# Patient Record
Sex: MALE | Race: WHITE | NOT HISPANIC OR LATINO | Employment: OTHER | URBAN - METROPOLITAN AREA
[De-identification: names, ages, dates, MRNs, and addresses within clinical notes are randomized per-mention and may not be internally consistent; named-entity substitution may affect disease eponyms.]

---

## 2018-05-28 ENCOUNTER — OFFICE VISIT (OUTPATIENT)
Dept: URGENT CARE | Facility: CLINIC | Age: 38
End: 2018-05-28
Payer: OTHER GOVERNMENT

## 2018-05-28 VITALS
HEART RATE: 72 BPM | TEMPERATURE: 98 F | SYSTOLIC BLOOD PRESSURE: 130 MMHG | OXYGEN SATURATION: 98 % | DIASTOLIC BLOOD PRESSURE: 80 MMHG | WEIGHT: 218.5 LBS

## 2018-05-28 DIAGNOSIS — K64.4 EXTERNAL HEMORRHOID: Primary | ICD-10-CM

## 2018-05-28 PROCEDURE — 99203 OFFICE O/P NEW LOW 30 MIN: CPT | Mod: PBBFAC,PO | Performed by: NURSE PRACTITIONER

## 2018-05-28 PROCEDURE — 99999 PR PBB SHADOW E&M-NEW PATIENT-LVL III: CPT | Mod: PBBFAC,,, | Performed by: NURSE PRACTITIONER

## 2018-05-28 PROCEDURE — 99214 OFFICE O/P EST MOD 30 MIN: CPT | Mod: S$PBB,,, | Performed by: NURSE PRACTITIONER

## 2018-05-28 NOTE — PATIENT INSTRUCTIONS
Hemorrhoids    Hemorrhoids are swollen and inflamed veins inside the rectum and near the anus. The rectum is the last several inches of the colon. The anus is the passage between the rectum and the outside of the body.  Causes  The veins can become swollen due to increased pressure in them. This is most often caused by:  · Chronic constipation or diarrhea  · Straining when having a bowel movement  · Sitting too long on the toilet  · A low-fiber diet  · Pregnancy  Symptoms  · Bleeding from the rectum (this may be noticeable after bowel movements)  · Lump near the anus  · Itching around the anus  · Pain around the anus  There are different types of hemorrhoids. Depending on the type you have and the severity, you may be able to treat yourself at home. In some cases, a procedure may be the best treatment option. Your healthcare provider can tell you more about this, if needed.  Home care  General care  · To get relief from pain or itching, try:  ¨ Topical products. Your healthcare provider may prescribe or recommend creams, ointments, or pads that can be applied to the hemorrhoid. Use these exactly as directed.  ¨ Medicines. Your healthcare provider may recommend stool softeners, suppositories, or laxatives to help manage constipation. Use these exactly as directed.  ¨ Sitz baths. A sitz bath involves sitting in a few inches of warm bath water. Be careful not to make the water so hot that you burn yourself--test it before sitting in it. Soak for about 10 to 15 minutes a few times a day. This may help relieve pain.  Tips to help prevent hemorrhoids  · Eat more fiber. Fiber adds bulk to stool and absorbs water as it moves through your colon. This makes stool softer and easier to pass.  ¨ Increase the fiber in your diet with more fiber-rich foods. These include fresh fruit, vegetables, and whole grains.  ¨ Take a fiber supplement or bulking agent, if advised to by your provider. These include products such as psyllium  or methylcellulose.  · Drink plenty of water, if directed to by your provider. This can help keep stool soft.  · Be more active. Frequent exercise aids digestion and helps prevent constipation. It may also help make bowel movements more regular.  · Dont strain during bowel movements. This can make hemorrhoids more likely. Also, dont sit on the toilet for long periods of time.  Follow-up care  Follow up with your healthcare provider, or as advised. If a culture or imaging tests were done, you will be notified of the results when they are ready. This may take a few days or longer.  When to seek medical advice  Call your healthcare provider right away if any of these occur:  · Increased bleeding from the rectum  · Increased pain around the rectum or anus  · Weakness or dizziness  Call 911  Call 911 or return to the emergency department right away if any of these occur:  · Trouble breathing or swallowing  · Fainting or loss of consciousness  · Unusually fast heart rate  · Vomiting blood  · Large amounts of blood in stool  Date Last Reviewed: 6/22/2015 © 2000-2017 [employer]. [address] [place] [address], [place] [address]. All rights reserved. This information is not intended as a substitute for professional medical care. Always follow your healthcare professional's instructions.

## 2018-05-28 NOTE — PROGRESS NOTES
Subjective:       Patient ID: Blakemore Matthew is a 37 y.o. male.    Chief Complaint: Hemorrhoids    Pt is a 37 year old male to clinic today with complaints of rectal pain and what he believes is a hemorrhoid that began today. Pt states he has been doing heavy weight lifting in gym lately.       Review of Systems   Constitutional: Negative for chills, diaphoresis, fatigue and fever.   HENT: Negative for congestion, sinus pressure and sore throat.    Eyes: Negative for pain.   Respiratory: Negative for cough, chest tightness, shortness of breath and wheezing.    Cardiovascular: Negative for chest pain and palpitations.   Gastrointestinal: Positive for rectal pain (external ). Negative for abdominal pain, anal bleeding, blood in stool, constipation, diarrhea, nausea and vomiting.   Genitourinary: Negative for dysuria.   Musculoskeletal: Negative for back pain, myalgias and neck pain.   Skin: Negative for rash.   Neurological: Negative for dizziness, light-headedness and headaches.       Objective:      Physical Exam   Constitutional: He is oriented to person, place, and time. He appears well-developed and well-nourished. No distress.   HENT:   Head: Normocephalic.   Right Ear: External ear normal.   Left Ear: External ear normal.   Nose: Nose normal.   Eyes: Pupils are equal, round, and reactive to light.   Genitourinary: Rectal exam shows external hemorrhoid.         Neurological: He is alert and oriented to person, place, and time.   Skin: Skin is warm and dry. No rash noted. He is not diaphoretic.   Psychiatric: He has a normal mood and affect. His speech is normal and behavior is normal. Thought content normal.   Nursing note and vitals reviewed.      Assessment:       1. External hemorrhoid        Plan:   External hemorrhoid  -     hydrocortisone-pramoxine (PROCTOFOAM-HS) rectal foam; Place 1 applicator rectally 2 (two) times daily.  Dispense: 10 g; Refill: 0      Recommend pt f/u with GI if symptoms worsen or  fail to improve.    Follow prescribed treatment plan as directed.  Stay hydrated and rest.  Report to ER if symptoms worsen.  Follow up with PCP in 2-3 days or sooner if symptoms do not improve.

## 2018-06-12 ENCOUNTER — OFFICE VISIT (OUTPATIENT)
Dept: INTERNAL MEDICINE | Facility: CLINIC | Age: 38
End: 2018-06-12
Payer: OTHER GOVERNMENT

## 2018-06-12 ENCOUNTER — LAB VISIT (OUTPATIENT)
Dept: LAB | Facility: HOSPITAL | Age: 38
End: 2018-06-12
Attending: INTERNAL MEDICINE
Payer: OTHER GOVERNMENT

## 2018-06-12 VITALS
TEMPERATURE: 97 F | HEIGHT: 71 IN | SYSTOLIC BLOOD PRESSURE: 112 MMHG | WEIGHT: 217.63 LBS | DIASTOLIC BLOOD PRESSURE: 78 MMHG | BODY MASS INDEX: 30.47 KG/M2 | HEART RATE: 84 BPM

## 2018-06-12 DIAGNOSIS — Z00.00 ROUTINE GENERAL MEDICAL EXAMINATION AT A HEALTH CARE FACILITY: ICD-10-CM

## 2018-06-12 DIAGNOSIS — Z30.09 VASECTOMY EVALUATION: Primary | ICD-10-CM

## 2018-06-12 DIAGNOSIS — Z80.8 FAMILY HISTORY OF MELANOMA: ICD-10-CM

## 2018-06-12 LAB
ANION GAP SERPL CALC-SCNC: 8 MMOL/L
BASOPHILS # BLD AUTO: 0.05 K/UL
BASOPHILS NFR BLD: 1 %
BUN SERPL-MCNC: 16 MG/DL
CALCIUM SERPL-MCNC: 9.8 MG/DL
CHLORIDE SERPL-SCNC: 105 MMOL/L
CHOLEST SERPL-MCNC: 154 MG/DL
CHOLEST/HDLC SERPL: 5.5 {RATIO}
CO2 SERPL-SCNC: 24 MMOL/L
CREAT SERPL-MCNC: 0.9 MG/DL
DIFFERENTIAL METHOD: NORMAL
EOSINOPHIL # BLD AUTO: 0.1 K/UL
EOSINOPHIL NFR BLD: 1.6 %
ERYTHROCYTE [DISTWIDTH] IN BLOOD BY AUTOMATED COUNT: 12.7 %
EST. GFR  (AFRICAN AMERICAN): >60 ML/MIN/1.73 M^2
EST. GFR  (NON AFRICAN AMERICAN): >60 ML/MIN/1.73 M^2
GLUCOSE SERPL-MCNC: 86 MG/DL
HCT VFR BLD AUTO: 46.6 %
HDLC SERPL-MCNC: 28 MG/DL
HDLC SERPL: 18.2 %
HGB BLD-MCNC: 15.6 G/DL
IMM GRANULOCYTES # BLD AUTO: 0.01 K/UL
IMM GRANULOCYTES NFR BLD AUTO: 0.2 %
LDLC SERPL CALC-MCNC: 105.6 MG/DL
LYMPHOCYTES # BLD AUTO: 1.6 K/UL
LYMPHOCYTES NFR BLD: 33.2 %
MCH RBC QN AUTO: 28.6 PG
MCHC RBC AUTO-ENTMCNC: 33.5 G/DL
MCV RBC AUTO: 85 FL
MONOCYTES # BLD AUTO: 0.3 K/UL
MONOCYTES NFR BLD: 7 %
NEUTROPHILS # BLD AUTO: 2.8 K/UL
NEUTROPHILS NFR BLD: 57 %
NONHDLC SERPL-MCNC: 126 MG/DL
NRBC BLD-RTO: 0 /100 WBC
PLATELET # BLD AUTO: 269 K/UL
PMV BLD AUTO: 10.6 FL
POTASSIUM SERPL-SCNC: 4.1 MMOL/L
RBC # BLD AUTO: 5.46 M/UL
SODIUM SERPL-SCNC: 137 MMOL/L
TRIGL SERPL-MCNC: 102 MG/DL
WBC # BLD AUTO: 4.88 K/UL

## 2018-06-12 PROCEDURE — 99999 PR PBB SHADOW E&M-EST. PATIENT-LVL III: CPT | Mod: PBBFAC,,, | Performed by: PHYSICIAN ASSISTANT

## 2018-06-12 PROCEDURE — 36415 COLL VENOUS BLD VENIPUNCTURE: CPT | Mod: PO

## 2018-06-12 PROCEDURE — 85025 COMPLETE CBC W/AUTO DIFF WBC: CPT

## 2018-06-12 PROCEDURE — 80061 LIPID PANEL: CPT

## 2018-06-12 PROCEDURE — 80048 BASIC METABOLIC PNL TOTAL CA: CPT

## 2018-06-12 PROCEDURE — 99395 PREV VISIT EST AGE 18-39: CPT | Mod: S$PBB,,, | Performed by: PHYSICIAN ASSISTANT

## 2018-06-12 PROCEDURE — 99213 OFFICE O/P EST LOW 20 MIN: CPT | Mod: PBBFAC,PO | Performed by: PHYSICIAN ASSISTANT

## 2018-06-12 NOTE — PROGRESS NOTES
"Subjective:       Patient ID: Blakemore Matthew is a 37 y.o. male.    Chief Complaint: Sterilization    Patient comes in today for check up and referral for vasectomy   He is an overall healthy individual   Just has some chronic knee issues that are improving with wt loss, clean eating.     Wanting evaluation for vasectomy   Wife pregnant with 5th child, due in August.   Patient on no medication, has no pertinent medical history     Family history + early CVD in grandfather, and Patient states multiple family members have melanoma, patient has never been to Dermatology for check up         Health Maintenance Due   Topic Date Due    TETANUS VACCINE  08/17/1997       History reviewed. No pertinent past medical history.    No current outpatient prescriptions on file.     No current facility-administered medications for this visit.        Review of Systems   Constitutional: Negative for activity change, fatigue, fever and unexpected weight change.   HENT: Negative for facial swelling, trouble swallowing and voice change.    Eyes: Negative for visual disturbance.   Respiratory: Negative for cough, chest tightness and shortness of breath.    Cardiovascular: Negative for chest pain and leg swelling.   Gastrointestinal: Negative for abdominal distention, abdominal pain and blood in stool.   Endocrine: Negative for polydipsia and polyuria.   Genitourinary: Negative for difficulty urinating, flank pain and penile pain.   Musculoskeletal: Positive for arthralgias. Negative for back pain.   Skin: Negative for color change and rash.   Allergic/Immunologic: Negative.  Negative for immunocompromised state.   Neurological: Negative for dizziness, facial asymmetry and speech difficulty.   Hematological: Negative for adenopathy. Does not bruise/bleed easily.   Psychiatric/Behavioral: Negative for agitation and suicidal ideas.       Objective:   /78   Pulse 84   Temp 96.9 °F (36.1 °C) (Tympanic)   Ht 5' 11" (1.803 m)   Wt " 98.7 kg (217 lb 9.5 oz)   BMI 30.35 kg/m²      Physical Exam   Constitutional: He is oriented to person, place, and time. He appears well-developed and well-nourished. No distress.   HENT:   Head: Normocephalic and atraumatic.   Right Ear: Hearing, tympanic membrane, external ear and ear canal normal.   Left Ear: Hearing, tympanic membrane, external ear and ear canal normal.   Nose: Nose normal.   Mouth/Throat: Oropharynx is clear and moist.   Eyes: Conjunctivae and EOM are normal. Pupils are equal, round, and reactive to light.   Neck: Normal range of motion. No thyromegaly present.   Cardiovascular: Normal rate, regular rhythm, normal heart sounds and intact distal pulses.    Pulmonary/Chest: Effort normal and breath sounds normal.   Lymphadenopathy:     He has no cervical adenopathy.   Neurological: He is alert and oriented to person, place, and time.         Lab Results   Component Value Date    WBC 4.88 06/12/2018    HGB 15.6 06/12/2018    HCT 46.6 06/12/2018     06/12/2018    CHOL 154 06/12/2018    TRIG 102 06/12/2018    HDL 28 (L) 06/12/2018     06/12/2018    K 4.1 06/12/2018     06/12/2018    CREATININE 0.9 06/12/2018    BUN 16 06/12/2018    CO2 24 06/12/2018       Assessment:       1. Vasectomy evaluation    2. Routine general medical examination at a health care facility    3. Family history of melanoma        Plan:   Vasectomy evaluation  -     Ambulatory referral to Urology    Routine general medical examination at a health care facility  -     Basic metabolic panel; Future; Expected date: 06/12/2018  -     CBC auto differential; Future; Expected date: 06/12/2018  -     Lipid panel; Future; Expected date: 06/12/2018  preventive measures discussed for age group   Family history of melanoma  -     Ambulatory referral to Dermatology        No Follow-up on file.

## 2018-06-12 NOTE — PATIENT INSTRUCTIONS
Prevention Guidelines, Men Ages 18 to 39  Screening tests and vaccines are an important part of managing your health. Health counseling is essential, too. Below are guidelines for these, for men ages 18 to 39. Talk with your healthcare provider to make sure youre up-to-date on what you need.  Screening Who needs it How often   Alcohol misuse All men in this age group At routine exams   Blood pressure All men in this age group Every 2 years if your blood pressure is less than 120/80 mm Hg; yearly if your systolic blood pressure is 120 to 139 mm Hg, or your diastolic blood pressure reading is 80 to 89 mm Hg   Depression All men in this age group At routine exams   Diabetes mellitus, type 2 Adults who have no symptoms but are overweight or obese and have 1 or more other risk factors for diabetes At least every 3 years (yearly if blood sugar has already started to rise)   Hepatitis C If at increased risk At routine exams   High cholesterol or triglycerides All men ages 35 and older, and younger men at high risk for coronary artery disease At least every 5 years   HIV All men At routine exams   Obesity All men in this age group At routine exams   Syphilis Men at increased risk for infection - talk with your healthcare provider At routine exams   Tuberculosis Men at increased risk for infection - talk with your healthcare provider Check with your healthcare provider   Vision All men in this age group Every 5 to 10 years if no risk factors for eye disease   Vaccines Who needs it How often   Chickenpox (varicella) All men in this age group who have no record of this infection or vaccine 2 doses; the second dose should be given at least 4 weeks after the first dose   Hepatitis A Men at increased risk for infection - talk with your healthcare provider 2 doses given at least 6 months apart   Hepatitis B Men at increased risk for infection - talk with your healthcare provider 3 doses over 6 months; second dose should be  given 1 month after the first dose; the third dose should be given at least 2 months after the second dose and at least 4 months after the first dose   Haemophilus influenzae Type B (HIB) Men at increased risk for infection - talk with your healthcare provider 1 to 3 doses   Human papillomavirus (HPV) All men in this age group up to age 26 3 doses; the second dose should be given 1 to 2 months after the first dose and the third dose given 6 months after the first dose   Influenza (flu) All men in this age group Once a year   Measles, mumps, rubella (MMR) All men in this age group who have no record of these infections or vaccines 1 or 2 doses through age 55   Meningococcal Men at increased risk for infection - talk with your healthcare provider 1 or more doses   Pneumococca (PCV13) and Pneumococcal (PPSV23) Men at increased risk for infection - talk with your healthcare provider PCV13: 1 dose ages 19 to 65 (protects against 13 types of pneumococcal bacteria)  PPSV23: 1 to 2 doses through age 64, or 1 dose at 65 or older (protects against 23 types of pneumococcal bacteria)   Tetanus/diphtheria/pertussis (Td/Tdap) booster All men in this age group A one-time Tdap booster after age 18, then Td every10 years   Counseling Who needs it How often   Diet and exercise Overweight or obese people When diagnosed, and then at routine exams   Use of tobacco and the health effects it can cause All men in this age group Every visit   Sexually transmitted infection prevention Men who are sexually active At routine exams   Skin cancer Prevention of skin cancer in fair-skinned adults through age 24 At routine exams   1Those who are 18 years of age, who are not up-to-date on their childhood immunizations, should receive all appropriate catch-up vaccines recommended by the CDC.  Date Last Reviewed: 2/1/2017  © 7515-8948 [employer]. [address] [place] [address], [place] [address]. All rights reserved. This information is not  intended as a substitute for professional medical care. Always follow your healthcare professional's instructions.

## 2018-06-13 ENCOUNTER — PATIENT MESSAGE (OUTPATIENT)
Dept: INTERNAL MEDICINE | Facility: CLINIC | Age: 38
End: 2018-06-13

## 2018-07-24 ENCOUNTER — OFFICE VISIT (OUTPATIENT)
Dept: URGENT CARE | Facility: CLINIC | Age: 38
End: 2018-07-24
Payer: OTHER GOVERNMENT

## 2018-07-24 VITALS
RESPIRATION RATE: 18 BRPM | OXYGEN SATURATION: 98 % | HEART RATE: 63 BPM | WEIGHT: 204.56 LBS | HEIGHT: 72 IN | SYSTOLIC BLOOD PRESSURE: 116 MMHG | TEMPERATURE: 97 F | BODY MASS INDEX: 27.71 KG/M2 | DIASTOLIC BLOOD PRESSURE: 64 MMHG

## 2018-07-24 DIAGNOSIS — H57.89 IRRITATION OF LEFT EYE: ICD-10-CM

## 2018-07-24 DIAGNOSIS — H10.9 CONJUNCTIVITIS OF LEFT EYE, UNSPECIFIED CONJUNCTIVITIS TYPE: Primary | ICD-10-CM

## 2018-07-24 PROCEDURE — 99214 OFFICE O/P EST MOD 30 MIN: CPT | Mod: S$PBB,,, | Performed by: NURSE PRACTITIONER

## 2018-07-24 PROCEDURE — 99213 OFFICE O/P EST LOW 20 MIN: CPT | Mod: PBBFAC,PO | Performed by: NURSE PRACTITIONER

## 2018-07-24 PROCEDURE — 99999 PR PBB SHADOW E&M-EST. PATIENT-LVL III: CPT | Mod: PBBFAC,,, | Performed by: NURSE PRACTITIONER

## 2018-07-24 RX ORDER — OFLOXACIN 3 MG/ML
1 SOLUTION/ DROPS OPHTHALMIC 4 TIMES DAILY
Qty: 5 ML | Refills: 0 | Status: SHIPPED | OUTPATIENT
Start: 2018-07-24 | End: 2018-09-24

## 2018-07-24 NOTE — PATIENT INSTRUCTIONS
Conjunctivitis, Bacterial    You have an infection in the membranes covering the white part of the eye. This part of the eye is called the conjunctiva. The infection is called conjunctivitis. The most common symptoms of conjunctivitis include a thick, pus-like discharge from the eye, swollen eyelids, redness, eyelids sticking together upon awakening, and a gritty or scratchy feeling in the eye. Your infection was caused by bacteria. It may be treated with medicine. With treatment, the infection takes about 7 to 10 days to resolve.  Home care  · Use prescribed antibiotic eye drops or ointment as directed to treat the infection.  · Apply a warm compress (towel soaked in warm water) to the affected eye 3 to 4 times a day. Do this just before applying medicine to the eye.  · Use a warm, wet cloth to wipe away crusting of the eyelids in the morning. This is caused by mucus drainage during the night. You may also use saline irrigating solution or artificial tears to rinse away mucus in the eye. Do not put a patch over the eye.  · Wash your hands before and after touching the infected eye. This is to prevent spreading the infection to the other eye, and to other people. Do not share your towels or washcloths with others.  · You may use acetaminophen or ibuprofen to control pain, unless another medicine was prescribed. (Note: If you have chronic liver or kidney disease or have ever had a stomach ulcer or gastrointestinal bleeding, talk with your doctor before using these medicines.)  · Do not wear contact lenses until your eyes have healed and all symptoms are gone.  Follow-up care  Follow up with your healthcare provider, or as advised.  When to seek medical advice  Call your healthcare provider right away if any of these occur:  · Worsening vision  · Increasing pain in the eye  · Increasing swelling or redness of the eyelid  · Redness spreading around the eye  Date Last Reviewed: 6/14/2015  © 4138-8953 [employer], [employer]. [address] [place] [address], [place] [address]. All rights reserved. This information is not intended as a substitute for professional medical care. Always follow your healthcare professional's instructions.

## 2018-07-24 NOTE — PROGRESS NOTES
Subjective:       Patient ID: Blakemore Matthew is a 37 y.o. male.    Chief Complaint: Eye Problem    Pt is a 37 year old male to clinic today with complaints of left eye redness, irritation, and crusting that began 2 days ago.       Eye Problem    The left eye is affected. This is a new problem. The current episode started in the past 7 days. The problem occurs constantly. The problem has been unchanged. There was no injury mechanism. The patient is experiencing no pain. There is no known exposure to pink eye. He does not wear contacts. Associated symptoms include an eye discharge, eye redness and itching. Pertinent negatives include no blurred vision, double vision, fever, foreign body sensation, nausea, photophobia, recent URI or vomiting. He has tried nothing for the symptoms.     Review of Systems   Constitutional: Negative for chills, diaphoresis, fatigue and fever.   HENT: Negative for congestion, sinus pressure and sore throat.    Eyes: Positive for discharge, redness and itching. Negative for blurred vision, double vision, photophobia, pain and visual disturbance.   Respiratory: Negative for cough, chest tightness, shortness of breath and wheezing.    Cardiovascular: Negative for chest pain and palpitations.   Gastrointestinal: Negative for abdominal pain, diarrhea, nausea and vomiting.   Musculoskeletal: Negative for back pain, myalgias and neck pain.   Skin: Negative for rash.   Neurological: Negative for dizziness, light-headedness and headaches.       Objective:      Physical Exam   Constitutional: He is oriented to person, place, and time. He appears well-developed and well-nourished. No distress.   HENT:   Head: Normocephalic.   Right Ear: External ear normal.   Left Ear: External ear normal.   Nose: Nose normal.   Eyes: EOM are normal. Pupils are equal, round, and reactive to light. Right eye exhibits no discharge. Left eye exhibits discharge. Left conjunctiva is injected. Left conjunctiva has no  hemorrhage. No scleral icterus.   Neurological: He is alert and oriented to person, place, and time.   Skin: Skin is warm and dry. No rash noted. He is not diaphoretic.   Psychiatric: He has a normal mood and affect. His speech is normal and behavior is normal. Thought content normal.   Nursing note and vitals reviewed.      Assessment:       1. Conjunctivitis of left eye, unspecified conjunctivitis type    2. Irritation of left eye        Plan:   Conjunctivitis of left eye, unspecified conjunctivitis type  -     ofloxacin (OCUFLOX) 0.3 % ophthalmic solution; Place 1 drop into the left eye 4 (four) times daily.  Dispense: 5 mL; Refill: 0    Irritation of left eye  -     Visual acuity screening      · May use Bc's baby shampoo to clean crust/drainage from eyes.  · Do not wear eye make up. Discard any makeup that may have come into contact with eyes just prior to onset of symptoms.  · Do not wear contact lenses and discard contacts used just prior to/during symptoms.    · Stop eye drops if eyes hurt/burn or worsen with treatment and call or return to clinic.   · Wash all of your towels, pillow cases, and sheets in hot water.  · Wash your hands or use hand  frequently and especially before touching anyone to prevent spread of infection.  · If symptoms not improving in 2 days or if symptoms worsen at any point, please follow up with ophthalmology or your primary care provider.   · Go to the ER for any vision changes (especially loss of vision), severe headache or eye pain, vomiting, or any other new and concerning symptoms.

## 2018-09-04 ENCOUNTER — TELEPHONE (OUTPATIENT)
Dept: DERMATOLOGY | Facility: CLINIC | Age: 38
End: 2018-09-04

## 2018-09-05 ENCOUNTER — OFFICE VISIT (OUTPATIENT)
Dept: UROLOGY | Facility: CLINIC | Age: 38
End: 2018-09-05
Payer: OTHER GOVERNMENT

## 2018-09-05 VITALS
DIASTOLIC BLOOD PRESSURE: 78 MMHG | WEIGHT: 190.81 LBS | SYSTOLIC BLOOD PRESSURE: 122 MMHG | BODY MASS INDEX: 25.84 KG/M2 | HEART RATE: 58 BPM | HEIGHT: 72 IN | RESPIRATION RATE: 18 BRPM

## 2018-09-05 DIAGNOSIS — Z30.09 UNWANTED FERTILITY: Primary | ICD-10-CM

## 2018-09-05 PROCEDURE — 99999 PR PBB SHADOW E&M-EST. PATIENT-LVL III: CPT | Mod: PBBFAC,,, | Performed by: UROLOGY

## 2018-09-05 PROCEDURE — 99244 OFF/OP CNSLTJ NEW/EST MOD 40: CPT | Mod: S$PBB,,, | Performed by: UROLOGY

## 2018-09-05 PROCEDURE — 99213 OFFICE O/P EST LOW 20 MIN: CPT | Mod: PBBFAC | Performed by: UROLOGY

## 2018-09-05 RX ORDER — DIAZEPAM 10 MG/1
10 TABLET ORAL
Qty: 1 TABLET | Refills: 0 | Status: SHIPPED | OUTPATIENT
Start: 2018-09-05 | End: 2018-09-24

## 2018-09-05 NOTE — PROGRESS NOTES
Chief Complaint: Unwanted Fertility    HPI:   9/5/18: 39 yo man with 5 children desires no more; referred by Dr. Perez.  No abd/pelvic pain and no exac/rel factors.  No hematuria.  No urolithiasis.  No urinary bother.  No  history.  Normal sexual function.    Allergies:  Penicillins    Medications:  has a current medication list which includes the following prescription(s): ofloxacin.    Review of Systems:  General: No fever, chills, fatigability, or weight loss.  Skin: No rashes, itching, or changes in color or texture of skin.  Chest: Denies SAVAGE, cyanosis, wheezing, cough, and sputum production.  Abdomen: Appetite fine. No weight loss. Denies diarrhea, abdominal pain, hematemesis, or blood in stool.  Musculoskeletal: No joint stiffness or swelling. Denies back pain.  : As above.  All other review of systems negative.    PMH:   has no past medical history on file.    PSH:   has no past surgical history on file.    FamHx: family history includes Heart attacks under age 50 in his maternal grandfather.    SocHx:  reports that he has quit smoking. he has never used smokeless tobacco. He reports that he does not drink alcohol or use drugs.      Physical Exam:  Vitals:    09/05/18 0806   BP: 122/78   Pulse: (!) 58   Resp: 18     General: A&Ox3, no apparent distress, no deformities  Neck: No masses, normal thyroid  Lungs: normal inspiration, no use of accessory muscles  Heart: normal pulse, no arrhythmias  Abdomen: Soft, NT, ND, no masses, no hernias, no hepatosplenomegaly  Lymphatic: Neck and groin nodes negative  Skin: The skin is warm and dry. No jaundice.  Ext: No c/c/e.  : Test desc adela, no abnormalities of epididymus. Penis normal, with normal penile and scrotal skin. Meatus normal.     Labs/Studies: Urinalysis performed in clinic, summary: UA normal    Impression/Plan:   1. Risks/benefits of vasectomy reviewed.  Will schedule.  Valium rx.

## 2018-09-05 NOTE — LETTER
September 5, 2018      SARAY Garza  [address] [facility] [place]  [facility] [address]           [facility]  [address] [place] [address]  Phone: [phone]  Fax: [phone]          Patient: Blakemore Matthew   MR Number: [id number]   YOB: 1980   Date of Visit: 9/5/2018       Dear Darleen Perez:    Thank you for referring Blakemore Matthew to me for evaluation. Attached you will find relevant portions of my assessment and plan of care.    If you have questions, please do not hesitate to call me. I look forward to following Blakemore Matthew along with you.    Sincerely,    Travis Flowers IV, MD    Enclosure  CC:  No Recipients    If you would like to receive this communication electronically, please contact [email or web address] or [phone] to request more information on [employer] Link access.    For providers and/or their staff who would like to refer a patient to [facility], please contact us through our one-stop-shop provider referral line, [facility] , at [phone].    If you feel you have received this communication in error or would no longer like to receive these types of communications, please e-mail [email or web address]

## 2018-09-17 ENCOUNTER — INITIAL CONSULT (OUTPATIENT)
Dept: DERMATOLOGY | Facility: CLINIC | Age: 38
End: 2018-09-17
Payer: OTHER GOVERNMENT

## 2018-09-17 DIAGNOSIS — D48.5 NEOPLASM OF UNCERTAIN BEHAVIOR OF SKIN: ICD-10-CM

## 2018-09-17 DIAGNOSIS — D22.9 MULTIPLE NEVI: Primary | ICD-10-CM

## 2018-09-17 DIAGNOSIS — L81.4 LENTIGINES: ICD-10-CM

## 2018-09-17 DIAGNOSIS — B35.3 TINEA PEDIS OF BOTH FEET: ICD-10-CM

## 2018-09-17 PROCEDURE — 88305 TISSUE EXAM BY PATHOLOGIST: CPT | Mod: 26,,, | Performed by: PATHOLOGY

## 2018-09-17 PROCEDURE — 99999 PR PBB SHADOW E&M-EST. PATIENT-LVL III: CPT | Mod: PBBFAC,,, | Performed by: DERMATOLOGY

## 2018-09-17 PROCEDURE — 99203 OFFICE O/P NEW LOW 30 MIN: CPT | Mod: 25,S$PBB,, | Performed by: DERMATOLOGY

## 2018-09-17 PROCEDURE — 88305 TISSUE EXAM BY PATHOLOGIST: CPT | Performed by: PATHOLOGY

## 2018-09-17 PROCEDURE — 99213 OFFICE O/P EST LOW 20 MIN: CPT | Mod: PBBFAC,PO | Performed by: DERMATOLOGY

## 2018-09-17 PROCEDURE — 11100 PR BIOPSY OF SKIN LESION: CPT | Mod: S$PBB,,, | Performed by: DERMATOLOGY

## 2018-09-17 PROCEDURE — 11100 PR BIOPSY OF SKIN LESION: CPT | Mod: PBBFAC,PO | Performed by: DERMATOLOGY

## 2018-09-17 NOTE — LETTER
September 17, 2018      SARAY Garza  [address] [facility] [place] [address]           [facility]  [address] [facility] [address] [place] [address]  Phone: [phone]  Fax: [phone]          Patient: Matthew Blakemore   MR Number: [id number]   YOB: 1980   Date of Visit: 9/17/2018       Dear Darleen Perez:    Thank you for referring Matthew Blakemore to me for evaluation. Attached you will find relevant portions of my assessment and plan of care.    If you have questions, please do not hesitate to call me. I look forward to following Matthew Blakemore along with you.    Sincerely,    Flaquita Trinh MD    Enclosure  CC:  No Recipients    If you would like to receive this communication electronically, please contact [email or web address] or [phone] to request more information on [employer] Link access.    For providers and/or their staff who would like to refer a patient to [facility], please contact us through our one-stop-shop provider referral line, [facility], at [phone].    If you feel you have received this communication in error or would no longer like to receive these types of communications, please e-mail [email or web address]

## 2018-09-17 NOTE — PROGRESS NOTES
Subjective:       Patient ID:  Matthew Blakemore is a 38 y.o. male who presents for   Chief Complaint   Patient presents with    Skin Check     TBSE,,family hx of melanoma     History of Present Illness: The patient presents with chief complaint of lesion.  Location: right forearm  Duration: 7 months  Signs/Symptoms: none    Prior treatments: none    Family history of melanoma in mother, father, brother and sister        Review of Systems   Constitutional: Negative for fever and chills.   Gastrointestinal: Negative for nausea and vomiting.   Skin: Negative for daily sunscreen use, activity-related sunscreen use and recent sunburn.   Hematologic/Lymphatic: Does not bruise/bleed easily.        Objective:    Physical Exam   Constitutional: He appears well-developed and well-nourished. No distress.   Neurological: He is alert and oriented to person, place, and time. He is not disoriented.   Psychiatric: He has a normal mood and affect.   Skin:   Areas Examined (abnormalities noted in diagram):   Scalp / Hair Palpated and Inspected  Head / Face Inspection Performed  Neck Inspection Performed  Chest / Axilla Inspection Performed  Abdomen Inspection Performed  Genitals / Buttocks / Groin Inspection Performed  Back Inspection Performed  RUE Inspected  LUE Inspection Performed  RLE Inspected  LLE Inspection Performed  Nails and Digits Inspection Performed                  Diagram Legend     Erythematous scaling macule/papule c/w actinic keratosis       Vascular papule c/w angioma      Pigmented verrucoid papule/plaque c/w seborrheic keratosis      Yellow umbilicated papule c/w sebaceous hyperplasia      Irregularly shaped tan macule c/w lentigo     1-2 mm smooth white papules consistent with Milia      Movable subcutaneous cyst with punctum c/w epidermal inclusion cyst      Subcutaneous movable cyst c/w pilar cyst      Firm pink to brown papule c/w dermatofibroma      Pedunculated fleshy papule(s) c/w skin tag(s)       Evenly pigmented macule c/w junctional nevus     Mildly variegated pigmented, slightly irregular-bordered macule c/w mildly atypical nevus      Flesh colored to evenly pigmented papule c/w intradermal nevus       Pink pearly papule/plaque c/w basal cell carcinoma      Erythematous hyperkeratotic cursted plaque c/w SCC      Surgical scar with no sign of skin cancer recurrence      Open and closed comedones      Inflammatory papules and pustules      Verrucoid papule consistent consistent with wart     Erythematous eczematous patches and plaques     Dystrophic onycholytic nail with subungual debris c/w onychomycosis     Umbilicated papule    Erythematous-base heme-crusted tan verrucoid plaque consistent with inflamed seborrheic keratosis     Erythematous Silvery Scaling Plaque c/w Psoriasis     See annotation            Assessment / Plan:      Pathology Orders:     Normal Orders This Visit    Tissue Specimen To Pathology, Dermatology     Questions:    Directional Terms:  Other(comment)    Clinical information:  nevus r/o atypia    Specific Site:  left upper back        Multiple nevi  Lentigines  Reassurance given.  Discussed ABCDEF of melanoma and changes for patient to look for.  AAD Handout given. Discussed importance of daily use of sunscreen which is broad-spectrum and has a minimum SPF of 30.    Tinea pedis  Mild. C/ component of soft callus of lateral toe webs.  Recommend pt start using toe spacers or cotton ball/lanolin between toes and will give naftin gel.     Neoplasm of uncertain behavior of skin  -     Tissue Specimen To Pathology, Dermatology  -     Shave biopsy(-ies) done of 1 site(s).   Patient informed to call for results within 2 weeks if have not received notification via telephone call or MyChart               No Follow-up on file.     PROCEDURE NOTE - SHAVE BIOPSY   Location: see above    After risk, benefits, and alternatives were discussed with the patient, the patient agrees to the procedure by  verbal informed consent.  The area(s) were cleansed with alcohol. 2 cc of lidocaine 1% with epinephrine was injected for local anesthesia into each lesion(s).  A sharp dermablade was used to remove part or all of the lesion(s).  The specimen(s) will be sent for tissue pathology.  Hemostasis was obtained with aluminum chloride and/or hyfrecation.  The area(s) were dressed with vaseline ointment and bandaged.  The patient tolerated the procedure well without adverse events.  Wound care instructions were given to the patient on the AVS.  The patient will be notified of pathology results once available. Results will also be available in Epic.

## 2018-09-17 NOTE — PATIENT INSTRUCTIONS
CRYOSURGERY      Your doctor has used a method called cryosurgery to treat your skin condition. Cryosurgery refers to the use of very cold substances to treat a variety of skin conditions such as warts, pre-skin cancers, molluscum contagiosum, sun spots, and several benign growths. The substance we use in cryosurgery is liquid nitrogen and is so cold (-195 degrees Celsius) that is burns when administered.     Following treatment in the office, the skin may immediately burn and become red. You may find the area around the lesion is affected as well. It is sometimes necessary to treat not only the lesion, but a small area of the surrounding normal skin to achieve a good response.     A blister, and even a blood filled blister, may form after treatment.   This is a normal response. If the blister is painful, it is acceptable to sterilize a needle and with rubbing alcohol and gently pop the blister. It is important that you gently wash the area with soap and warm water as the blister fluid may contain wart virus if a wart was treated. Do no remove the roof of the blister.     The area treated can take anywhere from 1-3 weeks to heal. Healing time depends on the kind of skin lesion treated, the location, and how aggressively the lesion was treated. It is recommended that the areas treated are covered with Vaseline or bacitracin ointment and a band-aid. If a band-aid is not practical, just ointment applied several times per day will do. Keeping these areas moist will speed the healing time.    Treatment with liquid nitrogen can leave a scar. In dark skin, it may be a light or dark scar, in light skin it may be a white or pink scar. These will generally fade with time.    If you have any concerns after your treatment, please feel free to call the office.         [facility] DERMATOLOGY  [address]  [place] [address]  Dept: [phone]  Dept Fax: [phone]   Shave Biopsy Wound Care    Your  doctor has performed a shave biopsy today.  A band aid and vaseline ointment has been placed over the site.  This should remain in place for 24 hours.  It is recommended that you keep the area dry for the first 24 hours.  After 24 hours, you may remove the band aid and wash the area with warm soap and water and apply Vaseline jelly.  Many patients prefer to use Neosporin or Bacitracin ointment.  This is acceptable; however, know that you can develop an allergy to this medication even if you have used it safely for years.  It is important to keep the area moist.  Letting it dry out and get air slows healing time, and will worsen the scar.  Band aid is optional after first 24 hours.      If you notice increasing redness, tenderness, pain, or yellow drainage at the biopsy site, please notify your doctor.  These are signs of an infection.    If your biopsy site is bleeding, apply firm pressure for 15 minutes straight.  Repeat for another 15 minutes, if it is still bleeding.   If the surgical site continues to bleed, then please contact your doctor.      [facility] - [facility]   DERMATOLOGY  [address]   [place] [facility] [place] [facility] [address]   Dept: [phone]   Dept Fax: [phone]

## 2018-09-19 ENCOUNTER — PATIENT MESSAGE (OUTPATIENT)
Dept: INTERNAL MEDICINE | Facility: CLINIC | Age: 38
End: 2018-09-19

## 2018-09-19 DIAGNOSIS — H53.8 BLURRY VISION: Primary | ICD-10-CM

## 2018-09-24 ENCOUNTER — OFFICE VISIT (OUTPATIENT)
Dept: OPHTHALMOLOGY | Facility: CLINIC | Age: 38
End: 2018-09-24
Payer: OTHER GOVERNMENT

## 2018-09-24 ENCOUNTER — TELEPHONE (OUTPATIENT)
Dept: INTERNAL MEDICINE | Facility: CLINIC | Age: 38
End: 2018-09-24

## 2018-09-24 DIAGNOSIS — H54.7 DECREASED VISION: ICD-10-CM

## 2018-09-24 DIAGNOSIS — Z98.890 S/P LASIK SURGERY: ICD-10-CM

## 2018-09-24 DIAGNOSIS — H16.101 SUPERFICIAL KERATITIS OF RIGHT EYE: Primary | ICD-10-CM

## 2018-09-24 DIAGNOSIS — H52.203 ASTIGMATISM OF BOTH EYES, UNSPECIFIED TYPE: ICD-10-CM

## 2018-09-24 PROCEDURE — 92014 COMPRE OPH EXAM EST PT 1/>: CPT | Mod: S$PBB,,, | Performed by: OPTOMETRIST

## 2018-09-24 PROCEDURE — 99999 PR PBB SHADOW E&M-EST. PATIENT-LVL II: CPT | Mod: PBBFAC,,, | Performed by: OPTOMETRIST

## 2018-09-24 PROCEDURE — 92015 DETERMINE REFRACTIVE STATE: CPT | Mod: ,,, | Performed by: OPTOMETRIST

## 2018-09-24 PROCEDURE — 92134 CPTRZ OPH DX IMG PST SGM RTA: CPT | Mod: PBBFAC,PO | Performed by: OPTOMETRIST

## 2018-09-24 PROCEDURE — 99212 OFFICE O/P EST SF 10 MIN: CPT | Mod: PBBFAC,PO | Performed by: OPTOMETRIST

## 2018-09-24 RX ORDER — FLUOROMETHOLONE 1 MG/ML
1 SUSPENSION/ DROPS OPHTHALMIC 4 TIMES DAILY
Qty: 5 ML | Refills: 0 | Status: SHIPPED | OUTPATIENT
Start: 2018-09-24 | End: 2018-10-04

## 2018-09-24 NOTE — LETTER
September 27, 2018      David Corea MD  [id number] [facility]  [address]  [place] [address]           [facility] - Ophthalmology  [address]  [place] [address]  Phone: [phone]  Fax: [phone]          Patient: Matthew Blakemore   MR Number: [id number]   YOB: 1980   Date of Visit: 9/24/2018       Dear Dr. David Corea:    Thank you for referring Matthew Blakemore to me for evaluation. Attached you will find relevant portions of my assessment and plan of care.    If you have questions, please do not hesitate to call me. I look forward to following Matthew Blakemore along with you.    Sincerely,    Chinyere Light, OD    Enclosure  CC:  No Recipients    If you would like to receive this communication electronically, please contact [email or web address] or [phone] to request more information on [facility] Link access.    For providers and/or their staff who would like to refer a patient to [facility], please contact us through our one-stop-shop provider referral line, [facility] , at [phone].    If you feel you have received this communication in error or would no longer like to receive these types of communications, please e-mail [email or web address]

## 2018-09-24 NOTE — TELEPHONE ENCOUNTER
Informed pt as of 10 minutes ago his optometry referral was still pending and we would have to reschedule his appt. Per pt he would like to keep appt because he can not see out of his right eye so he would pay now and deal with insurance later.

## 2018-09-27 ENCOUNTER — OFFICE VISIT (OUTPATIENT)
Dept: OPHTHALMOLOGY | Facility: CLINIC | Age: 38
End: 2018-09-27
Payer: OTHER GOVERNMENT

## 2018-09-27 DIAGNOSIS — H52.203 ASTIGMATISM OF BOTH EYES, UNSPECIFIED TYPE: ICD-10-CM

## 2018-09-27 DIAGNOSIS — Z98.890 S/P LASIK SURGERY: Primary | ICD-10-CM

## 2018-09-27 DIAGNOSIS — H54.7 DECREASED VISION: ICD-10-CM

## 2018-09-27 PROCEDURE — 99212 OFFICE O/P EST SF 10 MIN: CPT | Mod: PBBFAC,PO | Performed by: OPTOMETRIST

## 2018-09-27 PROCEDURE — 92012 INTRM OPH EXAM EST PATIENT: CPT | Mod: S$PBB,,, | Performed by: OPTOMETRIST

## 2018-09-27 PROCEDURE — 99999 PR PBB SHADOW E&M-EST. PATIENT-LVL II: CPT | Mod: PBBFAC,,, | Performed by: OPTOMETRIST

## 2018-09-27 PROCEDURE — 92083 EXTENDED VISUAL FIELD XM: CPT | Mod: PBBFAC,PO | Performed by: OPTOMETRIST

## 2018-09-27 NOTE — PROGRESS NOTES
HPI     Eye Exam      Additional comments: Yearly              Comments     NP to [facility]  Last eye exam was about 9 years ago  Has noticeable changes in vision since last eye exam  No other complaints  No drops  1. 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  Patient had conjunctivitis in left eye which began on 7/22.  He used   Ocuflox and symptoms cleared.  Symptoms recurred, although not as severe,   and he used the drops again.  Has noticed blurred vision since the   infection.  Redness has persisted although there is no longer any   discharge.            Last edited by Chinyere Light, OD on 9/24/2018 11:45 AM. (History)            Assessment /Plan     For exam results, see Encounter Report.    Superficial keratitis of right eye  -     fluorometholone 0.1% (FML) 0.1 % DrpS; Place 1 drop into the left eye 4 (four) times daily. for 10 days  Dispense: 5 mL; Refill: 0    S/P LASIK surgery    Decreased vision  -     Cancel: Posterior Segment OCT Retina-Both eyes; Future  -     Posterior Segment OCT Retina-Both eyes  -     Cancel: Corneal Topography - OU - Both Eyes; Future  -     Corneal Topography - OU - Both Eyes    Other orders  -     Cancel: Posterior Segment OCT Optic Nerve- Both eyes; Future      Decreased vision OS  Best corrected VA 20/40 left eye.  Macula is normal,  no significant corneal distortion apparent on topography.   Epithelium appears a little hazy left eye.  FML four times a day left eye to clear inflammation post viral conjunctivitis and will rule out residual corneal edema as the cause of the blurred vision.  Return to clinic for cornea recheck and visual field.

## 2018-09-28 NOTE — PROGRESS NOTES
HPI     Follow-up      Additional comments: HVF              Comments     Last seen by [facility] on 9/24/18 for yearly eye exam  Patient here today for F  Patient states noticeable changes since last visit.  *Blurred vision   fluctuates.  Changes are not associated with any particular visual task.    Right occasionally blurs.    No pain or discomfort  Patient is using FML QID OS  1. Blurred vision left eye.            Last edited by Chinyere Light, OD on 9/27/2018  9:22 PM. (History)            Assessment /Plan     For exam results, see Encounter Report.    S/P LASIK surgery    Decreased vision  -     Dobbs Visual Field - OU - Extended - Both Eyes    Astigmatism of both eyes, unspecified type      Normal visual field both eyes.  Corneas are clear both eyes.  Pinhole identifies blur as refractive, most likely a complication of LASIK.  No correction indicated at this time.  A rigid contact lens could be tried in the future if the blur interferes with activities of daily living  Return to clinic 1 yr.

## 2018-10-17 ENCOUNTER — TELEPHONE (OUTPATIENT)
Dept: UROLOGY | Facility: CLINIC | Age: 38
End: 2018-10-17

## 2018-10-17 NOTE — TELEPHONE ENCOUNTER
Patient states he will need to postpone vasectomy with Dr. Flowers tomorrow in the OR because his child was just admitted to [facility] in . Dr. Flowers and surgery department notified. Patient has been removed from surgery schedule and will call back at a later date to reschedule.

## 2018-11-20 ENCOUNTER — TELEPHONE (OUTPATIENT)
Dept: UROLOGY | Facility: CLINIC | Age: 38
End: 2018-11-20

## 2018-11-20 NOTE — TELEPHONE ENCOUNTER
----- Message from Cecilia Ngo sent at 11/20/2018 12:31 PM CST -----  Contact: pt  States he needs to schedule his vasectomy. Please call pt at [phone]. Thank you

## 2018-12-24 ENCOUNTER — TELEPHONE (OUTPATIENT)
Dept: UROLOGY | Facility: CLINIC | Age: 38
End: 2018-12-24

## 2018-12-24 NOTE — TELEPHONE ENCOUNTER
Patient wishing to rescheduled his OR vasectomy after January 13. What date would you like to schedule?

## 2018-12-28 DIAGNOSIS — Z30.09 UNWANTED FERTILITY: Primary | ICD-10-CM

## 2019-01-24 NOTE — PRE ADMISSION SCREENING
Pre op instructions reviewed with patient per phone:    To confirm, Your surgeon has instructed you:  Surgery is scheduled 01/31/19at per MD.      Please report to [facility] 1st floor main lobby by per MD.         INSTRUCTIONS IMPORTANT!!!  ¨ Do not eat, drink, or smoke after 12 midnight-including water. OK to brush teeth, no gum, candy or mints!    ¨ Take only these medicines with a small swallow of water-morning of surgery.  N/A        ____  Do not wear makeup, including mascara.  ____  No powder, lotions or creams to surgical area.  ____  Please remove all jewelry, including piercings and leave at home.  ____  No money or valuables needed. Please leave at home.  ____  Please bring identification and insurance information to hospital.  ____  If going home the same day, arrange for a ride home. You will not be able to   drive if Anesthesia was used.  ____  Children, under 12 years old, must remain in the waiting room with an adult.  They are not allowed in patient areas.  ____  Wear loose fitting clothing. Allow for dressings, bandages.  ____  Stop Aspirin, Ibuprofen, Motrin and Aleve at least 5-7 days before surgery, unless otherwise instructed by your doctor, or the nurse.   You MAY use Tylenol/acetaminophen until day of surgery.  ____  If you take diabetic medication, do not take am of surgery unless instructed by   Doctor.  ____ Stop taking any Fish Oil supplement or any Vitamins that contain Vitamin E at least 5 days prior to surgery.          Bathing Instructions-- The night before surgery and the morning prior to coming to the hospital:   -Do not shave the surgical area.   -Shower and wash your hair and body as usual with anti-bacterial  soap and shampoo.   -Rinse your hair and body completely.   -Use one packet of hibiclens to wash the surgical site (using your hand) gently for 5 minutes.  Do not scrub you skin too hard.   -Do not use hibiclens on your head, face, or  genitals.   -Do not wash with anti-bacterial soap after you use the hibiclens.   -Rinse your body thoroughly.   -Dry with clean, soft towel.  Do not use lotion, cream, deodorant, or powders on   the surgical site.    Use antibacterial soap in place of hibiclens if your surgery is on the head, face or genitals.         Surgical Site Infection    Prevention of surgical site infections:     -Keep incisions clean and dry.   -Do not soak/submerge incisions in water until completely healed.   -Do not apply lotions, powders, creams, or deodorants to site.   -Always make sure hands are cleaned with antibacterial soap/ alcohol-based   prior to touching the surgical site.  (This includes doctors, nurses, staff, and yourself.)    Signs and symptoms:   -Redness and pain around the area where you had surgery   -Drainage of cloudy fluid from your surgical wound   -Fever over 100.4  I have read or had read and explained to me, and understand the above information.

## 2019-01-25 ENCOUNTER — LAB VISIT (OUTPATIENT)
Dept: LAB | Facility: HOSPITAL | Age: 39
End: 2019-01-25
Attending: UROLOGY
Payer: OTHER GOVERNMENT

## 2019-01-25 DIAGNOSIS — Z30.09 UNWANTED FERTILITY: ICD-10-CM

## 2019-01-25 LAB
ANION GAP SERPL CALC-SCNC: 6 MMOL/L
BASOPHILS # BLD AUTO: 0.09 K/UL
BASOPHILS NFR BLD: 1.9 %
BUN SERPL-MCNC: 12 MG/DL
CALCIUM SERPL-MCNC: 9.8 MG/DL
CHLORIDE SERPL-SCNC: 103 MMOL/L
CO2 SERPL-SCNC: 30 MMOL/L
CREAT SERPL-MCNC: 0.9 MG/DL
DIFFERENTIAL METHOD: NORMAL
EOSINOPHIL # BLD AUTO: 0.1 K/UL
EOSINOPHIL NFR BLD: 2.5 %
ERYTHROCYTE [DISTWIDTH] IN BLOOD BY AUTOMATED COUNT: 13 %
EST. GFR  (AFRICAN AMERICAN): >60 ML/MIN/1.73 M^2
EST. GFR  (NON AFRICAN AMERICAN): >60 ML/MIN/1.73 M^2
GLUCOSE SERPL-MCNC: 108 MG/DL
HCT VFR BLD AUTO: 48.2 %
HGB BLD-MCNC: 15.4 G/DL
IMM GRANULOCYTES # BLD AUTO: 0.02 K/UL
IMM GRANULOCYTES NFR BLD AUTO: 0.4 %
LYMPHOCYTES # BLD AUTO: 2.1 K/UL
LYMPHOCYTES NFR BLD: 44.2 %
MCH RBC QN AUTO: 28.1 PG
MCHC RBC AUTO-ENTMCNC: 32 G/DL
MCV RBC AUTO: 88 FL
MONOCYTES # BLD AUTO: 0.3 K/UL
MONOCYTES NFR BLD: 5.5 %
NEUTROPHILS # BLD AUTO: 2.2 K/UL
NEUTROPHILS NFR BLD: 45.5 %
NRBC BLD-RTO: 0 /100 WBC
PLATELET # BLD AUTO: 270 K/UL
PMV BLD AUTO: 10.2 FL
POTASSIUM SERPL-SCNC: 4 MMOL/L
RBC # BLD AUTO: 5.49 M/UL
SODIUM SERPL-SCNC: 139 MMOL/L
WBC # BLD AUTO: 4.75 K/UL

## 2019-01-25 PROCEDURE — 36415 COLL VENOUS BLD VENIPUNCTURE: CPT | Mod: PO

## 2019-01-25 PROCEDURE — 80048 BASIC METABOLIC PNL TOTAL CA: CPT

## 2019-01-25 PROCEDURE — 85025 COMPLETE CBC W/AUTO DIFF WBC: CPT

## 2019-01-30 ENCOUNTER — TELEPHONE (OUTPATIENT)
Dept: UROLOGY | Facility: CLINIC | Age: 39
End: 2019-01-30

## 2019-01-30 ENCOUNTER — ANESTHESIA EVENT (OUTPATIENT)
Dept: SURGERY | Facility: HOSPITAL | Age: 39
End: 2019-01-30
Payer: OTHER GOVERNMENT

## 2019-01-30 NOTE — TELEPHONE ENCOUNTER
Spoke with patient and confirmed surgery arrival time of 0730 with Dr. Flowers on tomorrow. Patient states understanding.

## 2019-01-31 ENCOUNTER — ANESTHESIA (OUTPATIENT)
Dept: SURGERY | Facility: HOSPITAL | Age: 39
End: 2019-01-31
Payer: OTHER GOVERNMENT

## 2019-01-31 ENCOUNTER — HOSPITAL ENCOUNTER (OUTPATIENT)
Facility: HOSPITAL | Age: 39
Discharge: HOME | End: 2019-01-31
Attending: UROLOGY | Admitting: UROLOGY
Payer: OTHER GOVERNMENT

## 2019-01-31 DIAGNOSIS — Z30.09 UNWANTED FERTILITY: ICD-10-CM

## 2019-01-31 PROCEDURE — 37000008 HC ANESTHESIA 1ST 15 MINUTES: Performed by: UROLOGY

## 2019-01-31 PROCEDURE — 55250 REMOVAL OF SPERM DUCT(S): CPT | Mod: ,,, | Performed by: UROLOGY

## 2019-01-31 PROCEDURE — 88341 TISSUE SPECIMEN TO PATHOLOGY - SURGERY: ICD-10-PCS | Mod: 26,,, | Performed by: PATHOLOGY

## 2019-01-31 PROCEDURE — 88341 IMHCHEM/IMCYTCHM EA ADD ANTB: CPT | Mod: 26,,, | Performed by: PATHOLOGY

## 2019-01-31 PROCEDURE — 63600175 PHARM REV CODE 636 W HCPCS: Performed by: NURSE ANESTHETIST, CERTIFIED REGISTERED

## 2019-01-31 PROCEDURE — 76870: ICD-10-PCS | Mod: 26,,, | Performed by: UROLOGY

## 2019-01-31 PROCEDURE — 55250 PR REMOVAL OF SPERM DUCT(S): ICD-10-PCS | Mod: ,,, | Performed by: UROLOGY

## 2019-01-31 PROCEDURE — 36000706: Performed by: UROLOGY

## 2019-01-31 PROCEDURE — 76870 US EXAM SCROTUM: CPT | Mod: 26,,, | Performed by: UROLOGY

## 2019-01-31 PROCEDURE — 00921 ANES PX MALE GENITALIA VASEC: CPT | Performed by: UROLOGY

## 2019-01-31 PROCEDURE — 37000009 HC ANESTHESIA EA ADD 15 MINS: Performed by: UROLOGY

## 2019-01-31 PROCEDURE — 25000003 PHARM REV CODE 250: Performed by: UROLOGY

## 2019-01-31 PROCEDURE — 71000015 HC POSTOP RECOV 1ST HR: Performed by: UROLOGY

## 2019-01-31 PROCEDURE — 25000003 PHARM REV CODE 250: Performed by: NURSE ANESTHETIST, CERTIFIED REGISTERED

## 2019-01-31 PROCEDURE — S0020 INJECTION, BUPIVICAINE HYDRO: HCPCS | Performed by: UROLOGY

## 2019-01-31 PROCEDURE — 25000003 PHARM REV CODE 250: Performed by: ANESTHESIOLOGY

## 2019-01-31 PROCEDURE — 71000039 HC RECOVERY, EACH ADD'L HOUR: Performed by: UROLOGY

## 2019-01-31 PROCEDURE — 88302 TISSUE EXAM BY PATHOLOGIST: CPT | Mod: 26,,, | Performed by: PATHOLOGY

## 2019-01-31 PROCEDURE — 71000033 HC RECOVERY, INTIAL HOUR: Performed by: UROLOGY

## 2019-01-31 PROCEDURE — 88302 TISSUE SPECIMEN TO PATHOLOGY - SURGERY: ICD-10-PCS | Mod: 26,,, | Performed by: PATHOLOGY

## 2019-01-31 PROCEDURE — 36000707: Performed by: UROLOGY

## 2019-01-31 PROCEDURE — 88342 TISSUE SPECIMEN TO PATHOLOGY - SURGERY: ICD-10-PCS | Mod: 26,,, | Performed by: PATHOLOGY

## 2019-01-31 PROCEDURE — 88341 IMHCHEM/IMCYTCHM EA ADD ANTB: CPT | Performed by: PATHOLOGY

## 2019-01-31 PROCEDURE — 88342 IMHCHEM/IMCYTCHM 1ST ANTB: CPT | Mod: 26,,, | Performed by: PATHOLOGY

## 2019-01-31 PROCEDURE — 88342 IMHCHEM/IMCYTCHM 1ST ANTB: CPT | Performed by: PATHOLOGY

## 2019-01-31 PROCEDURE — 63600175 PHARM REV CODE 636 W HCPCS: Performed by: ANESTHESIOLOGY

## 2019-01-31 RX ORDER — OXYCODONE AND ACETAMINOPHEN 5; 325 MG/1; MG/1
1-2 TABLET ORAL EVERY 4 HOURS PRN
Qty: 30 TABLET | Refills: 0 | Status: SHIPPED | OUTPATIENT
Start: 2019-01-31 | End: 2019-03-18

## 2019-01-31 RX ORDER — OXYCODONE HYDROCHLORIDE 5 MG/1
5 TABLET ORAL
Status: DISCONTINUED | OUTPATIENT
Start: 2019-01-31 | End: 2019-01-31 | Stop reason: HOSPADM

## 2019-01-31 RX ORDER — ACETAMINOPHEN 500 MG
1000 TABLET ORAL ONCE
Status: COMPLETED | OUTPATIENT
Start: 2019-01-31 | End: 2019-01-31

## 2019-01-31 RX ORDER — SODIUM CHLORIDE 0.9 % (FLUSH) 0.9 %
3 SYRINGE (ML) INJECTION
Status: DISCONTINUED | OUTPATIENT
Start: 2019-01-31 | End: 2019-01-31 | Stop reason: HOSPADM

## 2019-01-31 RX ORDER — CEFAZOLIN SODIUM 1 G/3ML
INJECTION, POWDER, FOR SOLUTION INTRAMUSCULAR; INTRAVENOUS
Status: DISCONTINUED | OUTPATIENT
Start: 2019-01-31 | End: 2019-01-31

## 2019-01-31 RX ORDER — SODIUM CHLORIDE 0.9 % (FLUSH) 0.9 %
3 SYRINGE (ML) INJECTION EVERY 8 HOURS
Status: DISCONTINUED | OUTPATIENT
Start: 2019-01-31 | End: 2019-01-31 | Stop reason: HOSPADM

## 2019-01-31 RX ORDER — BUPIVACAINE HYDROCHLORIDE 5 MG/ML
INJECTION, SOLUTION EPIDURAL; INTRACAUDAL
Status: DISCONTINUED | OUTPATIENT
Start: 2019-01-31 | End: 2019-01-31 | Stop reason: HOSPADM

## 2019-01-31 RX ORDER — FENTANYL CITRATE 50 UG/ML
INJECTION, SOLUTION INTRAMUSCULAR; INTRAVENOUS
Status: DISCONTINUED | OUTPATIENT
Start: 2019-01-31 | End: 2019-01-31

## 2019-01-31 RX ORDER — HYDROCODONE BITARTRATE AND ACETAMINOPHEN 5; 325 MG/1; MG/1
1 TABLET ORAL EVERY 4 HOURS PRN
Status: DISCONTINUED | OUTPATIENT
Start: 2019-01-31 | End: 2019-01-31 | Stop reason: HOSPADM

## 2019-01-31 RX ORDER — MIDAZOLAM HYDROCHLORIDE 1 MG/ML
INJECTION, SOLUTION INTRAMUSCULAR; INTRAVENOUS
Status: DISCONTINUED | OUTPATIENT
Start: 2019-01-31 | End: 2019-01-31

## 2019-01-31 RX ORDER — LIDOCAINE HYDROCHLORIDE 10 MG/ML
INJECTION, SOLUTION EPIDURAL; INFILTRATION; INTRACAUDAL; PERINEURAL
Status: DISCONTINUED | OUTPATIENT
Start: 2019-01-31 | End: 2019-01-31 | Stop reason: HOSPADM

## 2019-01-31 RX ORDER — HYDROCODONE BITARTRATE AND ACETAMINOPHEN 10; 325 MG/1; MG/1
1 TABLET ORAL EVERY 4 HOURS PRN
Status: DISCONTINUED | OUTPATIENT
Start: 2019-01-31 | End: 2019-01-31 | Stop reason: HOSPADM

## 2019-01-31 RX ORDER — METOCLOPRAMIDE HYDROCHLORIDE 5 MG/ML
10 INJECTION INTRAMUSCULAR; INTRAVENOUS EVERY 10 MIN PRN
Status: DISCONTINUED | OUTPATIENT
Start: 2019-01-31 | End: 2019-01-31 | Stop reason: HOSPADM

## 2019-01-31 RX ORDER — LIDOCAINE HYDROCHLORIDE 20 MG/ML
INJECTION, SOLUTION EPIDURAL; INFILTRATION; INTRACAUDAL; PERINEURAL
Status: DISCONTINUED | OUTPATIENT
Start: 2019-01-31 | End: 2019-01-31

## 2019-01-31 RX ORDER — MEPERIDINE HYDROCHLORIDE 50 MG/ML
12.5 INJECTION INTRAMUSCULAR; INTRAVENOUS; SUBCUTANEOUS ONCE AS NEEDED
Status: COMPLETED | OUTPATIENT
Start: 2019-01-31 | End: 2019-01-31

## 2019-01-31 RX ORDER — ONDANSETRON 2 MG/ML
INJECTION INTRAMUSCULAR; INTRAVENOUS
Status: DISCONTINUED | OUTPATIENT
Start: 2019-01-31 | End: 2019-01-31

## 2019-01-31 RX ORDER — PROPOFOL 10 MG/ML
VIAL (ML) INTRAVENOUS
Status: DISCONTINUED | OUTPATIENT
Start: 2019-01-31 | End: 2019-01-31

## 2019-01-31 RX ORDER — SODIUM CHLORIDE, SODIUM LACTATE, POTASSIUM CHLORIDE, CALCIUM CHLORIDE 600; 310; 30; 20 MG/100ML; MG/100ML; MG/100ML; MG/100ML
INJECTION, SOLUTION INTRAVENOUS CONTINUOUS
Status: DISCONTINUED | OUTPATIENT
Start: 2019-01-31 | End: 2019-01-31 | Stop reason: HOSPADM

## 2019-01-31 RX ORDER — DOCUSATE SODIUM 100 MG/1
100 CAPSULE, LIQUID FILLED ORAL 2 TIMES DAILY
Qty: 60 CAPSULE | Refills: 0 | Status: SHIPPED | OUTPATIENT
Start: 2019-01-31 | End: 2019-03-18

## 2019-01-31 RX ORDER — MORPHINE SULFATE 4 MG/ML
2 INJECTION, SOLUTION INTRAMUSCULAR; INTRAVENOUS EVERY 5 MIN PRN
Status: DISCONTINUED | OUTPATIENT
Start: 2019-01-31 | End: 2019-01-31 | Stop reason: HOSPADM

## 2019-01-31 RX ADMIN — PROPOFOL 50 MG: 10 INJECTION, EMULSION INTRAVENOUS at 09:01

## 2019-01-31 RX ADMIN — PROPOFOL 50 MG: 10 INJECTION, EMULSION INTRAVENOUS at 10:01

## 2019-01-31 RX ADMIN — ACETAMINOPHEN 1000 MG: 500 TABLET ORAL at 08:01

## 2019-01-31 RX ADMIN — LIDOCAINE HYDROCHLORIDE 40 MG: 20 INJECTION, SOLUTION EPIDURAL; INFILTRATION; INTRACAUDAL; PERINEURAL at 09:01

## 2019-01-31 RX ADMIN — MIDAZOLAM 2 MG: 1 INJECTION INTRAMUSCULAR; INTRAVENOUS at 09:01

## 2019-01-31 RX ADMIN — FENTANYL CITRATE 50 MCG: 50 INJECTION, SOLUTION INTRAMUSCULAR; INTRAVENOUS at 09:01

## 2019-01-31 RX ADMIN — PROPOFOL 250 MG: 10 INJECTION, EMULSION INTRAVENOUS at 09:01

## 2019-01-31 RX ADMIN — CEFAZOLIN 2 G: 1 INJECTION, POWDER, FOR SOLUTION INTRAMUSCULAR; INTRAVENOUS at 09:01

## 2019-01-31 RX ADMIN — OXYCODONE HYDROCHLORIDE 5 MG: 5 TABLET ORAL at 11:01

## 2019-01-31 RX ADMIN — MORPHINE SULFATE 2 MG: 4 INJECTION INTRAVENOUS at 11:01

## 2019-01-31 RX ADMIN — MEPERIDINE HYDROCHLORIDE 12.5 MG: 50 INJECTION, SOLUTION INTRAMUSCULAR; INTRAVENOUS; SUBCUTANEOUS at 11:01

## 2019-01-31 RX ADMIN — SODIUM CHLORIDE, SODIUM LACTATE, POTASSIUM CHLORIDE, AND CALCIUM CHLORIDE: 600; 310; 30; 20 INJECTION, SOLUTION INTRAVENOUS at 09:01

## 2019-01-31 RX ADMIN — ONDANSETRON 4 MG: 2 INJECTION, SOLUTION INTRAMUSCULAR; INTRAVENOUS at 10:01

## 2019-01-31 NOTE — DISCHARGE SUMMARY
Date of Discharge: 01/31/2019     Principle Diagnosis: Unwanted fertility    Secondary Diagnosis:  has no past medical history on file.    History of Present Illness: Pt was worked up in clinic and today's procedure was scheduled.  Please see H&P for full details.    Hospital Course: Pt presented on the day of surgery and after proper consents were obtained he was brought to the OR where his procedure was performed with some minor difficulty.  Vasectomy.    Discharge Disposition: Home    Followup Plan:  1. Pt is provided with medications for pain and others as indicated.  2. RTC in 2 weeks

## 2019-01-31 NOTE — OP NOTE
Date of Procedure: 01/31/2019    PREOPERATIVE DIAGNOSIS:  Unwanted Fertility.    POSTOPERATIVE DIAGNOSIS:  Same    PROCEDURES:    1. Vasectomy, bilateral with excision and scrotal exploration on left side; difficult  2. Doppler ultrasound of left testicular artery    SURGEON:  Flynn Flowers IV, M.D.    Assistants: None    Specimen: Bilateral vas deferens segments    Prosthetics, Devices, Grafts: None    Findings: Left spermatic cord was thick, with numerous vas-like structures.  One was veinous, and required a small vascular repair.  The artery was one, and confirmed with doppler ultrasound and uninjured.  The vas when identified was very fine and small, difficult to appreciate.  When divided an apparent lumen consistent with vas was appreciated.  Difficult case due to pt anatomy and unusually fine cord structures.    ANESTHESIA:  Local/MAC    PROCEDURE IN DETAIL:  Pt presented, and after proper consents were obtained, his pubic hair was clipped and his skin prepped and draped in normal sterile fashion in the supine position for vasectomy. The right vas was isolated and 1% lidocaine was instilled in the skin over the vas. The skin was opened sharply, and more local was applied into the vas. Ring forceps were used to isolate the vas and bring it out of the wound. The sheath overlying the vas was dissected away, and a length of vas exposed. A 1 cm segment of vas was excised, and each end was bovie'd and tied over itself using 4-0 chromic. After irrigation the skin was closed using 4-0 chromic after assuring excellent hemostasis. This was attempted on the left side.     The left vas segment could not be easily differentiated from other structures, as the cord was thick and numerous tubular structures were appreciated of similar size.  After a time a 3 cm midline incision was made and tissues overlying the left testicle and cord were dissected using bovie and blunt dissection, taking care to open the spermatic fascia  in layers to identify the vas deferens.  A number of large veins were appreciated and one was inadvertantly opened; this was repaired with a small segment of 5-0 prolene running.  The vas deferens was identified and  from the rest of the cord; it was divided with a small segment sent for pathologic examination.  The ends were bovied and tied with 4-0 chromic with excellent hemostasis.  The vascular dopper device was employed and a strong arterial pulse was identified in the other structure similar to the vas deferens that was taken.  There is a slight chance that the excised segment was not vas deferens as it was very fine, but it did have the characteristic gross appearance of a vas deferens.  This incision was then closed with 3-0 vicryl and 4-0 monocryl running with collodion top dressing.    The left and right vas segments were sent to pathology for examination. The patient tolerated this well and was discharged home in stable condition.    BLOOD LOSS:  10 ml.    BLOOD GIVEN:  None.    URINE OUTPUT:  None.    DRAINS:  None.    DISCHARGE DISPOSITION:  Home.    FOLLOWUP PLAN:  The patient will return to clinic in 3 mo for postop semen analysis; 2 week postop visit

## 2019-01-31 NOTE — ANESTHESIA RELEASE NOTE
Anesthesia Release from PACU Note    Patient: Matthew Graham Blakemore    Procedure(s) Performed: Procedure(s) (LRB):  VASECTOMY (Bilateral)    Anesthesia type: MAC    Post pain: Adequate analgesia    Post assessment: no apparent anesthetic complications, tolerated procedure well and no evidence of recall    Last Vitals:   Visit Vitals  /86   Pulse (!) 57   Temp 36.2 °C (97.1 °F) (Temporal)   Resp 12   Ht 6' (1.829 m)   Wt 85.6 kg (188 lb 11.4 oz)   SpO2 97%   BMI 25.59 kg/m²       Post vital signs: stable    Level of consciousness: awake, alert  and oriented    Nausea/Vomiting: no nausea/no vomiting    Complications: none    Airway Patency: patent    Respiratory: unassisted, spontaneous ventilation, room air    Cardiovascular: stable and blood pressure at baseline    Hydration: euvolemic

## 2019-01-31 NOTE — ANESTHESIA POSTPROCEDURE EVALUATION
Anesthesia Post Evaluation    Patient: Matthew Graham Blakemore    Procedure(s) Performed: Procedure(s) (LRB):  VASECTOMY (Bilateral)    Final Anesthesia Type: MAC  Patient location during evaluation: PACU  Patient participation: Yes- Able to Participate  Level of consciousness: awake and alert  Post-procedure vital signs: reviewed and stable  Pain management: adequate  Airway patency: patent  PONV status at discharge: No PONV  Anesthetic complications: no      Cardiovascular status: blood pressure returned to baseline  Respiratory status: unassisted  Hydration status: euvolemic  Follow-up not needed.        Visit Vitals  /70   Pulse 60   Temp 36.2 °C (97.1 °F) (Temporal)   Resp 15   Ht 6' (1.829 m)   Wt 85.6 kg (188 lb 11.4 oz)   SpO2 99%   BMI 25.59 kg/m²       Pain/Yamil Score: Pain Rating Prior to Med Admin: 6 (1/31/2019 11:45 AM)  Pain Rating Post Med Admin: 3 (1/31/2019 11:50 AM)  Yamil Score: 10 (1/31/2019 11:58 AM)

## 2019-01-31 NOTE — DISCHARGE INSTRUCTIONS
What to expect during recovery    Pain  · You will experience some level of pain after surgery.  Pain medication should help with the pain, but may not be able to eliminate it entirely.  Pain will decrease with time, and most pain will be gone by 4 to 6 weeks after surgery.  · Ice packs may help with pain and can reduce swelling.  · Your prescription pain medication may contain acetaminophen (Tylenol).  If so, you should not take additional acetaminophen (Tylenol) at the same time as your pain medication.   · Do not drive, operate machinery or power tools, or sign legal papers for 24 hours or as long as you are on your postoperative pain medication.   · Prescription pain medication should be taken only as directed.  We are not able to replace pain medication that has been lost or stolen.    Nausea/vomiting  · You may experience nausea or vomiting as a result of anesthesia or pain medication.  · If you experience severe nausea or you are unable to keep fluids down, contact your doctor.    Bleeding  · A small amount of clear or reddish drainage from the incision is normal after surgery.  · For mild bleeding from the incision, apply pressure for five minutes.  · If bleeding is severe or does not stop with pressure, contact your doctor.    Signs of infection  · Notify your doctor if you have the following signs of infection:  · Fever over 101 degrees  · Worsening redness around incsion  · Thick drainage from incision  · Foul smell from incision  · You may experience low fever/chills, this is normal after surgery.    Other post-operative symptoms  · It is safe to take over-the-counter medications for constipation, heartburn, sleep, or itching if needed.    · You may experience light-headedness, dizziness, and sleepiness following surgery. Please do not stay alone. A responsible adult should be with you for this 24 hour period.     Activity  · Try to rest and avoid strenuous activity, but also get out of bed regularly  unless your doctor has ordered strict bedrest.  · Several times every hour while you are awake, pump and flex your feet 5-6 times and do foot circles. This will help prevent blood clots.  · Several times every hour while you are awake, take 2 to 3 deep breaths and cough. If you had stomach surgery, hold a pillow or rolled towel firmly against your stomach before you cough. This will help with any pain the cough might cause.  · Do not smoke after surgery, it decreases your ability to heal and increases the risk of infection and pneumonia.    Nozin: Nasal   · Nozin reduces nasal germs to help decrease the risk of infection after surgery.  · Continue Nozin provided at discharge twice daily for 7 days or until the incision is healed.    · Place 4 drops to cotton swab tip and swab both nostril rims 6 times in each direction.  · See pamphlet for more information.     Diet  · Drink lots of fluids after surgery, unless otherwise instructed.  · You might not have much appetite at first.  Progress slowly to a normal diet unless given other specific diet instructions by your doctor.  Begin with liquids, then soup and crackers, working up to solid foods.  · Do not drink alcoholic beverages including beer for 24 hours or as long as you are on post-operative pain medication.    Follow-up after surgery  · You can contact your doctor through the patient portal using the [employer] [facility] or at [email or web address].  · You can also contact your doctor at any time by calling [phone] for the [facility] on [facility], or [phone] for the [facility] on [facility].  · A nurse will be calling you sometime after surgery. Do not be alarmed. This is our way of finding out how you are doing.

## 2019-01-31 NOTE — ANESTHESIA PREPROCEDURE EVALUATION
01/31/2019  Matthew Blakemore is a 38 y.o., male.    Anesthesia Evaluation    I have reviewed the Patient Summary Reports.    I have reviewed the Nursing Notes.   I have reviewed the Medications.     Review of Systems  Anesthesia Hx:  No problems with previous Anesthesia    Social:  Former Smoker    Cardiovascular:  Cardiovascular Normal     Pulmonary:  Pulmonary Normal snores   Neurological:  Neurology Normal    Endocrine:  Endocrine Normal        Physical Exam  General:  Well nourished    Airway/Jaw/Neck:  Airway Findings: Mouth Opening: Small, but > 3cm Mallampati: II  TM Distance: Normal, at least 6 cm      Dental:  Dental Findings: In tact   Chest/Lungs:  Chest/Lungs Findings: Clear to auscultation     Heart/Vascular:  Heart Findings: Rate: Normal  Rhythm: Regular Rhythm  Sounds: Normal             Anesthesia Plan  Type of Anesthesia, risks & benefits discussed:  Anesthesia Type:  general, MAC  Patient's Preference:   Intra-op Monitoring Plan:   Intra-op Monitoring Plan Comments:   Post Op Pain Control Plan:   Post Op Pain Control Plan Comments:   Induction:    Beta Blocker:  Patient is not currently on a Beta-Blocker (No further documentation required).       Informed Consent:  Anesthesia consent signed with patient.  ASA Score: 1     Day of Surgery Review of History & Physical: I have interviewed and examined the patient. I have reviewed the patient's H&P dated:  There are no significant changes.          Ready For Surgery From Anesthesia Perspective.

## 2019-01-31 NOTE — TRANSFER OF CARE
Anesthesia Transfer of Care Note    Patient: Matthew Graham Blakemore    Procedure(s) Performed: Procedure(s) (LRB):  VASECTOMY (Bilateral)    Patient location: PACU    Anesthesia Type: MAC    Transport from OR: Transported from OR on room air with adequate spontaneous ventilation    Post pain: adequate analgesia    Post assessment: no apparent anesthetic complications and tolerated procedure well    Post vital signs: stable    Level of consciousness: awake and responds to stimulation    Nausea/Vomiting: no nausea/vomiting    Complications: none    Transfer of care protocol was followed      Last vitals:   Visit Vitals  /86   Pulse (!) 57   Temp 36.2 °C (97.1 °F) (Temporal)   Resp 12   Ht 6' (1.829 m)   Wt 85.6 kg (188 lb 11.4 oz)   SpO2 97%   BMI 25.59 kg/m²

## 2019-01-31 NOTE — H&P
Chief Complaint: Unwanted Fertility    HPI:   1/31/19: Ready for vasectomy today.  No changes.  9/5/18: 39 yo man with 5 children desires no more; referred by Dr. Perez.  No abd/pelvic pain and no exac/rel factors.  No hematuria.  No urolithiasis.  No urinary bother.  No  history.  Normal sexual function.    Allergies:  Penicillins    Medications:  has a current medication list which includes the following prescription(s): ofloxacin.    Review of Systems:  General: No fever, chills, fatigability, or weight loss.  Skin: No rashes, itching, or changes in color or texture of skin.  Chest: Denies SAVAGE, cyanosis, wheezing, cough, and sputum production.  Abdomen: Appetite fine. No weight loss. Denies diarrhea, abdominal pain, hematemesis, or blood in stool.  Musculoskeletal: No joint stiffness or swelling. Denies back pain.  : As above.  All other review of systems negative.    PMH:   has no past medical history on file.    PSH:   has a past surgical history that includes Hernia repair.    FamHx: family history includes Heart attacks under age 50 in his maternal grandfather; Melanoma in his brother, father, mother, and sister.    SocHx:  reports that he has quit smoking. he has never used smokeless tobacco. He reports that he does not drink alcohol or use drugs.      Physical Exam:  Vitals:    01/31/19 0755   BP: (!) 148/63   Pulse: 64   Resp: 18   Temp: 98.8 °F (37.1 °C)     General: A&Ox3, no apparent distress, no deformities  Neck: No masses, normal thyroid  Lungs: normal inspiration, no use of accessory muscles  Heart: normal pulse, no arrhythmias  Abdomen: Soft, NT, ND  Skin: The skin is warm and dry. No jaundice.  Ext: No c/c/e.  :   9/18: Test desc adela, no abnormalities of epididymus. Penis normal, with normal penile and scrotal skin. Meatus normal.     Labs/Studies: Urinalysis performed in clinic, summary: UA normal    Impression/Plan:   1. To OR as scheduled.  Postop instructions reviewed with pt.

## 2019-02-06 ENCOUNTER — TELEPHONE (OUTPATIENT)
Dept: UROLOGY | Facility: CLINIC | Age: 39
End: 2019-02-06

## 2019-02-06 VITALS
SYSTOLIC BLOOD PRESSURE: 125 MMHG | TEMPERATURE: 97 F | RESPIRATION RATE: 15 BRPM | HEART RATE: 60 BPM | OXYGEN SATURATION: 99 % | DIASTOLIC BLOOD PRESSURE: 70 MMHG | HEIGHT: 72 IN | WEIGHT: 188.69 LBS | BODY MASS INDEX: 25.56 KG/M2

## 2019-02-06 NOTE — TELEPHONE ENCOUNTER
Patient had vasectomy in OR on 1/31/19 and wants to know when the swelling will subside. Advised patient swelling may last a week or two. Patient denies pain, fever, nausea, vomiting, or signs of infection. Patient states understanding.

## 2019-02-06 NOTE — TELEPHONE ENCOUNTER
Regarding: RE: Reminder for Upcoming Procedure  Contact: [phone]  ----- Message from [facility], System Message sent at 2/5/2019  8:21 PM CST -----    can someone call me tomorrow Please.     Jarocho  [phone]    ----- Message -----  From: Travis Flowers IV, MD  Sent: 1/24/19, 8:30 AM  To: Matthew Graham Blakemore  Subject: Reminder for Upcoming Procedure    [facility] [address] [place] [address]    01/24/2019      Dear your,      This is a reminder for your upcoming procedure with Travis Flowers IV, MD on 1/31/2019. We will contact you again before the day of your procedure with your scheduled arrival time.       If you have questions or scheduling concerns, you can contact your physicians office:   Travis Flowers IV, MD  Phone Number: [phone]      Sincerely,     [facility] [address] [place] [address]

## 2019-02-14 ENCOUNTER — OFFICE VISIT (OUTPATIENT)
Dept: UROLOGY | Facility: CLINIC | Age: 39
End: 2019-02-14
Payer: OTHER GOVERNMENT

## 2019-02-14 VITALS
DIASTOLIC BLOOD PRESSURE: 76 MMHG | HEIGHT: 72 IN | HEART RATE: 78 BPM | BODY MASS INDEX: 26.51 KG/M2 | SYSTOLIC BLOOD PRESSURE: 118 MMHG | WEIGHT: 195.75 LBS

## 2019-02-14 DIAGNOSIS — Z30.09 UNWANTED FERTILITY: Primary | ICD-10-CM

## 2019-02-14 LAB
BILIRUB SERPL-MCNC: NORMAL MG/DL
BLOOD URINE, POC: NORMAL
COLOR, POC UA: YELLOW
GLUCOSE UR QL STRIP: NORMAL
KETONES UR QL STRIP: NORMAL
LEUKOCYTE ESTERASE URINE, POC: NORMAL
NITRITE, POC UA: NORMAL
PH, POC UA: 7
PROTEIN, POC: NORMAL
SPECIFIC GRAVITY, POC UA: 1.01
UROBILINOGEN, POC UA: NORMAL

## 2019-02-14 PROCEDURE — 99999 PR PBB SHADOW E&M-EST. PATIENT-LVL II: ICD-10-PCS | Mod: PBBFAC,,, | Performed by: UROLOGY

## 2019-02-14 PROCEDURE — 81002 URINALYSIS NONAUTO W/O SCOPE: CPT | Mod: PBBFAC | Performed by: UROLOGY

## 2019-02-14 PROCEDURE — 99024 POSTOP FOLLOW-UP VISIT: CPT | Mod: ,,, | Performed by: UROLOGY

## 2019-02-14 PROCEDURE — 99999 PR PBB SHADOW E&M-EST. PATIENT-LVL II: CPT | Mod: PBBFAC,,, | Performed by: UROLOGY

## 2019-02-14 PROCEDURE — 99212 OFFICE O/P EST SF 10 MIN: CPT | Mod: PBBFAC | Performed by: UROLOGY

## 2019-02-14 PROCEDURE — 99024 PR POST-OP FOLLOW-UP VISIT: ICD-10-PCS | Mod: ,,, | Performed by: UROLOGY

## 2019-02-14 NOTE — PROGRESS NOTES
Chief Complaint: Unwanted Fertility    HPI:   2/14/19: No pain to speak of after recently vasectomy.  Still having left testalgia and still swollen.  Left cord was explored and no obvious vas was isolated; the structure taken was a small artery.  Unclear if fertility will be achieved.  9/5/18: 39 yo man with 5 children desires no more; referred by Dr. Perez.  No abd/pelvic pain and no exac/rel factors.  No hematuria.  No urolithiasis.  No urinary bother.  No  history.  Normal sexual function.    Allergies:  Penicillins    Medications:  has a current medication list which includes the following prescription(s): oxycodone-acetaminophen and docusate sodium.    Review of Systems:  General: No fever, chills, fatigability, or weight loss.  Skin: No rashes, itching, or changes in color or texture of skin.  Chest: Denies SAVAGE, cyanosis, wheezing, cough, and sputum production.  Abdomen: Appetite fine. No weight loss. Denies diarrhea, abdominal pain, hematemesis, or blood in stool.  Musculoskeletal: No joint stiffness or swelling. Denies back pain.  : As above.  All other review of systems negative.    PMH:   has no past medical history on file.    PSH:   has a past surgical history that includes Hernia repair; Vasectomy (Bilateral, 1/31/2019); and Ultrasound guidance (Left, 1/31/2019).    FamHx: family history includes Heart attacks under age 50 in his maternal grandfather; Melanoma in his brother, father, mother, and sister.    SocHx:  reports that he has quit smoking. he has never used smokeless tobacco. He reports that he does not drink alcohol or use drugs.      Physical Exam:  Vitals:    02/14/19 1258   BP: 118/76   Pulse: 78     General: A&Ox3, no apparent distress, no deformities  Neck: No masses, normal thyroid  Lungs: normal inspiration, no use of accessory muscles  Heart: normal pulse, no arrhythmias  Abdomen: Soft, NT, ND, no masses, no hernias, no hepatosplenomegaly  Lymphatic: Neck and groin nodes  negative  Skin: The skin is warm and dry. No jaundice.  Ext: No c/c/e.  :   2/14/19: scrotal wound healing well, left testicle swollen still, cord swollen still  9/18: Test desc adela, no abnormalities of epididymus. Penis normal, with normal penile and scrotal skin. Meatus normal.     Labs/Studies: Urinalysis performed in clinic, summary: UA normal    Impression/Plan:   1. RTC 3 mo for semen analysis.

## 2019-03-14 ENCOUNTER — HOSPITAL ENCOUNTER (OUTPATIENT)
Dept: RADIOLOGY | Facility: HOSPITAL | Age: 39
Discharge: HOME | End: 2019-03-14
Attending: PHYSICIAN ASSISTANT
Payer: OTHER GOVERNMENT

## 2019-03-14 ENCOUNTER — OFFICE VISIT (OUTPATIENT)
Dept: INTERNAL MEDICINE | Facility: CLINIC | Age: 39
End: 2019-03-14
Payer: OTHER GOVERNMENT

## 2019-03-14 VITALS
DIASTOLIC BLOOD PRESSURE: 70 MMHG | WEIGHT: 203.94 LBS | HEIGHT: 72 IN | SYSTOLIC BLOOD PRESSURE: 124 MMHG | BODY MASS INDEX: 27.62 KG/M2 | HEART RATE: 80 BPM | TEMPERATURE: 98 F

## 2019-03-14 DIAGNOSIS — G89.29 CHRONIC LEFT SHOULDER PAIN: ICD-10-CM

## 2019-03-14 DIAGNOSIS — M23.8X2 CREPITUS OF BOTH KNEE JOINTS: ICD-10-CM

## 2019-03-14 DIAGNOSIS — M23.8X1 CREPITUS OF BOTH KNEE JOINTS: ICD-10-CM

## 2019-03-14 DIAGNOSIS — M25.512 CHRONIC LEFT SHOULDER PAIN: ICD-10-CM

## 2019-03-14 DIAGNOSIS — G89.29 CHRONIC LEFT SHOULDER PAIN: Primary | ICD-10-CM

## 2019-03-14 DIAGNOSIS — M25.512 CHRONIC LEFT SHOULDER PAIN: Primary | ICD-10-CM

## 2019-03-14 PROCEDURE — 99999 PR PBB SHADOW E&M-EST. PATIENT-LVL IV: CPT | Mod: PBBFAC,,, | Performed by: PHYSICIAN ASSISTANT

## 2019-03-14 PROCEDURE — 73030 X-RAY EXAM OF SHOULDER: CPT | Mod: 26,LT,, | Performed by: RADIOLOGY

## 2019-03-14 PROCEDURE — 73562 XR KNEE 3 VIEW BILATERAL: ICD-10-PCS | Mod: 26,50,, | Performed by: RADIOLOGY

## 2019-03-14 PROCEDURE — 73562 X-RAY EXAM OF KNEE 3: CPT | Mod: 26,50,, | Performed by: RADIOLOGY

## 2019-03-14 PROCEDURE — 99214 OFFICE O/P EST MOD 30 MIN: CPT | Mod: S$PBB,,, | Performed by: PHYSICIAN ASSISTANT

## 2019-03-14 PROCEDURE — 99214 PR OFFICE/OUTPT VISIT, EST, LEVL IV, 30-39 MIN: ICD-10-PCS | Mod: S$PBB,,, | Performed by: PHYSICIAN ASSISTANT

## 2019-03-14 PROCEDURE — 73030 X-RAY EXAM OF SHOULDER: CPT | Mod: TC,FY,PO,LT

## 2019-03-14 PROCEDURE — 99999 PR PBB SHADOW E&M-EST. PATIENT-LVL IV: ICD-10-PCS | Mod: PBBFAC,,, | Performed by: PHYSICIAN ASSISTANT

## 2019-03-14 PROCEDURE — 99214 OFFICE O/P EST MOD 30 MIN: CPT | Mod: PBBFAC,25,PO | Performed by: PHYSICIAN ASSISTANT

## 2019-03-14 PROCEDURE — 73562 X-RAY EXAM OF KNEE 3: CPT | Mod: 50,TC,FY,PO

## 2019-03-14 PROCEDURE — 73030 XR SHOULDER COMPLETE 2 OR MORE VIEWS LEFT: ICD-10-PCS | Mod: 26,LT,, | Performed by: RADIOLOGY

## 2019-03-14 NOTE — PROGRESS NOTES
Subjective:       Patient ID: Matthew Graham Blakemore is a 38 y.o. male.    Chief Complaint: Shoulder Pain  Patient comes in today for on going shoulder pain,    Pain with lying down, any kind of throwing motion     Unsure if gradual     Took motrin, but no help. Worse with certain positions     also has cracking in knees. Unsure when this started. He is in  so he has been very active for several years         Health Maintenance Due   Topic Date Due    TETANUS VACCINE  08/17/1997    Pneumococcal Vaccine (Medium Risk) (1 of 1 - PPSV23) 08/17/1998    Influenza Vaccine  08/01/2018       History reviewed. No pertinent past medical history.    Current Outpatient Medications   Medication Sig Dispense Refill    sulfamethoxazole-trimethoprim 800-160mg (BACTRIM DS) 800-160 mg Tab Take 1 tablet by mouth 2 (two) times daily. for 14 days 28 tablet 0     No current facility-administered medications for this visit.        Review of Systems   Constitutional: Negative for activity change and unexpected weight change.   HENT: Negative for hearing loss, rhinorrhea and trouble swallowing.    Eyes: Negative for discharge and visual disturbance.   Respiratory: Negative for chest tightness and wheezing.    Cardiovascular: Negative for chest pain and palpitations.   Gastrointestinal: Negative for blood in stool, constipation, diarrhea and vomiting.   Endocrine: Negative for polydipsia and polyuria.   Genitourinary: Negative for difficulty urinating, hematuria and urgency.   Musculoskeletal: Positive for arthralgias. Negative for joint swelling and neck pain.   Neurological: Positive for weakness. Negative for headaches.   Psychiatric/Behavioral: Negative for confusion and dysphoric mood.       Objective:   /70   Pulse 80   Temp 98.4 °F (36.9 °C) (Oral)   Ht 6' (1.829 m)   Wt 92.5 kg (203 lb 14.8 oz)   BMI 27.66 kg/m²      Physical Exam   Constitutional: He appears well-developed and well-nourished. No distress.    HENT:   Head: Normocephalic and atraumatic.   Right Ear: External ear normal.   Left Ear: External ear normal.   Mouth/Throat: Oropharynx is clear and moist.   Musculoskeletal:        Left shoulder: He exhibits decreased range of motion, tenderness and decreased strength. He exhibits no effusion and no crepitus.        Right knee: He exhibits normal range of motion, no swelling, no effusion and normal patellar mobility. No tenderness found.        Left knee: He exhibits normal range of motion, no swelling, no effusion and normal patellar mobility. No tenderness found.   Bilateral crepitus with bending            Lab Results   Component Value Date    WBC 4.75 01/25/2019    HGB 15.4 01/25/2019    HCT 48.2 01/25/2019     01/25/2019    CHOL 154 06/12/2018    TRIG 102 06/12/2018    HDL 28 (L) 06/12/2018     01/25/2019    K 4.0 01/25/2019     01/25/2019    CREATININE 0.9 01/25/2019    BUN 12 01/25/2019    CO2 30 (H) 01/25/2019       Assessment:       1. Chronic left shoulder pain    2. Crepitus of both knee joints        Plan:   Chronic left shoulder pain  -     X-Ray Shoulder 2 or More Views Left; Future; Expected date: 03/14/2019  -     Ambulatory referral to Orthopedics    Crepitus of both knee joints  -     Cancel: X-ray Knee Ortho Bilateral with Flexion; Future; Expected date: 03/14/2019    Ortho referral   Stop NSAIDs, Patient has been on ibupforen x months, concern for overload on this medication

## 2019-03-18 ENCOUNTER — OFFICE VISIT (OUTPATIENT)
Dept: UROLOGY | Facility: CLINIC | Age: 39
End: 2019-03-18
Payer: OTHER GOVERNMENT

## 2019-03-18 ENCOUNTER — PATIENT MESSAGE (OUTPATIENT)
Dept: UROLOGY | Facility: CLINIC | Age: 39
End: 2019-03-18

## 2019-03-18 VITALS — HEIGHT: 72 IN | BODY MASS INDEX: 27.25 KG/M2 | WEIGHT: 201.19 LBS

## 2019-03-18 DIAGNOSIS — N99.842 SEROMA OF GENITOURINARY SYSTEM AFTER GENITOURINARY SYSTEM PROCEDURE: Primary | ICD-10-CM

## 2019-03-18 PROCEDURE — 99024 PR POST-OP FOLLOW-UP VISIT: ICD-10-PCS | Mod: S$PBB,,, | Performed by: UROLOGY

## 2019-03-18 PROCEDURE — 81002 URINALYSIS NONAUTO W/O SCOPE: CPT | Mod: PBBFAC | Performed by: UROLOGY

## 2019-03-18 PROCEDURE — 99999 PR PBB SHADOW E&M-EST. PATIENT-LVL II: CPT | Mod: PBBFAC,,, | Performed by: UROLOGY

## 2019-03-18 PROCEDURE — 99212 OFFICE O/P EST SF 10 MIN: CPT | Mod: PBBFAC | Performed by: UROLOGY

## 2019-03-18 PROCEDURE — 99999 PR PBB SHADOW E&M-EST. PATIENT-LVL II: ICD-10-PCS | Mod: PBBFAC,,, | Performed by: UROLOGY

## 2019-03-18 PROCEDURE — 99024 POSTOP FOLLOW-UP VISIT: CPT | Mod: S$PBB,,, | Performed by: UROLOGY

## 2019-03-18 RX ORDER — SULFAMETHOXAZOLE AND TRIMETHOPRIM 800; 160 MG/1; MG/1
1 TABLET ORAL 2 TIMES DAILY
Qty: 28 TABLET | Refills: 0 | Status: SHIPPED | OUTPATIENT
Start: 2019-03-18 | End: 2019-03-22

## 2019-03-18 NOTE — PROGRESS NOTES
Chief Complaint: Unwanted Fertility    HPI:   3/18/19: Had some discharge from the top of his scrotal incision.  Location is right where the last tie of monocryl would be at the top of the incision.  2/14/19: No pain to speak of after recently vasectomy.  Still having left testalgia and still swollen.  Left cord was explored and no obvious vas was isolated; the structure taken was a small artery.  Unclear if fertility will be achieved.  9/5/18: 39 yo man with 5 children desires no more; referred by Dr. Perez.  No abd/pelvic pain and no exac/rel factors.  No hematuria.  No urolithiasis.  No urinary bother.  No  history.  Normal sexual function.    Allergies:  Penicillins    Medications:  currently has no medications in their medication list.    Review of Systems:  General: No fever, chills, fatigability, or weight loss.  Skin: No rashes, itching, or changes in color or texture of skin.  Chest: Denies SAVAGE, cyanosis, wheezing, cough, and sputum production.  Abdomen: Appetite fine. No weight loss. Denies diarrhea, abdominal pain, hematemesis, or blood in stool.  Musculoskeletal: No joint stiffness or swelling. Denies back pain.  : As above.  All other review of systems negative.    PMH:   has no past medical history on file.    PSH:   has a past surgical history that includes Hernia repair; Vasectomy (Bilateral, 1/31/2019); and Ultrasound guidance (Left, 1/31/2019).    FamHx: family history includes Heart attacks under age 50 in his maternal grandfather; Melanoma in his brother, father, mother, and sister.    SocHx:  reports that he has been smoking vaping w/o nicotine.  he has never used smokeless tobacco. He reports that he does not drink alcohol or use drugs.      Physical Exam:  There were no vitals filed for this visit.  General: A&Ox3, no apparent distress, no deformities  Neck: No masses, normal thyroid  Lungs: normal inspiration, no use of accessory muscles  Heart: normal pulse, no arrhythmias  Abdomen: Soft,  NT, ND, no masses, no hernias, no hepatosplenomegaly  Lymphatic: Neck and groin nodes negative  Skin: The skin is warm and dry. No jaundice.  Ext: No c/c/e.  :   3/18/19: Has some drainage at the top of the incision on the scrotum, two dots draining with a small josué in between not fluctuant; like a stitch seroma  2/14/19: scrotal wound healing well, left testicle swollen still, cord swollen still  9/18: Test desc adela, no abnormalities of epididymus. Penis normal, with normal penile and scrotal skin. Meatus normal.     Labs/Studies: Urinalysis performed in clinic, summary: UA normal    Impression/Plan:   1. RTC 3 mo for semen analysis as prev planned.  2. Bactrim Abx and recheck 2 weeks empirically

## 2019-03-22 ENCOUNTER — OFFICE VISIT (OUTPATIENT)
Dept: ORTHOPEDICS | Facility: CLINIC | Age: 39
End: 2019-03-22
Payer: OTHER GOVERNMENT

## 2019-03-22 VITALS
BODY MASS INDEX: 27.22 KG/M2 | SYSTOLIC BLOOD PRESSURE: 114 MMHG | DIASTOLIC BLOOD PRESSURE: 67 MMHG | RESPIRATION RATE: 18 BRPM | HEART RATE: 78 BPM | HEIGHT: 72 IN | WEIGHT: 201 LBS

## 2019-03-22 DIAGNOSIS — G89.29 CHRONIC LEFT SHOULDER PAIN: ICD-10-CM

## 2019-03-22 DIAGNOSIS — M77.8 LEFT SHOULDER TENDINITIS: Primary | ICD-10-CM

## 2019-03-22 DIAGNOSIS — M25.512 CHRONIC LEFT SHOULDER PAIN: ICD-10-CM

## 2019-03-22 PROCEDURE — 20610 DRAIN/INJ JOINT/BURSA W/O US: CPT | Mod: S$PBB,LT,, | Performed by: FAMILY MEDICINE

## 2019-03-22 PROCEDURE — 99213 PR OFFICE/OUTPT VISIT, EST, LEVL III, 20-29 MIN: ICD-10-PCS | Mod: S$PBB,25,, | Performed by: FAMILY MEDICINE

## 2019-03-22 PROCEDURE — 99213 OFFICE O/P EST LOW 20 MIN: CPT | Mod: PBBFAC,PN | Performed by: FAMILY MEDICINE

## 2019-03-22 PROCEDURE — 99213 OFFICE O/P EST LOW 20 MIN: CPT | Mod: S$PBB,25,, | Performed by: FAMILY MEDICINE

## 2019-03-22 PROCEDURE — 99999 PR PBB SHADOW E&M-EST. PATIENT-LVL III: ICD-10-PCS | Mod: PBBFAC,,, | Performed by: FAMILY MEDICINE

## 2019-03-22 PROCEDURE — 99999 PR PBB SHADOW E&M-EST. PATIENT-LVL III: CPT | Mod: PBBFAC,,, | Performed by: FAMILY MEDICINE

## 2019-03-22 PROCEDURE — 20610 PR DRAIN/INJECT LARGE JOINT/BURSA: ICD-10-PCS | Mod: S$PBB,LT,, | Performed by: FAMILY MEDICINE

## 2019-03-22 PROCEDURE — 20610 DRAIN/INJ JOINT/BURSA W/O US: CPT | Mod: PBBFAC,PN | Performed by: FAMILY MEDICINE

## 2019-03-22 NOTE — PROGRESS NOTES
Subjective:     Patient ID: Matthew Graham Blakemore is a 38 y.o. male.    Chief Complaint: Pain of the Left Shoulder      HPI:  Patient denies any specific injury but states that several months ago he started noticing pain in the left shoulder which affected his sleeping any time he would roll to the shoulder.  No also states that any type of throwing motion increases the pain. He was taking high doses of ibuprofen but has decreased that now and will take Naprosyn 2 to 3 times a day as needed.  He also knows now to use ice and heat as needed.    Patient has not noticed any discoloration or swelling of the left shoulder upper extremity.  He says his strength has been maintained.  No past medical history on file.  Past Surgical History:   Procedure Laterality Date    EXPLORATION, SCROTUM Left 1/31/2019    Performed by [facility] at [facility]    HERNIA REPAIR      ULTRASOUND GUIDANCE Left 1/31/2019    Performed by [facility] at [facility] OR    VASECTOMY Bilateral 1/31/2019    Performed by Travis Flowers IV, MD at [facility] OR     Family History   Problem Relation Age of Onset    Heart attacks under age 50 Maternal Grandfather     Melanoma Mother     Melanoma Father     Melanoma Sister     Melanoma Brother      Social History     Socioeconomic History    Marital status:      Spouse name: Not on file    Number of children: Not on file    Years of education: Not on file    Highest education level: Not on file   Social Needs    Financial resource strain: Not on file    Food insecurity - worry: Not on file    Food insecurity - inability: Not on file    Transportation needs - medical: Not on file    Transportation needs - non-medical: Not on file   Occupational History    Not on file   Tobacco Use    Smoking status: Current Every Day Smoker     Types: Vaping w/o nicotine    Smokeless tobacco: Never Used   Substance and Sexual Activity    Alcohol use: No    Drug use: No     Sexual activity: Not on file   Other Topics Concern    Not on file   Social History Narrative    Not on file     No current outpatient medications on file.  Review of patient's allergies indicates:   Allergen Reactions    Penicillins Other (See Comments)     unknown     Review of Systems   Constitutional: Negative.    Respiratory: Negative for shortness of breath.    Cardiovascular: Negative for chest pain.   Musculoskeletal: Positive for joint pain.       Objective:   Body mass index is 27.26 kg/m².  Vitals:    03/22/19 1638   BP: 114/67   Pulse: 78   Resp: 18           Ortho/SPM Exam-patient is alert and oriented well-nourished well-developed, ambulatory adult male    Left shoulder-positive impingement signs with internal rotation.  External rotation is normal.  Patient has full abduction to 140°.  Has good deltoid strength with resisted abduction.  Patient has severe pain and some weakness with resisted empty can position.  Patient has no specific points of tenderness and shoulder region.  Neurovascular intact    IMAGING     Radiographs / Imaging : Relevant imaging results reviewed by me and interpreted by me, discussed with the patient and / or family   Procedure note-left shoulder was prepped and draped in the appropriate sterile fashion and 23 gauge needle was introduced in the so acromial space without difficulty.  1.5 cc Depo-Medrol, 2 cc of lidocaine and 1 cc of bupivacaine was injected.  The patient tolerated the procedure well and there were no complications.    Patient was instructed in proper icing of the shoulder over the next few days and to restrict lifting or activity of the shoulder for the next week.    Assessment:     Encounter Diagnoses   Name Primary?    Chronic left shoulder pain     Left shoulder tendinitis Yes        Plan:   Left shoulder tendinitis    Chronic left shoulder pain          Mendel Lucio M.D.  [facility]

## 2019-03-22 NOTE — LETTER
March 22, 2019      SARAY Garza  [address]  [place] [address]           [facility]  [address]  [place] [address]  Phone: [phone]  Fax: [phone]          Patient: Matthew Graham Blakemore   MR Number: [id number]   YOB: 1980   Date of Visit: 3/22/2019       Dear Darleen Perez:    Thank you for referring Matthew Blakemore to me for evaluation. Attached you will find relevant portions of my assessment and plan of care.    If you have questions, please do not hesitate to call me. I look forward to following Matthew Blakemore along with you.    Sincerely,    Mendel Lucio MD    Enclosure  CC:  No Recipients    If you would like to receive this communication electronically, please contact [email or web address] or [phone] to request more information on [employer] Link access.    For providers and/or their staff who would like to refer a patient to [facility], please contact us through our one-stop-shop provider referral line, [facility], at [phone].    If you feel you have received this communication in error or would no longer like to receive these types of communications, please e-mail [email or web address]

## 2019-04-01 ENCOUNTER — CLINICAL SUPPORT (OUTPATIENT)
Dept: REHABILITATION | Facility: HOSPITAL | Age: 39
End: 2019-04-01
Payer: OTHER GOVERNMENT

## 2019-04-01 DIAGNOSIS — M77.8 TENDINITIS OF LEFT SHOULDER: Primary | ICD-10-CM

## 2019-04-01 PROCEDURE — 97535 SELF CARE MNGMENT TRAINING: CPT

## 2019-04-01 PROCEDURE — 97162 PT EVAL MOD COMPLEX 30 MIN: CPT

## 2019-04-01 NOTE — PROGRESS NOTES
[facility]  Physical Therapy Initial Evaluation    Name: Matthew Graham Blakemore  Clinic Number: [id number]    Therapy Diagnosis:   Encounter Diagnosis   Name Primary?    Tendinitis of left shoulder Yes     Physician: Mendel Lucio MD    Physician Orders: PT Eval and Treat   Medical Diagnosis from Referral: L shoulder tendinitis   Evaluation Date: 4/1/2019  Authorization Period Expiration: 7/24/19  Plan of Care Expiration: 6/30/19  Visit # / Visits authorized: 1/16    Time In: 1:10  Time Out: 1:59  Total Billable Time: 49 minutes    Precautions: Standard    Subjective   Date of onset: 8 months   History of current condition - Richard reports: gradual onset of L shoulder pain. He received a cortisone shot 2 weeks ago, pt states this has helped with ROM but he is still experiencing pain. Pt started having trouble sleeping ~2 months ago, difficult to sleep on his left side due to pain. Pt reports broken collar bone on L when he was 4 years old. Pt reports no hx of R shoulder pain. Pt states that he has to be able to do 42 push ups for work; therefore, he does everything to avoid aggravating his shoulder so that he is able to complete this task.    Medical History:   No past medical history on file.    Surgical History:   Matthew Graham Blakemore  has a past surgical history that includes Hernia repair; Vasectomy (Bilateral, 1/31/2019); and Ultrasound guidance (Left, 1/31/2019).    Medications:   Richard currently has no medications in their medication list.    Allergies:   Review of patient's allergies indicates:   Allergen Reactions    Penicillins Other (See Comments)     unknown      Imaging, X-ray of shoulder on 3/14/19    Prior Therapy: No  Social History: lives with their family  Occupation:  for the [employer] job  Prior Level of Function: full function, no issues   Current Level of Function: Full function with pain     Pain:  Current 6/10, worst 10/10, best 2/10    Location: left shoulder   Description: Aching and Sharp  Aggravating Factors: random movements, driving, sleeping,  Easing Factors: injection and rest    Pts goals: Pt goals are to be able to perform ADL's, do pushups, and throw a ball without pain.     Objective     Sensation: Sensation is intact to light touch    ROM   Right (degrees) Left (degrees)   Shoulder Flexion  168 168, pain with AROM    Shoulder Abduction   Pain , 139 , opain with AROM    Shoulder Extension WFL WFL    Shoulder Internal Rotation 35 Pain, WFL 62   Shoulder External Rotation 75 64     Slight scapular winging with repeated shoulder flexion     Joint Mobility   Right Left    Thoracic PA [place] Hypomobile Hypomobile   Thoracic PA [place]  Hypomobile Hypomobile   Rib Inhalation Normal Normal   Rib Exhalation  Hypomobile Hypomobile   Glenohumeral Distraction Normal Normal   Glenohumeral Inferior [place] Normal Hypomobile   Glenohumeral Anterior Glide Normal Hypomobile, pain    Glenohumeral Posterior Glide  Normal Normal     Strength   Right    Left   Shoulder Flexion 4+/5 4-/5   Shoulder Abduction 4+/5 4-/5   Shoulder Extension  5/5 4+/5   Shoulder External Rotation  4/5 4-/5   Shoulder Internal Rotation  4+/5 4/5   Elbow Flexion 5/5 5/5   Elbow Extension  5/5 5/5     Special Tests:  Test Right Left   Empty Can  negative positive   Hollingsworth Justen negative positive     Palpation: Pt presents with increased tone at Biceps, supraspinatus, anterior and lateral deltoid, teres minor and teres major.      Movement Analysis: Pt presents with increased scapular winging with repeated shoulder flexion     Other: Pt presents with postural abnormalities which include: slouched posture, forward head, rounded shoulders  and increased thoracic kyphosis        Function:     THE UPPER EXTREMITY FUNCTIONAL SCALE (UEFS) - The following scores are based on patient reported assessment.      0 = extreme difficulty or unable to perform activity; 1 = quite a bit of  difficulty; 2 = moderate difficulty; 3 = a little bit of difficulty; 4 = no difficulty    1 Any of your usual work, housework, or school activities   3/4  2 Your usual hobbies, re creational or sporting activities    1/4  3 Lifting a bag of groceries to waist level      4/4  4 Lifting a bag of groceries above your head      3/4  5 Grooming your hair         4/4  6 Pushing up on your hands (eg from bathtub or chair)    3/4  7 Preparing food (eg peeling, cutting)      4/4  8 Driving          2/4  9 Vacuuming, sweeping or raking       4/4  10 Dressing          4/4  11 Doing up buttons         4/4  12 Using tools or appliances        2/4  13 Opening doors         2/4  14 Cleaning          4/4  15 Tying or lacing shoes        4/4  16 Sleeping          0/4  17 Laundering clothes (eg washing, ironing, folding)    4/4  18 Opening a jar         4/4  19 Throwing a ball         0/4  20 Carrying a small suitcase with your affected limb    3/4    Minimum Level of Detectable Change (90% Confidence): 9 points SCORE: 58/80    Patient reports 72.5% ability on the Upper Extremity Functional Scale.      TREATMENT   Time In: 1:10  Time Out: 1:59  Total Billable Time: 49 minutes    Home Exercises and Patient Education Provided    Education provided:   Patient educated on the impairments noted above and the effects of physical therapy intervention to improve overall condition and QOL.   Patient was educated on postural awareness and importance of strong core musculature in order to support the  spine.    Assessment   Richard is a 38 y.o. male referred to outpatient Physical Therapy with a medical diagnosis of L shoulder tendinitis. The patient presents with impairments which include decreased ROM, decreased strength, decreased joint mobility and postural abnormalities.  These impairments are limiting patient's ability to participate in ADL's, throw a ball, sleep comfortably. Pt prognosis is Fair due to personal factors and co-morbidities  listed below. Pt will benefit from skilled outpatient Physical Therapy to address the deficits stated above and in the chart below, provide pt/family education, and to maximize pt's level of independence.     Plan of care discussed with patient: Yes  Pt's spiritual, cultural and educational needs considered and patient is agreeable to the plan of care and goals as stated below:     Medical Necessity is demonstrated by the following  History  Co-morbidities and personal factors that may impact the plan of care Co-morbidities:   none    Personal Factors:   lifestyle     low   Examination  Body Structures and Functions, activity limitations and participation restrictions that may impact the plan of care Body Regions:   back  lower extremities    Body Systems:    ROM  strength    Participation Restrictions:   housework     Activity limitations:   Learning and applying knowledge  no deficits    General Tasks and Commands  no deficits    Communication  no deficits    Mobility  no deficits    Self care  no deficits    Domestic Life  shopping  cooking  doing house work (cleaning house, washing dishes, laundry)  assisting others    Interactions/Relationships  no deficits    Life Areas  no deficits    Community and Social Life  recreation and leisure         low   Clinical Presentation evolving clinical presentation with changing clinical characteristics moderate   Decision Making/ Complexity Score: low     Goals:  Short Term Goals: 4 weeks   1. Pt will demonstrate improved pain by reports of 6/10 worst pain on the verbal rating scale, decreased dependence on pain medication, and return to functional and recreational activities at 60% of full function.   2. Pt will demonstrate improved function as indicated by a score of 84% on the UEFS.  3. Patient will demonstrate improved AROM by 50%, when compared to that of unaffected extremity, in order to improve functional independence.  4. Pt will demonstrate improved strength by 50%  when compared to that of unaffected extremity, in order to improve functional independence.   5. Pt will demonstrate improved postural endurance with reports of holding proper posture in 15 minute increments without pain.     Long Term Goals: 8 weeks   1. Pt will demonstrate improved pain by reports of 3/10 worst pain on the verbal rating scale, decreased dependence on pain medication, and return to functional and recreational activities at greater than or equal to 80% of full function.   2. Pt will demonstrate improved function as indicated by a score of greater than or equal to 92% on the UEFS.  3. Patient will demonstrate improved AROM by 90%, when compared to that of unaffected extremity, in order to improve functional independence.  4. Pt will demonstrate improved strength by 90% when compared to that of unaffected extremity, in order to improve functional independence.   5. Pt will demonstrate improved postural endurance with reports of holding proper posture in 30 minute increments without pain.     Plan   Plan of care Certification: 4/1/2019 to 6/30/19    Outpatient Physical Therapy 2 times weekly for 8 weeks to include the following interventions: Manual Therapy, Patient Education, Self Care, Therapeutic Activites and Therapeutic Exercise.     Gemma Osullivan, PT

## 2019-04-04 ENCOUNTER — CLINICAL SUPPORT (OUTPATIENT)
Dept: REHABILITATION | Facility: HOSPITAL | Age: 39
End: 2019-04-04
Payer: OTHER GOVERNMENT

## 2019-04-04 ENCOUNTER — OFFICE VISIT (OUTPATIENT)
Dept: UROLOGY | Facility: CLINIC | Age: 39
End: 2019-04-04
Payer: OTHER GOVERNMENT

## 2019-04-04 VITALS
WEIGHT: 201.81 LBS | BODY MASS INDEX: 27.33 KG/M2 | DIASTOLIC BLOOD PRESSURE: 82 MMHG | SYSTOLIC BLOOD PRESSURE: 120 MMHG | HEIGHT: 72 IN

## 2019-04-04 DIAGNOSIS — M77.8 TENDINITIS OF LEFT SHOULDER: Primary | ICD-10-CM

## 2019-04-04 DIAGNOSIS — Z30.09 UNWANTED FERTILITY: Primary | ICD-10-CM

## 2019-04-04 LAB
BILIRUB SERPL-MCNC: NORMAL MG/DL
BLOOD URINE, POC: NORMAL
COLOR, POC UA: NORMAL
GLUCOSE UR QL STRIP: NORMAL
KETONES UR QL STRIP: NORMAL
LEUKOCYTE ESTERASE URINE, POC: NORMAL
NITRITE, POC UA: NORMAL
PH, POC UA: 6
PROTEIN, POC: NORMAL
SPECIFIC GRAVITY, POC UA: 1.02
UROBILINOGEN, POC UA: NORMAL

## 2019-04-04 PROCEDURE — 99999 PR PBB SHADOW E&M-EST. PATIENT-LVL II: ICD-10-PCS | Mod: PBBFAC,,, | Performed by: UROLOGY

## 2019-04-04 PROCEDURE — 99999 PR PBB SHADOW E&M-EST. PATIENT-LVL II: CPT | Mod: PBBFAC,,, | Performed by: UROLOGY

## 2019-04-04 PROCEDURE — 81002 URINALYSIS NONAUTO W/O SCOPE: CPT | Mod: PBBFAC | Performed by: UROLOGY

## 2019-04-04 PROCEDURE — 99024 POSTOP FOLLOW-UP VISIT: CPT | Mod: ,,, | Performed by: UROLOGY

## 2019-04-04 PROCEDURE — 99024 PR POST-OP FOLLOW-UP VISIT: ICD-10-PCS | Mod: ,,, | Performed by: UROLOGY

## 2019-04-04 PROCEDURE — 99212 OFFICE O/P EST SF 10 MIN: CPT | Mod: PBBFAC | Performed by: UROLOGY

## 2019-04-04 PROCEDURE — 97140 MANUAL THERAPY 1/> REGIONS: CPT

## 2019-04-04 PROCEDURE — 97110 THERAPEUTIC EXERCISES: CPT

## 2019-04-04 PROCEDURE — 97535 SELF CARE MNGMENT TRAINING: CPT

## 2019-04-04 NOTE — PROGRESS NOTES
Chief Complaint: Unwanted Fertility    HPI:   4/4/19: Wound healed, a little tender still but much more normal.  3/18/19: Had some discharge from the top of his scrotal incision.  Location is right where the last tie of monocryl would be at the top of the incision.  2/14/19: No pain to speak of after recently vasectomy.  Still having left testalgia and still swollen.  Left cord was explored and no obvious vas was isolated; the structure taken was a small artery.  Unclear if fertility will be achieved.  9/5/18: 37 yo man with 5 children desires no more; referred by Dr. Perez.  No abd/pelvic pain and no exac/rel factors.  No hematuria.  No urolithiasis.  No urinary bother.  No  history.  Normal sexual function.    Allergies:  Penicillins    Medications:  currently has no medications in their medication list.    Review of Systems:  General: No fever, chills, fatigability, or weight loss.  Skin: No rashes, itching, or changes in color or texture of skin.  Chest: Denies SAVAGE, cyanosis, wheezing, cough, and sputum production.  Abdomen: Appetite fine. No weight loss. Denies diarrhea, abdominal pain, hematemesis, or blood in stool.  Musculoskeletal: No joint stiffness or swelling. Denies back pain.  : As above.  All other review of systems negative.    PMH:   has no past medical history on file.    PSH:   has a past surgical history that includes Hernia repair; Vasectomy (Bilateral, 1/31/2019); and Ultrasound guidance (Left, 1/31/2019).    FamHx: family history includes Heart attacks under age 50 in his maternal grandfather; Melanoma in his brother, father, mother, and sister.    SocHx:  reports that he has been smoking vaping w/o nicotine.  He has never used smokeless tobacco. He reports that he does not drink alcohol or use drugs.      Physical Exam:  Vitals:    04/04/19 1555   BP: 120/82     General: A&Ox3, no apparent distress, no deformities  Neck: No masses, normal thyroid  Lungs: normal inspiration, no use of  accessory muscles  Heart: normal pulse, no arrhythmias  Abdomen: Soft, NT, ND, no masses, no hernias, no hepatosplenomegaly  Lymphatic: Neck and groin nodes negative  Skin: The skin is warm and dry. No jaundice.  Ext: No c/c/e.  :   4/4/19: scrotal wound fully resolved.  3/18/19: Has some drainage at the top of the incision on the scrotum, two dots draining with a small josué in between not fluctuant; like a stitch seroma  2/14/19: scrotal wound healing well, left testicle swollen still, cord swollen still  9/18: Test desc adela, no abnormalities of epididymus. Penis normal, with normal penile and scrotal skin. Meatus normal.     Labs/Studies: Urinalysis performed in clinic, summary: UA normal    Impression/Plan:   1. RTC for semen analysis as prev planned.

## 2019-04-04 NOTE — PROGRESS NOTES
Physical Therapy Daily Treatment Note     Name: Matthew Graham Blakemore  [facility] Number: [id number]    Therapy Diagnosis: No diagnosis found.  Physician: Mendel Lucio MD    Visit Date: 4/4/2019    Physician Orders: Eval and Treat  Medical Diagnosis: Tendinitis of Left Shoulder   Evaluation Date: 4/4/2019  Authorization Period Expiration: 7/24/19  Plan of Care Certification Period: 6/30/19  Visit #/Visits authorized: 2/16    Precautions: Standard    Subjective     Pt reports:   Daily Subjective: Pt reports no significant changes since initial evaluation   He N/A compliant with home exercise program.  Response to previous treatment: pt reports that he was extremely sore following initial evaluation  Functional change: no changes noted    Pain: 6/10  Location: Posterior left shoulder     Objective   Objective information below is from initial evaluation unless bolded today.    ROM    Right (degrees) Left (degrees)   Shoulder Flexion  168 168, pain with AROM    Shoulder Abduction    Pain , 139 , opain with AROM    Shoulder Extension WFL WFL    Shoulder Internal Rotation 35 Pain, WFL 62   Shoulder External Rotation 75 64      Slight scapular winging with repeated shoulder flexion      Joint Mobility    Right Left    Thoracic PA Gap Hypomobile Hypomobile   Thoracic PA [place]  Hypomobile Hypomobile   Rib Inhalation Normal Normal   Rib Exhalation  Hypomobile Hypomobile   Glenohumeral Distraction Normal Normal   Glenohumeral Inferior [place] Normal Hypomobile   Glenohumeral Anterior Glide Normal Hypomobile, pain    Glenohumeral Posterior Glide  Normal Normal      Strength    Right     Left   Shoulder Flexion 4+/5 4-/5   Shoulder Abduction 4+/5 4-/5   Shoulder Extension  5/5 4+/5   Shoulder External Rotation  4/5 4-/5   Shoulder Internal Rotation  4+/5 4/5   Elbow Flexion 5/5 5/5   Elbow Extension  5/5 5/5      Special Tests:  Test Right Left   Empty Can  negative positive   Hollingsworth Justen negative positive       Palpation: Pt presents with increased tone at Biceps, supraspinatus, anterior and lateral deltoid, teres minor and teres major.       Movement Analysis: Pt presents with increased scapular winging with repeated shoulder flexion      Other: Pt presents with postural abnormalities which include: slouched posture, forward head, rounded shoulders  and increased thoracic kyphosis       Treatment:     Richard received therapeutic exercises to develop strength, ROM and posture for 18 minutes including:  Scapular Retractions: 30#, 3 x 10   SL Shoulder ER: 3 x 10, L only   Supine shoulder protraction: 3# DB, 3 x 10, tightness at post shoulder     Richard received the following manual therapy techniques: Soft tissue Mobilization were applied to the: shoulder for 35 minutes, including:  STM of supraspinatus, biceps, teres minor, teres major, and deltoid     Home Exercises Provided and Patient Education Provided     Education provided:   Patient educated on biomechanical justification for therapeutic exercise and importance of compliance with HEP in order to improve overall imparirments and QOL     Written Home Exercises Provided: yes.  Exercises were reviewed and Richard was able to demonstrate them prior to the end of the session.  Richard demonstrated good  understanding of the education provided.     See EMR under Patient Instructions for exercises provided 4/4/2019.    Assessment     Patient tolerated manual therapy well with reports of decreased tension and increased mobility following. Patient tolerated therapeutic exercise well with reports of returning tightness at posterior shoulder following shoulder ER and serratus punches. Patient required continued cueing to avoid upper trapezius activation with scapular retractions.     Richard is progressing well towards his goals.   Pt prognosis is Good.     Pt will continue to benefit from skilled outpatient physical therapy to address the deficits listed in the problem  list box on initial evaluation, provide pt/family education and to maximize pt's level of independence in the home and community environment.     Pt's spiritual, cultural and educational needs considered and pt agreeable to plan of care and goals.      Goals:   Short Term Goals: 4 weeks   1. Pt will demonstrate improved pain by reports of 6/10 worst pain on the verbal rating scale, decreased dependence on pain medication, and return to functional and recreational activities at 60% of full function.   2. Pt will demonstrate improved function as indicated by a score of 84% on the UEFS.  3. Patient will demonstrate improved AROM by 50%, when compared to that of unaffected extremity, in order to improve functional independence.  4. Pt will demonstrate improved strength by 50% when compared to that of unaffected extremity, in order to improve functional independence.   5. Pt will demonstrate improved postural endurance with reports of holding proper posture in 15 minute increments without pain.      Long Term Goals: 8 weeks   1. Pt will demonstrate improved pain by reports of 3/10 worst pain on the verbal rating scale, decreased dependence on pain medication, and return to functional and recreational activities at greater than or equal to 80% of full function.   2. Pt will demonstrate improved function as indicated by a score of greater than or equal to 92% on the UEFS.  3. Patient will demonstrate improved AROM by 90%, when compared to that of unaffected extremity, in order to improve functional independence.  4. Pt will demonstrate improved strength by 90% when compared to that of unaffected extremity, in order to improve functional independence.   5. Pt will demonstrate improved postural endurance with reports of holding proper posture in 30 minute increments without pain.         Plan   Continue Plan of Care (POC) and progress per patient tolerance.        Gemma Osullivan, PT

## 2019-04-08 ENCOUNTER — CLINICAL SUPPORT (OUTPATIENT)
Dept: REHABILITATION | Facility: HOSPITAL | Age: 39
End: 2019-04-08
Payer: OTHER GOVERNMENT

## 2019-04-08 DIAGNOSIS — M77.8 TENDINITIS OF LEFT SHOULDER: Primary | ICD-10-CM

## 2019-04-08 PROCEDURE — 97140 MANUAL THERAPY 1/> REGIONS: CPT

## 2019-04-08 PROCEDURE — 97110 THERAPEUTIC EXERCISES: CPT

## 2019-04-08 PROCEDURE — 97535 SELF CARE MNGMENT TRAINING: CPT

## 2019-04-12 ENCOUNTER — CLINICAL SUPPORT (OUTPATIENT)
Dept: REHABILITATION | Facility: HOSPITAL | Age: 39
End: 2019-04-12
Payer: OTHER GOVERNMENT

## 2019-04-12 DIAGNOSIS — M77.8 TENDINITIS OF LEFT SHOULDER: Primary | ICD-10-CM

## 2019-04-12 PROCEDURE — 97110 THERAPEUTIC EXERCISES: CPT

## 2019-04-12 PROCEDURE — 97140 MANUAL THERAPY 1/> REGIONS: CPT

## 2019-04-12 NOTE — PROGRESS NOTES
Physical Therapy Daily Treatment Note     [id number]    Therapy Diagnosis:   Encounter Diagnosis   Name Primary?    Tendinitis of left shoulder Yes     Physician: Mendel Lucio MD    Visit Date: 4/12/2019    Physician Orders: Eval and Treat  Medical Diagnosis: Tendinitis of Left Shoulder   Evaluation Date: 4/4/2019  Authorization Period Expiration: 7/24/19  Plan of Care Certification Period: 6/30/19  Visit #/Visits authorized: 3/16    Precautions: Standard    Subjective     Pt reports: he is having a lot of pain in posterior shoulder since this morning when he reached over to the side of his bed to turn his alarm clock off and heard a pop. Pt reports no pain in the AC joint area this morning   He was compliant with home exercise program.  Response to previous treatment: pt reports no soreness following last PT session, states he feels that his shoulder is not getting better overall   Functional change: no changes noted    Pain: Pre-treatment: 6/10  Post-treatment: 5/10  Location: Posterior left shoulder       Treatment:     Richard received therapeutic exercises to develop strength, ROM and posture for 15 minutes including:  Scapular Retractions: 20#, 3 x 10   1/2 Foam Roll: 1 min pec stretch, 1 min swimmers, 1 min W's   Shoulder IR: 30#, 3 x 12   Shoulder ER: 10#, 3 x 10   Doorway Pec Stretch: attempted, pain       Richard received the following manual therapy techniques: Soft tissue Mobilization were applied to the: shoulder for 45 minutes, including:  STM of  teres minor, teres major, pectoralis minor and pectoralis major    Home Exercises Provided and Patient Education Provided     Education provided:   Patient educated on biomechanical justification for therapeutic exercise and importance of compliance with HEP in order to improve overall imparirments and QOL     Written Home Exercises Provided: yes.  Exercises were reviewed and Richard was able to  demonstrate them prior to the end of the session.  Richard demonstrated good  understanding of the education provided.     See EMR under Patient Instructions for exercises provided 4/12/19.     Assessment     Patient tolerated manual therapy well with reports of decreased tension and increased mobility following. Patient tolerated therapeutic exercise well with reports of returning tightness at posterior shoulder following shoulder ER. Pt reports grinding sensation and pain in AC joint following plank push up plus. Patient required continued cueing to avoid upper trapezius activation with scapular retractions.     Richard is progressing well towards his goals.   Pt prognosis is Good.     Pt will continue to benefit from skilled outpatient physical therapy to address the deficits listed in the problem list box on initial evaluation, provide pt/family education and to maximize pt's level of independence in the home and community environment.     Pt's spiritual, cultural and educational needs considered and pt agreeable to plan of care and goals.      Goals:   Short Term Goals: 4 weeks   1. Pt will demonstrate improved pain by reports of 6/10 worst pain on the verbal rating scale, decreased dependence on pain medication, and return to functional and recreational activities at 60% of full function.   2. Pt will demonstrate improved function as indicated by a score of 84% on the UEFS.  3. Patient will demonstrate improved AROM by 50%, when compared to that of unaffected extremity, in order to improve functional independence.  4. Pt will demonstrate improved strength by 50% when compared to that of unaffected extremity, in order to improve functional independence.   5. Pt will demonstrate improved postural endurance with reports of holding proper posture in 15 minute increments without pain.      Long Term Goals: 8 weeks   1. Pt will demonstrate improved pain by reports of 3/10 worst pain on the verbal rating scale,  decreased dependence on pain medication, and return to functional and recreational activities at greater than or equal to 80% of full function.   2. Pt will demonstrate improved function as indicated by a score of greater than or equal to 92% on the UEFS.  3. Patient will demonstrate improved AROM by 90%, when compared to that of unaffected extremity, in order to improve functional independence.  4. Pt will demonstrate improved strength by 90% when compared to that of unaffected extremity, in order to improve functional independence.   5. Pt will demonstrate improved postural endurance with reports of holding proper posture in 30 minute increments without pain.         Plan   Continue Plan of Care (POC) and progress per patient tolerance.

## 2019-04-15 ENCOUNTER — CLINICAL SUPPORT (OUTPATIENT)
Dept: REHABILITATION | Facility: HOSPITAL | Age: 39
End: 2019-04-15
Payer: OTHER GOVERNMENT

## 2019-04-15 DIAGNOSIS — M77.8 TENDINITIS OF LEFT SHOULDER: Primary | ICD-10-CM

## 2019-04-15 PROCEDURE — 97140 MANUAL THERAPY 1/> REGIONS: CPT

## 2019-04-15 PROCEDURE — 97110 THERAPEUTIC EXERCISES: CPT

## 2019-04-20 NOTE — PROGRESS NOTES
Physical Therapy Daily Treatment Note     Name: Richard Vuongmore  Clinic Number: [id number]    Therapy Diagnosis:   Encounter Diagnosis   Name Primary?    Tendinitis of left shoulder Yes     Physician: Mendel Lucio MD    Visit Date: 4/15/2019    Physician Orders: Eval and Treat  Medical Diagnosis: Tendinitis of Left Shoulder   Evaluation Date: 4/4/2019  Authorization Period Expiration: 7/24/19  Plan of Care Certification Period: 6/30/19  Visit #/Visits authorized: 5/16    Precautions: Standard    Subjective     Pt reports: he noticed decreased pain in posterior L shoulder after manual therapy last session. However, his pain is very high today due to a performance test that he had to do for work today. Pt states that he noticed pain in the posterior L shoulder which radiated down to the medial elbow during both running and push ups. Pt states he is very sore and fatigued from this testing today.    He was compliant with home exercise program.  Response to previous treatment: pt reports soreness and decreased tension in posterior left shoulder following manual therapy intervention last session  Functional change: pt tolerated therapeutic exercise progression.     Pain: 6/10  Location: Posterior left shoulder       Treatment:     Richard received therapeutic exercises to develop strength, ROM and posture for 35 minutes including:  Scapular Retractions: 20#, 3 x 10   1/2 Foam Roll: Series 6, 1 min each   Shoulder IR: 30#, 3 x 12   B Shoulder ER: 10#, 3 x 10   Shoulder Flexion with Shrug: 2 x 10   Lat Pull Down: 3 plates, 3 x 10   UBE: attempted but discontinued due to pain and grinding sensation at L AC joint.       Richard received the following manual therapy techniques: Soft tissue Mobilization were applied to the: shoulder for 25 minutes, including:  STM of  teres minor, teres major, pectoralis minor and pectoralis major  Contract Relax stretch of B pectoralis minor   Active release of L teres minor  and teres major     Home Exercises Provided and Patient Education Provided     Education provided:   Patient educated on biomechanical justification for therapeutic exercise and importance of compliance with HEP in order to improve overall imparirments and QOL     Written Home Exercises Provided: yes.  Exercises were reviewed and Richard was able to demonstrate them prior to the end of the session.  Richard demonstrated good  understanding of the education provided.     See EMR under Patient Instructions for exercises provided prior visit.     Assessment     Patient tolerated manual therapy well with reports of decreased tension and increased mobility following intervention. Patient was unable to tolerate UBE exercise due to increased pain and grinding sensation a L AC joint. Patient required continued cueing to avoid upper trapezius activation with scapular retractions.     Richard is progressing well towards his goals.   Pt prognosis is Good.     Pt will continue to benefit from skilled outpatient physical therapy to address the deficits listed in the problem list box on initial evaluation, provide pt/family education and to maximize pt's level of independence in the home and community environment.     Pt's spiritual, cultural and educational needs considered and pt agreeable to plan of care and goals.      Goals:   Short Term Goals: 4 weeks   1. Pt will demonstrate improved pain by reports of 6/10 worst pain on the verbal rating scale, decreased dependence on pain medication, and return to functional and recreational activities at 60% of full function.   2. Pt will demonstrate improved function as indicated by a score of 84% on the UEFS.  3. Patient will demonstrate improved AROM by 50%, when compared to that of unaffected extremity, in order to improve functional independence.  4. Pt will demonstrate improved strength by 50% when compared to that of unaffected extremity, in order to improve functional  independence.   5. Pt will demonstrate improved postural endurance with reports of holding proper posture in 15 minute increments without pain.      Long Term Goals: 8 weeks   1. Pt will demonstrate improved pain by reports of 3/10 worst pain on the verbal rating scale, decreased dependence on pain medication, and return to functional and recreational activities at greater than or equal to 80% of full function.   2. Pt will demonstrate improved function as indicated by a score of greater than or equal to 92% on the UEFS.  3. Patient will demonstrate improved AROM by 90%, when compared to that of unaffected extremity, in order to improve functional independence.  4. Pt will demonstrate improved strength by 90% when compared to that of unaffected extremity, in order to improve functional independence.   5. Pt will demonstrate improved postural endurance with reports of holding proper posture in 30 minute increments without pain.         Plan   Continue Plan of Care (POC) and progress per patient tolerance.    Gemma Osullivan PT, DPT

## 2019-04-22 ENCOUNTER — CLINICAL SUPPORT (OUTPATIENT)
Dept: REHABILITATION | Facility: HOSPITAL | Age: 39
End: 2019-04-22
Payer: OTHER GOVERNMENT

## 2019-04-22 DIAGNOSIS — M77.8 TENDINITIS OF LEFT SHOULDER: Primary | ICD-10-CM

## 2019-04-22 PROCEDURE — 97140 MANUAL THERAPY 1/> REGIONS: CPT

## 2019-04-22 PROCEDURE — 97535 SELF CARE MNGMENT TRAINING: CPT

## 2019-04-22 PROCEDURE — 97110 THERAPEUTIC EXERCISES: CPT

## 2019-04-22 NOTE — PROGRESS NOTES
Physical Therapy Daily Treatment Note     Name: Matthew Graham Blakemore  Clinic Number: [id number]    Therapy Diagnosis:   Encounter Diagnosis   Name Primary?    Tendinitis of left shoulder Yes     Physician: Mendel Lucio MD    Visit Date: 4/22/2019    Physician Orders: Eval and Treat  Medical Diagnosis: Tendinitis of Left Shoulder   Evaluation Date: 4/4/2019  Authorization Period Expiration: 7/24/19  Plan of Care Certification Period: 6/30/19  Visit #/Visits authorized: 5/16    Precautions: Standard    Subjective     Pt reports: he is starting to notice decreased pain in L shoulder. States that the only time he really notices it bothering him is when sitting with his hand resting on top of his head.    He was compliant with home exercise program.  Response to previous treatment: decreased tension in posterior L shoulder   Functional change: pt tolerated therapeutic exercise progression with fatigue     Pain: 3/10  Location: Posterior left shoulder       Treatment:     Richard received therapeutic exercises to develop strength, ROM and posture for 40 minutes including:  Scapular Retractions: 40#, 3 x 10   1/2 Foam Roll: Series 6, 1 min each   Shoulder IR: 30#, 3 x 115  B Shoulder ER: Green, 3 x 10   Bowflex: 3 x 1' IR/ER in neutral; 3 x 1' in 90 deg abd  Qadruped push up plus: 3 x 10       Richard received the following manual therapy techniques: Soft tissue Mobilization were applied to the: shoulder for 12 minutes, including:  STM of  teres minor, teres major  Active release of L teres minor and teres major     Home Exercises Provided and Patient Education Provided     Education provided:   Patient educated on biomechanical justification for therapeutic exercise and importance of compliance with HEP in order to improve overall imparirments and QOL     Written Home Exercises Provided: yes.  Exercises were reviewed and Richard was able to demonstrate them prior to the end of the session.  Richard  demonstrated good  understanding of the education provided.     See EMR under Patient Instructions for exercises provided prior visit.     Assessment     Patient tolerated manual therapy well with reports of decreased tension and increased mobility following intervention. Patient was unable to tolerate UBE exercise due to increased pain and grinding sensation a L AC joint. Patient required continued cueing to avoid upper trapezius activation with scapular retractions.     Richard is progressing well towards his goals.   Pt prognosis is Good.     Pt will continue to benefit from skilled outpatient physical therapy to address the deficits listed in the problem list box on initial evaluation, provide pt/family education and to maximize pt's level of independence in the home and community environment.     Pt's spiritual, cultural and educational needs considered and pt agreeable to plan of care and goals.      Goals:   Short Term Goals: 4 weeks   1. Pt will demonstrate improved pain by reports of 6/10 worst pain on the verbal rating scale, decreased dependence on pain medication, and return to functional and recreational activities at 60% of full function.   2. Pt will demonstrate improved function as indicated by a score of 84% on the UEFS.  3. Patient will demonstrate improved AROM by 50%, when compared to that of unaffected extremity, in order to improve functional independence.  4. Pt will demonstrate improved strength by 50% when compared to that of unaffected extremity, in order to improve functional independence.   5. Pt will demonstrate improved postural endurance with reports of holding proper posture in 15 minute increments without pain.      Long Term Goals: 8 weeks   1. Pt will demonstrate improved pain by reports of 3/10 worst pain on the verbal rating scale, decreased dependence on pain medication, and return to functional and recreational activities at greater than or equal to 80% of full function.    2. Pt will demonstrate improved function as indicated by a score of greater than or equal to 92% on the UEFS.  3. Patient will demonstrate improved AROM by 90%, when compared to that of unaffected extremity, in order to improve functional independence.  4. Pt will demonstrate improved strength by 90% when compared to that of unaffected extremity, in order to improve functional independence.   5. Pt will demonstrate improved postural endurance with reports of holding proper posture in 30 minute increments without pain.         Plan   Continue Plan of Care (POC) and progress per patient tolerance.    Gemma Osullivan PT, DPT

## 2019-04-24 ENCOUNTER — CLINICAL SUPPORT (OUTPATIENT)
Dept: REHABILITATION | Facility: HOSPITAL | Age: 39
End: 2019-04-24
Payer: OTHER GOVERNMENT

## 2019-04-24 DIAGNOSIS — M77.8 TENDINITIS OF LEFT SHOULDER: Primary | ICD-10-CM

## 2019-04-24 PROCEDURE — 97140 MANUAL THERAPY 1/> REGIONS: CPT

## 2019-04-24 PROCEDURE — 97110 THERAPEUTIC EXERCISES: CPT

## 2019-04-24 NOTE — PLAN OF CARE
Physical Therapy Progress Note      Name: Matthew Graham Blakemore  Clinic Number: [id number]     Therapy Diagnosis:        Encounter Diagnosis   Name Primary?    Tendinitis of left shoulder Yes      Physician: Mendel Lucio MD     Visit Date: 4/24/2019     Physician Orders: Eval and Treat  Medical Diagnosis: Tendinitis of Left Shoulder   Evaluation Date: 4/4/2019  Authorization Period Expiration: 7/24/19  Plan of Care Certification Period: 6/30/19  Visit #/Visits authorized: 7/16     Precautions: Standard     Subjective      Pt reports: Pt states that he has not had any pain in L shoulder since previous appointment. He was able to jump around and do flips on the trampoline yesterday with no pain.    He was compliant with home exercise program.  Response to previous treatment: decreased tension in posterior L shoulder   Functional change: pt tolerated therapeutic exercise progression with fatigue      Pain: 0/10  Location: Posterior left shoulder         Objective      Sensation: Sensation is intact to light touch     ROM    Right (degrees)  Eval Left (degrees)  Eval Right  4/24/19 Left   4/24/19   Shoulder Flexion  168 168, pain with AROM  178 176  Pain in anterior shoulder at supraspinatus   Shoulder Abduction    Pain , 139 , pain with AROM  175 175   Shoulder Extension WFL WFL  WFL WFL   Shoulder Internal Rotation 35 Pain, WFL 62 65 63   Shoulder External Rotation 75 64 75 73        Joint Mobility    Right  Eval Left   Eval Right   4/24/19 Left   4/24/19   Thoracic PA Gap Hypomobile Hypomobile Hypomobile Hypomobile   Thoracic PA [place]  Hypomobile Hypomobile Hypomobile Hypomobile   Rib Inhalation Normal Normal Normal Normal   Rib Exhalation  Hypomobile Hypomobile Normal Normal   Glenohumeral Distraction Normal Normal Normal Normal   Glenohumeral Inferior Stowe Normal Hypomobile Normal Normal   Glenohumeral Anterior Glide Normal Hypomobile, pain  Normal Normal   Glenohumeral Posterior Glide  Normal Normal  Normal Normal      Strength    Right    Eval Left  Eval Right   4/24/19 Left  4/24/19   Shoulder Flexion 4+/5 4-/5 4+/5 4+/5  pain   Shoulder Abduction 4+/5 4-/5 4+/5 4+/5   Shoulder Extension  5/5 4+/5 5/5 5/5   Shoulder External Rotation  4/5 4-/5 4+/5 4+/5   Shoulder Internal Rotation  4+/5 4/5 4+/5 4+/5   Elbow Flexion 5/5 5/5 5/5 5/5   Elbow Extension  5/5 5/5 5/5 5/5      Special Tests:  Test Right  Eval Left  Eval Left  4/24/19   Empty Can  negative positive positive   [place] Justen negative positive positive      Palpation: Pt presents with increased tone at Biceps, supraspinatus, anterior and lateral deltoid, teres minor and teres major.       Movement Analysis: Pt presents with increased scapular winging with repeated shoulder flexion      Other: Pt presents with postural abnormalities which include: slouched posture, forward head, rounded shoulders  and increased thoracic kyphosis                                         Function:             THE UPPER EXTREMITY FUNCTIONAL SCALE (UEFS) - The following scores are based on patient reported assessment.       0 = extreme difficulty or unable to perform activity; 1 = quite a bit of difficulty; 2 = moderate difficulty; 3 = a little bit of difficulty; 4 = no difficulty             Eval  4/42/19  1 Any of your usual work, housework, or school activities                             3/4  3/4  2 Your usual hobbies, re creational or sporting activities                              1/4  2/4  3 Lifting a bag of groceries to waist level                                                       4/4 4/4  4 Lifting a bag of groceries above your head                                                 3/4  4/4  5 Grooming your hair                                                                                      4/4  4/4  6 Pushing up on your hands (eg from bathtub or chair)                                3/4  4/4  7 Preparing food (eg peeling, cutting)                                                             4/4 2/4  8 Driving                                                                                                          2/4  3/4  9 Vacuuming, sweeping or raking                                                                  4/4 4/4  10 Dressing                                                                                                     4/4 4/4  11 Doing up buttons                                                                                        4/4 4/4  12 Using tools or appliances                                                                          2/4 4/4  13 Opening doors                                                                                           2/4 4/4  14 Cleaning                                                                                                     4/4 4/4  15 Tying or lacing shoes                                                                                 4/4 4/4  16 Sleeping                                                                                                     0/4 1/4  17 Laundering clothes (eg washing, ironing, folding)                                    4/4 4/4  18 Opening a jar                                                                                             4/4 4/4  19 Throwing a ball                                                                                          0/4 1/4  20 Carrying a small suitcase with your affected limb                                     3/4  4/4     Minimum Level of Detectable Change (90% Confidence): 9 points SCORE: 68/80     Patient reports 72.5% ability on the Upper Extremity Functional Scale.       Treatment:      Richard received therapeutic exercises to develop strength, ROM and posture for 35 minutes including:  B D1 Ext: 30# 2x15; 40# 2 x 15  B D2 Flexion: 10#, 3 x 10   1/2 Foam Roll: Series 6, 1 min each   Bowflex: 2 x 1' IR/ER in neutral;  1' in 90 deg abd, 1' scaption punches   Qadruped push up plus: 3 x 10   Side Plank: 20 sec, D/C due to pain at supraspinatus         Richard received the following manual therapy techniques: Soft tissue Mobilization were applied to the: shoulder for 25 minutes, including:  STM of L teres minor, teres major, supraspinatus and anterior deltoid  Active release of L teres minor and teres major   Contract relax stretch of shoulder external rotators      Home Exercises Provided and Patient Education Provided      Education provided:   Patient educated on biomechanical justification for therapeutic exercise and importance of compliance with HEP in order to improve overall imparirments and QOL   Patient educated on use of a tennis ball for self soft tissue mobilization at home.      Written Home Exercises Provided: yes.  Exercises were reviewed and Richard was able to demonstrate them prior to the end of the session.  Richard demonstrated good  understanding of the education provided.      See EMR under Patient Instructions for exercises provided prior visit.      Assessment      Patient tolerated manual therapy well with reports of decreased tension and increased mobility following intervention. Pt was unable to tolerate side plank exercise due to anterior shoulder pain approximately at location of supraspinatus muscle. Pt tolerated all other therapeutic exercise with fatigue but no increase in pain. Pt was able to maintain proper exercise form with minimal cueing.    Richard is progressing well towards his goals.   Pt prognosis is Good.      Pt will continue to benefit from skilled outpatient physical therapy to address the deficits listed in the problem list box on initial evaluation, provide pt/family education and to maximize pt's level of independence in the home and community environment.      Pt's spiritual, cultural and educational needs considered and pt agreeable to plan of care and goals.        Goals:   Short  Term Goals: 8 weeks   1. Pt will demonstrate improved pain by reports of 6/10 worst pain on the verbal rating scale, decreased dependence on pain medication, and return to functional and recreational activities at 60% of full function. - Met  2. Pt will demonstrate improved function as indicated by a score of 84% on the UEFS.- Met  3. Patient will demonstrate improved AROM by 50%, when compared to that of unaffected extremity, in order to improve functional independence.-Met  4. Pt will demonstrate improved strength by 50% when compared to that of unaffected extremity, in order to improve functional independence. -Met  5. Pt will demonstrate improved postural endurance with reports of holding proper posture in 15 minute increments without pain. - Progressing Towards      Long Term Goals: 16 weeks   1. Pt will demonstrate improved pain by reports of 3/10 worst pain on the verbal rating scale, decreased dependence on pain medication, and return to functional and recreational activities at greater than or equal to 80% of full function. - Met   2. Pt will demonstrate improved function as indicated by a score of greater than or equal to 92% on the UEFS. - Progressing Towards   3. Patient will demonstrate improved AROM by 90%, when compared to that of unaffected extremity, in order to improve functional independence.- Met   4. Pt will demonstrate improved strength by 90% when compared to that of unaffected extremity, in order to improve functional independence. - Met   5. Pt will demonstrate improved postural endurance with reports of holding proper posture in 30 minute increments without pain. - Progressing Towards            Plan   Pt will be placed on hold for 7 weeks due to going out of town for company trip; will be re-evaluated upon return.      Gemma Osullivan PT, DPT

## 2019-05-10 ENCOUNTER — PATIENT MESSAGE (OUTPATIENT)
Dept: UROLOGY | Facility: CLINIC | Age: 39
End: 2019-05-10

## 2019-06-06 ENCOUNTER — PATIENT MESSAGE (OUTPATIENT)
Dept: UROLOGY | Facility: CLINIC | Age: 39
End: 2019-06-06

## 2019-06-10 ENCOUNTER — CLINICAL SUPPORT (OUTPATIENT)
Dept: UROLOGY | Facility: CLINIC | Age: 39
End: 2019-06-10
Payer: OTHER GOVERNMENT

## 2019-06-10 VITALS — HEIGHT: 72 IN | WEIGHT: 201.94 LBS | BODY MASS INDEX: 27.35 KG/M2

## 2019-06-10 DIAGNOSIS — Z31.0: Primary | ICD-10-CM

## 2019-06-10 PROCEDURE — 99212 OFFICE O/P EST SF 10 MIN: CPT | Mod: PBBFAC

## 2019-06-10 PROCEDURE — 99999 PR PBB SHADOW E&M-EST. PATIENT-LVL II: CPT | Mod: PBBFAC,,,

## 2019-06-10 PROCEDURE — 99999 PR PBB SHADOW E&M-EST. PATIENT-LVL II: ICD-10-PCS | Mod: PBBFAC,,,

## 2019-07-10 ENCOUNTER — CLINICAL SUPPORT (OUTPATIENT)
Dept: UROLOGY | Facility: CLINIC | Age: 39
End: 2019-07-10
Payer: OTHER GOVERNMENT

## 2019-07-10 DIAGNOSIS — Z98.52 STATUS POST VASECTOMY: Primary | ICD-10-CM

## 2019-07-10 PROCEDURE — 99999 PR PBB SHADOW E&M-EST. PATIENT-LVL I: ICD-10-PCS | Mod: PBBFAC,,,

## 2019-07-10 PROCEDURE — 99211 OFF/OP EST MAY X REQ PHY/QHP: CPT | Mod: PBBFAC

## 2019-07-10 PROCEDURE — 99999 PR PBB SHADOW E&M-EST. PATIENT-LVL I: CPT | Mod: PBBFAC,,,

## 2019-07-10 NOTE — PROGRESS NOTES
..Azzospermia per MD. Per Dr. Flowers scheduled pt an appt for semen analysis for confirmation of this. Pt agreed. Lab schedule for 7/15.

## 2019-07-15 ENCOUNTER — TELEPHONE (OUTPATIENT)
Dept: UROLOGY | Facility: CLINIC | Age: 39
End: 2019-07-15

## 2019-07-15 DIAGNOSIS — Z98.52 STATUS POST VASECTOMY: Primary | ICD-10-CM

## 2019-07-15 NOTE — TELEPHONE ENCOUNTER
Received call from the lab at [facility].  Stated pt dropped off semen sample but it was not enough to perform the test.  Pt was contacted and instructed to stop by the [facility] and  supplies to repeat test.  Verbalized understanding.

## 2019-08-09 ENCOUNTER — OFFICE VISIT (OUTPATIENT)
Dept: INTERNAL MEDICINE | Facility: CLINIC | Age: 39
End: 2019-08-09
Payer: OTHER GOVERNMENT

## 2019-08-09 VITALS
WEIGHT: 214.31 LBS | SYSTOLIC BLOOD PRESSURE: 120 MMHG | BODY MASS INDEX: 27.5 KG/M2 | DIASTOLIC BLOOD PRESSURE: 80 MMHG | TEMPERATURE: 98 F | HEART RATE: 84 BPM | HEIGHT: 74 IN

## 2019-08-09 DIAGNOSIS — M19.019 ARTHROPATHY OF SHOULDER REGION: ICD-10-CM

## 2019-08-09 DIAGNOSIS — G89.29 CHRONIC LEFT SHOULDER PAIN: Primary | ICD-10-CM

## 2019-08-09 DIAGNOSIS — R45.4 IRRITABLE: ICD-10-CM

## 2019-08-09 DIAGNOSIS — M25.512 CHRONIC LEFT SHOULDER PAIN: Primary | ICD-10-CM

## 2019-08-09 DIAGNOSIS — F41.9 ANXIETY: ICD-10-CM

## 2019-08-09 PROCEDURE — 99214 OFFICE O/P EST MOD 30 MIN: CPT | Mod: S$PBB,,, | Performed by: PHYSICIAN ASSISTANT

## 2019-08-09 PROCEDURE — 99213 OFFICE O/P EST LOW 20 MIN: CPT | Mod: PBBFAC,PO | Performed by: PHYSICIAN ASSISTANT

## 2019-08-09 PROCEDURE — 99999 PR PBB SHADOW E&M-EST. PATIENT-LVL III: ICD-10-PCS | Mod: PBBFAC,,, | Performed by: PHYSICIAN ASSISTANT

## 2019-08-09 PROCEDURE — 99214 PR OFFICE/OUTPT VISIT, EST, LEVL IV, 30-39 MIN: ICD-10-PCS | Mod: S$PBB,,, | Performed by: PHYSICIAN ASSISTANT

## 2019-08-09 PROCEDURE — 99999 PR PBB SHADOW E&M-EST. PATIENT-LVL III: CPT | Mod: PBBFAC,,, | Performed by: PHYSICIAN ASSISTANT

## 2019-08-09 RX ORDER — ESCITALOPRAM OXALATE 5 MG/1
5 TABLET ORAL DAILY
Qty: 30 TABLET | Refills: 2 | Status: SHIPPED | OUTPATIENT
Start: 2019-08-09 | End: 2019-10-21 | Stop reason: SDUPTHER

## 2019-08-09 NOTE — PROGRESS NOTES
"Subjective:       Patient ID: Matthew Graham Blakemore is a 38 y.o. male.    Chief Complaint: Shoulder Pain (lt)    HPI  Patient comes in today for recurring shoulder pain     Is in P.T .but not feeling much better.   Has had issues with left shoulder, pain and weakness.     Saw ortho, had steroid shot for temp relief.     Also, wanting to discuss his anxiety and irritability   Noticing it more and it is affecting his home life. He seems to be triggered easily     NO SI/HI       Health Maintenance Due   Topic Date Due    TETANUS VACCINE  08/17/1997    Pneumococcal Vaccine (Medium Risk) (1 of 1 - PPSV23) 08/17/1998       History reviewed. No pertinent past medical history.    Current Outpatient Medications   Medication Sig Dispense Refill    escitalopram oxalate (LEXAPRO) 5 MG Tab Take 1 tablet (5 mg total) by mouth once daily. 30 tablet 2     No current facility-administered medications for this visit.        Review of Systems      See HPI  Objective:   /80   Pulse 84   Temp 98.2 °F (36.8 °C) (Tympanic)   Ht 6' 2" (1.88 m)   Wt 97.2 kg (214 lb 4.6 oz)   BMI 27.51 kg/m²      Physical Exam   Constitutional: He appears well-developed and well-nourished. No distress.   HENT:   Head: Normocephalic and atraumatic.   Right Ear: External ear normal.   Left Ear: External ear normal.   Mouth/Throat: Oropharynx is clear and moist.   Cardiovascular: Normal rate.   Pulmonary/Chest: Effort normal.   Musculoskeletal:        Left shoulder: He exhibits decreased range of motion, tenderness and decreased strength. He exhibits no effusion and no crepitus.        Right knee: He exhibits normal range of motion, no swelling, no effusion and normal patellar mobility. No tenderness found.        Left knee: He exhibits normal range of motion, no swelling, no effusion and normal patellar mobility. No tenderness found.   Bilateral crepitus with bending      Psychiatric: He has a normal mood and affect. His behavior is normal. "   Nursing note and vitals reviewed.        Lab Results   Component Value Date    WBC 4.75 01/25/2019    HGB 15.4 01/25/2019    HCT 48.2 01/25/2019     01/25/2019    CHOL 154 06/12/2018    TRIG 102 06/12/2018    HDL 28 (L) 06/12/2018     01/25/2019    K 4.0 01/25/2019     01/25/2019    CREATININE 0.9 01/25/2019    BUN 12 01/25/2019    CO2 30 (H) 01/25/2019       Assessment:       1. Chronic left shoulder pain    2. Arthropathy of shoulder region    3. Anxiety    4. Irritable        Plan:   Chronic left shoulder pain  -     MRI Shoulder Without Contrast Left; Future; Expected date: 08/09/2019    Arthropathy of shoulder region  -     MRI Shoulder Without Contrast Left; Future; Expected date: 08/09/2019  MRI then follow up with ortho   Anxiety  CBT+ SSRI  Irritable    Other orders  -     escitalopram oxalate (LEXAPRO) 5 MG Tab; Take 1 tablet (5 mg total) by mouth once daily.  Dispense: 30 tablet; Refill: 2      Follow up 4 weeks

## 2019-08-13 ENCOUNTER — HOSPITAL ENCOUNTER (OUTPATIENT)
Dept: RADIOLOGY | Facility: HOSPITAL | Age: 39
Discharge: HOME | End: 2019-08-13
Attending: PHYSICIAN ASSISTANT
Payer: OTHER GOVERNMENT

## 2019-08-13 ENCOUNTER — DOCUMENTATION ONLY (OUTPATIENT)
Dept: REHABILITATION | Facility: HOSPITAL | Age: 39
End: 2019-08-13

## 2019-08-13 DIAGNOSIS — M19.019 ARTHROPATHY OF SHOULDER REGION: ICD-10-CM

## 2019-08-13 DIAGNOSIS — M25.512 CHRONIC LEFT SHOULDER PAIN: ICD-10-CM

## 2019-08-13 DIAGNOSIS — G89.29 CHRONIC LEFT SHOULDER PAIN: ICD-10-CM

## 2019-08-13 PROCEDURE — 73221 MRI JOINT UPR EXTREM W/O DYE: CPT | Mod: TC,PO,LT

## 2019-08-13 PROCEDURE — 73221 MRI SHOULDER WITHOUT CONTRAST LEFT: ICD-10-PCS | Mod: 26,LT,, | Performed by: RADIOLOGY

## 2019-08-13 PROCEDURE — 73221 MRI JOINT UPR EXTREM W/O DYE: CPT | Mod: 26,LT,, | Performed by: RADIOLOGY

## 2019-08-13 NOTE — PROGRESS NOTES
Outpatient Therapy Discharge Summary     [id number]    Therapy Diagnosis:        Encounter Diagnosis   Name Primary?    Tendinitis of left shoulder Yes      Physician: Mendel Lucio MD     Physician Orders: PT Eval and Treat   Medical Diagnosis from Referral: L shoulder tendinitis   Evaluation Date: 4/1/2019      Date of Last visit: 8/13/2019  Total Visits Received: 7  Cancelled Visits: 2  No Show Visits: 0    Assessment    Goals:   Goals not met due to patient only attending 7 appointments and goal status not reassessed.     Discharge reason: Patient has not attended therapy since 4/24/19    Plan   This patient is discharged from Physical Therapy    Gemma Osullivan, PT, DPT

## 2019-08-16 ENCOUNTER — TELEPHONE (OUTPATIENT)
Dept: INTERNAL MEDICINE | Facility: CLINIC | Age: 39
End: 2019-08-16

## 2019-08-16 DIAGNOSIS — M19.019 ARTHROPATHY OF SHOULDER REGION: Primary | ICD-10-CM

## 2019-08-16 NOTE — TELEPHONE ENCOUNTER
----- Message from Mickey Xavier LPN sent at 8/16/2019 10:47 AM CDT -----  Informed pt of MRI results and recommendations. Per pt he would like to go back to [facility]

## 2019-09-03 ENCOUNTER — PATIENT MESSAGE (OUTPATIENT)
Dept: INTERNAL MEDICINE | Facility: CLINIC | Age: 39
End: 2019-09-03

## 2019-10-21 ENCOUNTER — OFFICE VISIT (OUTPATIENT)
Dept: INTERNAL MEDICINE | Facility: CLINIC | Age: 39
End: 2019-10-21
Payer: OTHER GOVERNMENT

## 2019-10-21 VITALS
BODY MASS INDEX: 29.17 KG/M2 | HEIGHT: 74 IN | TEMPERATURE: 98 F | WEIGHT: 227.31 LBS | HEART RATE: 76 BPM | DIASTOLIC BLOOD PRESSURE: 76 MMHG | SYSTOLIC BLOOD PRESSURE: 122 MMHG

## 2019-10-21 DIAGNOSIS — M76.31 ILIOTIBIAL BAND SYNDROME OF RIGHT SIDE: Primary | ICD-10-CM

## 2019-10-21 DIAGNOSIS — R45.4 IRRITABILITY: ICD-10-CM

## 2019-10-21 PROCEDURE — 99999 PR PBB SHADOW E&M-EST. PATIENT-LVL III: ICD-10-PCS | Mod: PBBFAC,,, | Performed by: PHYSICIAN ASSISTANT

## 2019-10-21 PROCEDURE — 99213 PR OFFICE/OUTPT VISIT, EST, LEVL III, 20-29 MIN: ICD-10-PCS | Mod: S$PBB,,, | Performed by: PHYSICIAN ASSISTANT

## 2019-10-21 PROCEDURE — 99999 PR PBB SHADOW E&M-EST. PATIENT-LVL III: CPT | Mod: PBBFAC,,, | Performed by: PHYSICIAN ASSISTANT

## 2019-10-21 PROCEDURE — 99213 OFFICE O/P EST LOW 20 MIN: CPT | Mod: PBBFAC,PO | Performed by: PHYSICIAN ASSISTANT

## 2019-10-21 PROCEDURE — 99213 OFFICE O/P EST LOW 20 MIN: CPT | Mod: S$PBB,,, | Performed by: PHYSICIAN ASSISTANT

## 2019-10-21 RX ORDER — ESCITALOPRAM OXALATE 5 MG/1
5 TABLET ORAL DAILY
Qty: 30 TABLET | Refills: 2 | Status: SHIPPED | OUTPATIENT
Start: 2019-10-21 | End: 2019-10-21 | Stop reason: SDUPTHER

## 2019-10-21 RX ORDER — ESCITALOPRAM OXALATE 5 MG/1
5 TABLET ORAL DAILY
Qty: 30 TABLET | Refills: 6 | Status: ON HOLD | OUTPATIENT
Start: 2019-10-21 | End: 2020-03-04

## 2019-10-22 NOTE — PROGRESS NOTES
"Subjective:       Patient ID: Matthew Graham Blakemore is a 39 y.o. male.    Chief Complaint: Knee Pain and Medication Refill    HPI     Patient comes in today to discuss some right knee pain that goes along with running, going up stairs.   Better with rest and ice. Located on the lateral portion of right knee   No swelling or joint laxity     Also doing well on lexapro, would like refill. Feels anxiety/irritability has improved.     Health Maintenance Due   Topic Date Due    TETANUS VACCINE  08/17/1997    Pneumococcal Vaccine (Medium Risk) (1 of 1 - PPSV23) 08/17/1998       History reviewed. No pertinent past medical history.    Current Outpatient Medications   Medication Sig Dispense Refill    escitalopram oxalate (LEXAPRO) 5 MG Tab Take 1 tablet (5 mg total) by mouth once daily. 30 tablet 6     No current facility-administered medications for this visit.        Review of Systems   Constitutional: Negative for fatigue, fever and unexpected weight change.   HENT: Negative for sore throat and trouble swallowing.    Respiratory: Negative for cough and shortness of breath.    Cardiovascular: Negative for chest pain.   Gastrointestinal: Negative for abdominal pain.   Musculoskeletal: Positive for arthralgias.   Hematological: Negative for adenopathy. Does not bruise/bleed easily.   All other systems reviewed and are negative.      Objective:   /76   Pulse 76   Temp 98.2 °F (36.8 °C) (Oral)   Ht 6' 2" (1.88 m)   Wt 103.1 kg (227 lb 4.7 oz)   BMI 29.18 kg/m²      Physical Exam   Constitutional: He is oriented to person, place, and time. He appears well-developed and well-nourished.   HENT:   Head: Normocephalic and atraumatic.   Right Ear: External ear normal.   Left Ear: External ear normal.   Musculoskeletal:        Legs:  Neurological: He is alert and oriented to person, place, and time.   Skin: Skin is warm.         Lab Results   Component Value Date    WBC 4.75 01/25/2019    HGB 15.4 01/25/2019    HCT " 48.2 01/25/2019     01/25/2019    CHOL 154 06/12/2018    TRIG 102 06/12/2018    HDL 28 (L) 06/12/2018     01/25/2019    K 4.0 01/25/2019     01/25/2019    CREATININE 0.9 01/25/2019    BUN 12 01/25/2019    CO2 30 (H) 01/25/2019       Assessment:       1. Iliotibial band syndrome of right side    2. Irritability        Plan:   Iliotibial band syndrome of right side  Home stretching, exercises for IT band syndrome   P.T. If no improvement   Ice for pain   Irritability  Cont medication   -     escitalopram oxalate (LEXAPRO) 5 MG Tab; Take 1 tablet (5 mg total) by mouth once daily.  Dispense: 30 tablet; Refill: 6        No follow-ups on file.

## 2019-11-07 ENCOUNTER — OFFICE VISIT (OUTPATIENT)
Dept: INTERNAL MEDICINE | Facility: CLINIC | Age: 39
End: 2019-11-07
Payer: OTHER GOVERNMENT

## 2019-11-07 VITALS
BODY MASS INDEX: 28.04 KG/M2 | HEIGHT: 74 IN | TEMPERATURE: 99 F | SYSTOLIC BLOOD PRESSURE: 114 MMHG | WEIGHT: 218.5 LBS | DIASTOLIC BLOOD PRESSURE: 78 MMHG | HEART RATE: 80 BPM

## 2019-11-07 DIAGNOSIS — R50.9 FEVER, UNSPECIFIED FEVER CAUSE: ICD-10-CM

## 2019-11-07 DIAGNOSIS — R11.2 NON-INTRACTABLE VOMITING WITH NAUSEA, UNSPECIFIED VOMITING TYPE: Primary | ICD-10-CM

## 2019-11-07 LAB
CTP QC/QA: YES
POC MOLECULAR INFLUENZA A AGN: NEGATIVE
POC MOLECULAR INFLUENZA B AGN: NEGATIVE

## 2019-11-07 PROCEDURE — 87502 INFLUENZA DNA AMP PROBE: CPT | Mod: PBBFAC,PO | Performed by: PHYSICIAN ASSISTANT

## 2019-11-07 PROCEDURE — 99999 PR PBB SHADOW E&M-EST. PATIENT-LVL III: CPT | Mod: PBBFAC,,, | Performed by: PHYSICIAN ASSISTANT

## 2019-11-07 PROCEDURE — 99213 PR OFFICE/OUTPT VISIT, EST, LEVL III, 20-29 MIN: ICD-10-PCS | Mod: S$PBB,,, | Performed by: PHYSICIAN ASSISTANT

## 2019-11-07 PROCEDURE — 99999 PR PBB SHADOW E&M-EST. PATIENT-LVL III: ICD-10-PCS | Mod: PBBFAC,,, | Performed by: PHYSICIAN ASSISTANT

## 2019-11-07 PROCEDURE — 99213 OFFICE O/P EST LOW 20 MIN: CPT | Mod: PBBFAC,PO | Performed by: PHYSICIAN ASSISTANT

## 2019-11-07 PROCEDURE — 99213 OFFICE O/P EST LOW 20 MIN: CPT | Mod: S$PBB,,, | Performed by: PHYSICIAN ASSISTANT

## 2019-11-07 RX ORDER — ONDANSETRON 8 MG/1
8 TABLET, ORALLY DISINTEGRATING ORAL EVERY 6 HOURS PRN
Qty: 10 TABLET | Refills: 0 | Status: ON HOLD | OUTPATIENT
Start: 2019-11-07 | End: 2020-03-04

## 2019-11-07 RX ORDER — BENZONATATE 200 MG/1
200 CAPSULE ORAL 3 TIMES DAILY PRN
Qty: 30 CAPSULE | Refills: 0 | Status: SHIPPED | OUTPATIENT
Start: 2019-11-07 | End: 2019-11-17

## 2019-11-07 NOTE — PROGRESS NOTES
"Subjective:       Patient ID: Matthew Graham Blakemore is a 39 y.o. male.    Chief Complaint: Emesis; Cough; and Generalized Body Aches    HPI  Patient comes in today for mucus production, cough and vomiting   Also having body aches     States he has been coughing so much he vomits at times.    Fever last night     No diarrhea   No stomach pain       Health Maintenance Due   Topic Date Due    TETANUS VACCINE  08/17/1997    Pneumococcal Vaccine (Medium Risk) (1 of 1 - PPSV23) 08/17/1998       History reviewed. No pertinent past medical history.    Current Outpatient Medications   Medication Sig Dispense Refill    escitalopram oxalate (LEXAPRO) 5 MG Tab Take 1 tablet (5 mg total) by mouth once daily. 30 tablet 6    benzonatate (TESSALON) 200 MG capsule Take 1 capsule (200 mg total) by mouth 3 (three) times daily as needed. 30 capsule 0    ondansetron (ZOFRAN-ODT) 8 MG TbDL Take 1 tablet (8 mg total) by mouth every 6 (six) hours as needed. 10 tablet 0     No current facility-administered medications for this visit.        Review of Systems   Constitutional: Negative for fatigue, fever and unexpected weight change.   HENT: Negative for sore throat and trouble swallowing.    Eyes: Negative for photophobia, redness and visual disturbance.   Respiratory: Positive for cough. Negative for shortness of breath.    Cardiovascular: Negative for chest pain.   Gastrointestinal: Positive for vomiting. Negative for abdominal pain.   Skin: Negative for color change, pallor, rash and wound.   Hematological: Negative for adenopathy. Does not bruise/bleed easily.   All other systems reviewed and are negative.      Objective:   /78   Pulse 80   Temp 98.6 °F (37 °C) (Oral)   Ht 6' 2" (1.88 m)   Wt 99.1 kg (218 lb 7.6 oz)   BMI 28.05 kg/m²      Physical Exam   Constitutional: He is oriented to person, place, and time. He appears well-developed and well-nourished. No distress.   HENT:   Head: Normocephalic and atraumatic. "   Right Ear: External ear normal.   Left Ear: External ear normal.   Mouth/Throat: Oropharynx is clear and moist.   Eyes: Pupils are equal, round, and reactive to light. EOM are normal.   Neck: Normal range of motion. Neck supple.   Cardiovascular: Normal rate, regular rhythm, normal heart sounds and intact distal pulses.   Pulmonary/Chest: Effort normal and breath sounds normal.   Abdominal: Soft. Bowel sounds are normal. He exhibits no distension and no mass. There is no tenderness. There is no guarding.   Musculoskeletal: He exhibits no edema.   Neurological: He is alert and oriented to person, place, and time.   Skin: Skin is warm. Capillary refill takes less than 2 seconds.   Psychiatric: He has a normal mood and affect. His behavior is normal.         Lab Results   Component Value Date    WBC 4.75 01/25/2019    HGB 15.4 01/25/2019    HCT 48.2 01/25/2019     01/25/2019    CHOL 154 06/12/2018    TRIG 102 06/12/2018    HDL 28 (L) 06/12/2018     01/25/2019    K 4.0 01/25/2019     01/25/2019    CREATININE 0.9 01/25/2019    BUN 12 01/25/2019    CO2 30 (H) 01/25/2019       Assessment:       1. Non-intractable vomiting with nausea, unspecified vomiting type    2. Fever, unspecified fever cause        Plan:   Non-intractable vomiting with nausea, unspecified vomiting type    Fever, unspecified fever cause  -     POCT Influenza A/B Molecular    -     ondansetron (ZOFRAN-ODT) 8 MG TbDL; Take 1 tablet (8 mg total) by mouth every 6 (six) hours as needed.  Dispense: 10 tablet; Refill: 0  -     benzonatate (TESSALON) 200 MG capsule; Take 1 capsule (200 mg total) by mouth 3 (three) times daily as needed.  Dispense: 30 capsule; Refill: 0  Seems to be viral   Negative flu     Fluids   Cough suppressants

## 2019-11-11 ENCOUNTER — PATIENT MESSAGE (OUTPATIENT)
Dept: INTERNAL MEDICINE | Facility: CLINIC | Age: 39
End: 2019-11-11

## 2019-11-29 ENCOUNTER — PATIENT MESSAGE (OUTPATIENT)
Dept: INTERNAL MEDICINE | Facility: CLINIC | Age: 39
End: 2019-11-29

## 2020-01-14 ENCOUNTER — LAB VISIT (OUTPATIENT)
Dept: LAB | Facility: HOSPITAL | Age: 40
End: 2020-01-14
Attending: PHYSICIAN ASSISTANT
Payer: OTHER GOVERNMENT

## 2020-01-14 ENCOUNTER — OFFICE VISIT (OUTPATIENT)
Dept: INTERNAL MEDICINE | Facility: CLINIC | Age: 40
End: 2020-01-14
Payer: OTHER GOVERNMENT

## 2020-01-14 ENCOUNTER — TELEPHONE (OUTPATIENT)
Dept: UROLOGY | Facility: CLINIC | Age: 40
End: 2020-01-14

## 2020-01-14 VITALS
TEMPERATURE: 98 F | BODY MASS INDEX: 29.88 KG/M2 | HEART RATE: 56 BPM | DIASTOLIC BLOOD PRESSURE: 80 MMHG | HEIGHT: 74 IN | WEIGHT: 232.81 LBS | SYSTOLIC BLOOD PRESSURE: 114 MMHG

## 2020-01-14 DIAGNOSIS — R35.0 URINARY FREQUENCY: ICD-10-CM

## 2020-01-14 DIAGNOSIS — R39.15 URGENCY OF URINATION: ICD-10-CM

## 2020-01-14 DIAGNOSIS — M54.50 LOW BACK PAIN, NON-SPECIFIC: ICD-10-CM

## 2020-01-14 DIAGNOSIS — N48.89 PENILE PAIN: ICD-10-CM

## 2020-01-14 DIAGNOSIS — R35.0 URINARY FREQUENCY: Primary | ICD-10-CM

## 2020-01-14 LAB
ANION GAP SERPL CALC-SCNC: 7 MMOL/L (ref 8–16)
BASOPHILS # BLD AUTO: 0.1 K/UL (ref 0–0.2)
BASOPHILS NFR BLD: 1.6 % (ref 0–1.9)
BUN SERPL-MCNC: 13 MG/DL (ref 6–20)
CALCIUM SERPL-MCNC: 9.6 MG/DL (ref 8.7–10.5)
CHLORIDE SERPL-SCNC: 104 MMOL/L (ref 95–110)
CO2 SERPL-SCNC: 27 MMOL/L (ref 23–29)
CREAT SERPL-MCNC: 1 MG/DL (ref 0.5–1.4)
DIFFERENTIAL METHOD: NORMAL
EOSINOPHIL # BLD AUTO: 0.2 K/UL (ref 0–0.5)
EOSINOPHIL NFR BLD: 2.5 % (ref 0–8)
ERYTHROCYTE [DISTWIDTH] IN BLOOD BY AUTOMATED COUNT: 12.8 % (ref 11.5–14.5)
EST. GFR  (AFRICAN AMERICAN): >60 ML/MIN/1.73 M^2
EST. GFR  (NON AFRICAN AMERICAN): >60 ML/MIN/1.73 M^2
GLUCOSE SERPL-MCNC: 83 MG/DL (ref 70–110)
HCT VFR BLD AUTO: 47.5 % (ref 40–54)
HGB BLD-MCNC: 15.5 G/DL (ref 14–18)
IMM GRANULOCYTES # BLD AUTO: 0.03 K/UL (ref 0–0.04)
IMM GRANULOCYTES NFR BLD AUTO: 0.5 % (ref 0–0.5)
LYMPHOCYTES # BLD AUTO: 2.2 K/UL (ref 1–4.8)
LYMPHOCYTES NFR BLD: 34.4 % (ref 18–48)
MCH RBC QN AUTO: 29 PG (ref 27–31)
MCHC RBC AUTO-ENTMCNC: 32.6 G/DL (ref 32–36)
MCV RBC AUTO: 89 FL (ref 82–98)
MONOCYTES # BLD AUTO: 0.4 K/UL (ref 0.3–1)
MONOCYTES NFR BLD: 6.5 % (ref 4–15)
NEUTROPHILS # BLD AUTO: 3.5 K/UL (ref 1.8–7.7)
NEUTROPHILS NFR BLD: 54.5 % (ref 38–73)
NRBC BLD-RTO: 0 /100 WBC
PLATELET # BLD AUTO: 300 K/UL (ref 150–350)
PMV BLD AUTO: 10.5 FL (ref 9.2–12.9)
POTASSIUM SERPL-SCNC: 4 MMOL/L (ref 3.5–5.1)
RBC # BLD AUTO: 5.35 M/UL (ref 4.6–6.2)
SODIUM SERPL-SCNC: 138 MMOL/L (ref 136–145)
WBC # BLD AUTO: 6.42 K/UL (ref 3.9–12.7)

## 2020-01-14 PROCEDURE — 99214 PR OFFICE/OUTPT VISIT, EST, LEVL IV, 30-39 MIN: ICD-10-PCS | Mod: S$PBB,,, | Performed by: PHYSICIAN ASSISTANT

## 2020-01-14 PROCEDURE — 84153 ASSAY OF PSA TOTAL: CPT

## 2020-01-14 PROCEDURE — 87086 URINE CULTURE/COLONY COUNT: CPT

## 2020-01-14 PROCEDURE — 36415 COLL VENOUS BLD VENIPUNCTURE: CPT | Mod: PO

## 2020-01-14 PROCEDURE — 85025 COMPLETE CBC W/AUTO DIFF WBC: CPT

## 2020-01-14 PROCEDURE — 99999 PR PBB SHADOW E&M-EST. PATIENT-LVL IV: ICD-10-PCS | Mod: PBBFAC,,, | Performed by: PHYSICIAN ASSISTANT

## 2020-01-14 PROCEDURE — 80048 BASIC METABOLIC PNL TOTAL CA: CPT

## 2020-01-14 PROCEDURE — 99214 OFFICE O/P EST MOD 30 MIN: CPT | Mod: PBBFAC,PO | Performed by: PHYSICIAN ASSISTANT

## 2020-01-14 PROCEDURE — 99999 PR PBB SHADOW E&M-EST. PATIENT-LVL IV: CPT | Mod: PBBFAC,,, | Performed by: PHYSICIAN ASSISTANT

## 2020-01-14 PROCEDURE — 81002 URINALYSIS NONAUTO W/O SCOPE: CPT | Mod: PBBFAC,PO | Performed by: PHYSICIAN ASSISTANT

## 2020-01-14 PROCEDURE — 99214 OFFICE O/P EST MOD 30 MIN: CPT | Mod: S$PBB,,, | Performed by: PHYSICIAN ASSISTANT

## 2020-01-14 NOTE — TELEPHONE ENCOUNTER
----- Message from Mickey Xavier LPN sent at 1/14/2020  3:55 PM CST -----  Patient is an established patient with the Urology department. Can someone please schedule patient a sooner appointment than what is populating in March. He is having frequent urination and numbness feeling in penis. Patient had a vasectomy one year ago in March. Thanks!

## 2020-01-14 NOTE — TELEPHONE ENCOUNTER
Spoke to Tamiko Blakemore. He said that his penis has been constantly numb since Friday and has frequent urination. I offered an appt for 2/5/20 at 9:30 am, he agreed.

## 2020-01-14 NOTE — PROGRESS NOTES
"Subjective:       Patient ID: Matthew Graham Blakemore is a 39 y.o. male.    Chief Complaint: Urinary Frequency; Urinary Retention; and Numbness (penis)    HPI    Patient comes in today with complaint of 2-3 months of urinary issues  He states he has some frequency, difficultly voiding at times, and urgency.   Also states his penis sometimes "feels numb" when he voids  But reports no changes in appearance, feeling, etc.     No penile or testicular pain, no swelling     He does report he had a major back injury in the  that resulted in some bladder/bowel dysfunction years ago.   States he healed well from that  No new injuries   Health Maintenance Due   Topic Date Due    TETANUS VACCINE  08/17/1997    Pneumococcal Vaccine (Medium Risk) (1 of 1 - PPSV23) 08/17/1998       History reviewed. No pertinent past medical history.    Current Outpatient Medications   Medication Sig Dispense Refill    escitalopram oxalate (LEXAPRO) 5 MG Tab Take 1 tablet (5 mg total) by mouth once daily. 30 tablet 6    ondansetron (ZOFRAN-ODT) 8 MG TbDL Take 1 tablet (8 mg total) by mouth every 6 (six) hours as needed. 10 tablet 0     No current facility-administered medications for this visit.        Review of Systems   Constitutional: Negative for activity change and unexpected weight change.   HENT: Positive for hearing loss and trouble swallowing. Negative for rhinorrhea.    Eyes: Negative for discharge and visual disturbance.   Respiratory: Negative for chest tightness and wheezing.    Cardiovascular: Negative for chest pain and palpitations.   Gastrointestinal: Negative for blood in stool, constipation, diarrhea and vomiting.   Endocrine: Positive for polyuria. Negative for polydipsia.   Genitourinary: Positive for difficulty urinating. Negative for hematuria and urgency.   Musculoskeletal: Positive for arthralgias. Negative for joint swelling and neck pain.   Neurological: Negative for weakness and headaches. " "  Psychiatric/Behavioral: Negative for confusion and dysphoric mood.       Objective:   /80   Pulse (!) 56   Temp 97.9 °F (36.6 °C) (Oral)   Ht 6' 2" (1.88 m)   Wt 105.6 kg (232 lb 12.9 oz)   BMI 29.89 kg/m²      Physical Exam   Constitutional: He is oriented to person, place, and time. He appears well-developed and well-nourished. No distress.   HENT:   Head: Normocephalic and atraumatic.   Eyes: Pupils are equal, round, and reactive to light. EOM are normal.   Neck: Neck supple.   Cardiovascular: Normal rate.   Pulmonary/Chest: Effort normal.   Musculoskeletal: He exhibits no edema.   Neurological: He is alert and oriented to person, place, and time.   Skin: Capillary refill takes less than 2 seconds.   Psychiatric: He has a normal mood and affect. His behavior is normal.         Lab Results   Component Value Date    WBC 6.42 01/14/2020    HGB 15.5 01/14/2020    HCT 47.5 01/14/2020     01/14/2020    CHOL 154 06/12/2018    TRIG 102 06/12/2018    HDL 28 (L) 06/12/2018     01/14/2020    K 4.0 01/14/2020     01/14/2020    CREATININE 1.0 01/14/2020    BUN 13 01/14/2020    CO2 27 01/14/2020       Assessment:       1. Urinary frequency    2. Urgency of urination    3. Low back pain, non-specific    4. Penile pain        Plan:   Urinary frequency  -     POCT URINE DIPSTICK WITHOUT MICROSCOPE  -     Ambulatory referral to Urology  -     CULTURE, URINE  -     PROSTATE SPECIFIC ANTIGEN, DIAGNOSTIC; Future; Expected date: 01/14/2020  -     CBC auto differential; Future; Expected date: 01/14/2020  -     Basic metabolic panel; Future; Expected date: 01/14/2020    Urgency of urination  -     Ambulatory referral to Urology  -     CULTURE, URINE  -     PROSTATE SPECIFIC ANTIGEN, DIAGNOSTIC; Future; Expected date: 01/14/2020  -     CBC auto differential; Future; Expected date: 01/14/2020  -     Basic metabolic panel; Future; Expected date: 01/14/2020    Low back pain, non-specific  -     Cancel: MRI " Lumbar Spine Without Contrast; Future; Expected date: 01/14/2020  -     MRI Lumbar Spine W WO Contrast; Future; Expected date: 01/14/2020  -     PROSTATE SPECIFIC ANTIGEN, DIAGNOSTIC; Future; Expected date: 01/14/2020  -     CBC auto differential; Future; Expected date: 01/14/2020  -     Basic metabolic panel; Future; Expected date: 01/14/2020    Penile pain  -     PROSTATE SPECIFIC ANTIGEN, DIAGNOSTIC; Future; Expected date: 01/14/2020  -     CBC auto differential; Future; Expected date: 01/14/2020  -     Basic metabolic panel; Future; Expected date: 01/14/2020      Given symptoms want to evaluate urine, labs, MRI and possibly get a urology consult   Signs and symptoms are not acute       No follow-ups on file.

## 2020-01-15 ENCOUNTER — PATIENT MESSAGE (OUTPATIENT)
Dept: INTERNAL MEDICINE | Facility: CLINIC | Age: 40
End: 2020-01-15

## 2020-01-15 LAB
BACTERIA UR CULT: NO GROWTH
COMPLEXED PSA SERPL-MCNC: 0.42 NG/ML (ref 0–4)

## 2020-01-17 ENCOUNTER — TELEPHONE (OUTPATIENT)
Dept: INTERNAL MEDICINE | Facility: CLINIC | Age: 40
End: 2020-01-17

## 2020-01-17 NOTE — TELEPHONE ENCOUNTER
Patient's referral to Dr Jhoan Hwang has been authorized by [facility].  Authorization #[id number]; 4 visits; visit date range 1/11/2020 to 1/10/2021     Informed pt that his referral to Dr. Hwang has been approved.

## 2020-01-18 ENCOUNTER — HOSPITAL ENCOUNTER (EMERGENCY)
Facility: HOSPITAL | Age: 40
Discharge: HOME | End: 2020-01-18
Attending: EMERGENCY MEDICINE
Payer: OTHER GOVERNMENT

## 2020-01-18 VITALS
BODY MASS INDEX: 30.58 KG/M2 | HEIGHT: 72 IN | SYSTOLIC BLOOD PRESSURE: 122 MMHG | OXYGEN SATURATION: 98 % | DIASTOLIC BLOOD PRESSURE: 72 MMHG | RESPIRATION RATE: 16 BRPM | WEIGHT: 225.75 LBS | TEMPERATURE: 99 F | HEART RATE: 61 BPM

## 2020-01-18 DIAGNOSIS — R51.9 OCCIPITAL HEADACHE: ICD-10-CM

## 2020-01-18 DIAGNOSIS — H81.10 BENIGN PAROXYSMAL POSITIONAL VERTIGO, UNSPECIFIED LATERALITY: Primary | ICD-10-CM

## 2020-01-18 PROCEDURE — 63600175 PHARM REV CODE 636 W HCPCS: Performed by: REGISTERED NURSE

## 2020-01-18 PROCEDURE — 99284 EMERGENCY DEPT VISIT MOD MDM: CPT | Mod: 25

## 2020-01-18 PROCEDURE — 25000003 PHARM REV CODE 250: Performed by: REGISTERED NURSE

## 2020-01-18 PROCEDURE — 96372 THER/PROPH/DIAG INJ SC/IM: CPT

## 2020-01-18 RX ORDER — DIAZEPAM 5 MG/1
5 TABLET ORAL
Status: COMPLETED | OUTPATIENT
Start: 2020-01-18 | End: 2020-01-18

## 2020-01-18 RX ORDER — KETOROLAC TROMETHAMINE 30 MG/ML
60 INJECTION, SOLUTION INTRAMUSCULAR; INTRAVENOUS
Status: COMPLETED | OUTPATIENT
Start: 2020-01-18 | End: 2020-01-18

## 2020-01-18 RX ORDER — DIAZEPAM 5 MG/1
5 TABLET ORAL EVERY 6 HOURS PRN
Qty: 3 TABLET | Refills: 0 | Status: SHIPPED | OUTPATIENT
Start: 2020-01-18 | End: 2022-01-05 | Stop reason: ALTCHOICE

## 2020-01-18 RX ADMIN — KETOROLAC TROMETHAMINE 60 MG: 30 INJECTION, SOLUTION INTRAMUSCULAR at 11:01

## 2020-01-18 RX ADMIN — DIAZEPAM 5 MG: 5 TABLET ORAL at 11:01

## 2020-01-18 NOTE — ED PROVIDER NOTES
Encounter Date: 1/18/2020       History     Chief Complaint   Patient presents with    Headache     headache, vertigo, nausea, no vomiting, started 4 am this morning.     The history is provided by the patient.   Headache    This is a new problem. The current episode started today. The problem occurs constantly. The problem has been unchanged. The pain is located in the occipital region. The pain does not radiate. The pain quality is similar to prior headaches. The quality of the pain is described as aching. The pain is at a severity of 8/10. Associated symptoms include dizziness. Pertinent negatives include no back pain, blurred vision, fever, nausea, sore throat or weakness.   Hx of BPV, pt reports dizziness worse with changes in position, denies any weakness, slurred speech, visual changes, fevers, NVD or any other symptoms at this time.    Review of patient's allergies indicates:   Allergen Reactions    Penicillins Other (See Comments)     unknown     No past medical history on file.  Past Surgical History:   Procedure Laterality Date    HERNIA REPAIR      ULTRASOUND GUIDANCE Left 1/31/2019    Procedure: ULTRASOUND GUIDANCE;  Surgeon: Travis Flowers IV, MD;  Location: [facility] OR;  Service: Urology;  Laterality: Left;  Doppler ultrasound of left testicular artery    VASECTOMY Bilateral 1/31/2019    Procedure: VASECTOMY;  Surgeon: Travis Flowers IV, MD;  Location: [facility] OR;  Service: Urology;  Laterality: Bilateral;   Vasectomy, bilateral with excision and scrotal exploration on left side; difficult     Family History   Problem Relation Age of Onset    Heart attacks under age 50 Maternal Grandfather     Melanoma Mother     Melanoma Father     Melanoma Sister     Melanoma Brother      Social History     Tobacco Use    Smoking status: Current Every Day Smoker     Types: Vaping w/o nicotine    Smokeless tobacco: Never Used   Substance Use Topics    Alcohol use: No     Frequency: Monthly or less      Drinks per session: 1 or 2     Binge frequency: Never    Drug use: No     Review of Systems   Constitutional: Negative for fever.   HENT: Negative for sore throat.    Eyes: Negative for blurred vision.   Respiratory: Negative for shortness of breath.    Cardiovascular: Negative for chest pain.   Gastrointestinal: Negative for nausea.   Genitourinary: Negative for dysuria.   Musculoskeletal: Negative for back pain.   Skin: Negative for rash.   Neurological: Positive for dizziness and headaches. Negative for weakness.   Hematological: Does not bruise/bleed easily.   All other systems reviewed and are negative.      Physical Exam     Initial Vitals [01/18/20 1108]   BP Pulse Resp Temp SpO2   122/72 61 16 98.5 °F (36.9 °C) 98 %      MAP       --         Physical Exam    Constitutional: He appears well-developed and well-nourished. No distress.   HENT:   Head: Normocephalic and atraumatic.   Right Ear: Tympanic membrane normal.   Left Ear: Tympanic membrane normal.   Nose: Nose normal.   Mouth/Throat: Uvula is midline and oropharynx is clear and moist.   Eyes: Conjunctivae and EOM are normal. Pupils are equal, round, and reactive to light.   Neck: Normal range of motion. Neck supple.   Cardiovascular: Normal rate and regular rhythm.   Pulmonary/Chest: Effort normal and breath sounds normal. No respiratory distress. He has no decreased breath sounds. He has no wheezes. He has no rales.   Abdominal: Soft. Normal appearance and bowel sounds are normal. There is no tenderness.   Musculoskeletal: Normal range of motion.   Neurological: He is alert and oriented to person, place, and time. He has normal strength. No cranial nerve deficit or sensory deficit. He displays a negative Romberg sign. GCS eye subscore is 4. GCS verbal subscore is 5. GCS motor subscore is 6.   Skin: Skin is warm and dry. Capillary refill takes less than 2 seconds. No rash noted.   Psychiatric: He has a normal mood and affect. His speech is normal and  behavior is normal.         ED Course   Procedures  Labs Reviewed - No data to display       Imaging Results    None            I discussed with patient and/or family/caretaker that evaluation in the ED does not suggest any emergent or life threatening medical conditions requiring immediate intervention beyond what was provided in the ED, and I believe patient is safe for discharge.  Regardless, an unremarkable evaluation in the ED does not preclude the development or presence of a serious of life threatening condition. As such, patient was instructed to return immediately for any worsening or change in current symptoms.                                 Clinical Impression:       ICD-10-CM ICD-9-CM   1. Benign paroxysmal positional vertigo, unspecified laterality H81.10 386.11   2. Occipital headache R51 784.0                             Yuri Aguilera Jr., [facility]  01/18/20 1247

## 2020-01-19 ENCOUNTER — PATIENT MESSAGE (OUTPATIENT)
Dept: INTERNAL MEDICINE | Facility: CLINIC | Age: 40
End: 2020-01-19

## 2020-01-19 DIAGNOSIS — H81.10 BENIGN PAROXYSMAL POSITIONAL VERTIGO, UNSPECIFIED LATERALITY: Primary | ICD-10-CM

## 2020-01-20 ENCOUNTER — PATIENT MESSAGE (OUTPATIENT)
Dept: INTERNAL MEDICINE | Facility: CLINIC | Age: 40
End: 2020-01-20

## 2020-01-24 NOTE — PROGRESS NOTES
Referring Provider:    Carlos Enrique Garza  [address] [place] [address]  Subjective:   Patient: Matthew Graham Blakemore [id number], :1980   Visit date:2020 11:14 AM    Chief Complaint:  No chief complaint on file.    HPI:  Richard is a 39 y.o. male who I was asked to see in consultation for evaluation of the following issue(s):    Dizziness/Vertigo:  Onset:  many year(s) ago  Total number of episodes:  2 per month  Duration of individual episodes: minutes  Severity: severe  Exacerbating factors: bending over, lying down, turning head and sitting up  Prior Medications: meclizine (Antivert) - has not taken in years, never helped.   Relieving factors:  remaining motionless  Associated signs and symptoms:  HL, tinnitus AS  Quality:   Spinning- Yes   Light headedness- No   Sensation that room is moving but patient is motionless- Yes  Prior history of similar events: Yes    He has performed Epley's on his own at home     Hearing Loss:    He describes a equally, bilateral sided hearing loss starting many years ago and has been gradually worsening.      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      Tinnitus:    Patient presents with tinnitus. Onset of symptoms was gradual starting a few years ago ago with gradually worsening course since that time. Patient describes the tinnitus as constant located in the left ear. The quality is described as high pitch. The pattern is  nonpulsatile with an intensity that is soft. Patient describes his level of annoyance as minimally annoying, always aware. Associated symptoms include hearing loss and dizziness. Previous treatments include none.    Review of Systems:  Negative unless checked off.  Gen:  []fever   []fatigue  HENT:  []nosebleeds  []dental problem   Eyes:  []photophobia  []visual disturbance  Resp:  []chest tightness []wheezing  Card:  []chest pain  []leg swelling  GI:  []abdominal pain []blood in stool  :  []dysuria  []hematuria  Musc:  []joint swelling  []gait problem  Skin:  []color change  []pallor  Neuro:  []seizures  []numbness  Hem:  []bruise/bleed easily  Psych:  []hallucinations  []behavioral problems  Allergy/Imm: is allergic to penicillins.    His meds, allergies, medical, surgical, social & family histories were reviewed & updated:  -     He has a current medication list which includes the following prescription(s): diazepam, escitalopram oxalate, and ondansetron.  -     He  has no past medical history on file.   -     He does not have any pertinent problems on file.   -     He  has a past surgical history that includes Hernia repair; Vasectomy (Bilateral, 1/31/2019); and Ultrasound guidance (Left, 1/31/2019).  -     He  reports that he has been smoking vaping w/o nicotine. He has never used smokeless tobacco. He reports that he does not drink alcohol or use drugs.  -     His family history includes Heart attacks under age 50 in his maternal grandfather; Melanoma in his brother, father, mother, and sister.  -     He is allergic to penicillins.    Objective:     Physical Exam: (abnormal findings indicated in BOLD)    Physical Exam:  Vitals:  /86 (BP Location: Right arm, Patient Position: Sitting, BP Method: Large (Automatic))   Pulse 77   Temp (!) 95.8 °F (35.4 °C) (Tympanic)   Ht 6' (1.829 m)   Wt 103.8 kg (228 lb 13.4 oz)   BMI 31.04 kg/m²   General appearance:  Well developed, well nourished    Eyes:   Extraocular motions intact, PERRL    Communication:  no hoarseness, no dysphonia    Ears:  Otoscopy of external auditory canals and tympanic membranes was normal, clinical speech reception thresholds grossly intact, no mass/lesion of auricle.  Nose:  No masses/lesions of external nose, nasal mucosa, septum, and turbinates were within normal limits.  Mouth:  No mass/lesion of lips, teeth, gums, hard/soft palate, tongue, tonsils, or oropharynx.    Cardiovascular:  No pedal edema; Radial Pulses +2     Neck & Lymphatics:  No cervical lymphadenopathy, no neck mass/crepitus/ asymmetry, trachea is midline, no thyroid enlargement/tenderness/mass.    Psych: Oriented x3,  Alert with normal mood and affect.     Respiration/Chest:  Symmetric expansion during respiration, normal respiratory effort.    Skin:  Warm and intact. No ulcerations of face, scalp, neck.    CRANIAL NERVES:  III, IV, VI:  Extraocular muscles intact.  Pupils equally reactive to light and accommodation.  V: Facial sensory function to light touch intact and symmetrical.  No evidence of atrophy of the masseter and temporal muscles.  VII:  Facial strength intact and symmetrical.  IX:  Soft palate elevates in the midline.  XI:  Shoulders motility and strength intact and symmetrical. No atrophy of sternocleidomastoid and trapezius muscles.  XII:  Tongue motility and strength intact.  No spontaneous or gaze nystagmus.    Hermitage-Hallpike test:  No vertigo or nystagmus.    Head-shaking test:  No nystagmus.  Vestibulo-ocular reflex:  No evidence of catch-up saccades to bilateral head turns.  Oculo-counter torsion:  Intact to bilateral head tilts.  Romberg test:  No abnormal sway or falls with eyes open and closed.    Fukuda stepping test:  No abnormal sway.    Gait:  No ataxia and can tandem gait 6 steps.    Finger-to-nose testing intact without dysmetria.  Deviation of index.  Eyes closed:  No deviation.  MOTOR STRENGTH:  Strength 5/5 throughout.  SENSORY:  Sensory  function to light touch and proprioception intact throughout.    DH: +right sided up beating rotation nystagmus that fatigued.     Assessment & Plan:   Diagnoses and all orders for this visit:    Benign paroxysmal positional vertigo of right ear    History of hearing loss      DIZZINESS-   Dizziness is an extremely complex problem that may arise from many sources.  I requires the coordination between the visual system, the vestibular system as well as the proprioreceptive system.  Additionally, balance is compromised in the setting of musculoskeletal, cerebral, cardiac, and numerous physiologic disorders.  The complex interplay between these systems may also lead to dizziness if there is dysynchrony between the bilateral vestibular symptoms.    Central vestibular symptoms can generally be distinguished from peripheral vestibulopathies by the presence of other non vestibular neurologic symptoms (focal weakness, headache), light headedness (rather than true vertigo), near syncope, weak limbs, panic, fuzziness/cloudiness in mentation, and clumsiness.  Peripheral vestibulopathies are generally, at some point during their course, characterized by a true vertiginous sensation of movement, difficulty with sudden head movements, nausea, difficulty with sudden head movement, and possibly oscicllopsia.    BPPV is the most common cause of episodic vertigo.  Symptoms generally last for seconds then resolve when motionless, otitic symptoms are absent and may be provoked with sudden head motion.  This may occur spontaneously, but frequently follow head trauma or recent vestibular neuronitis.  Nystagmus is typically geotropic and directed toward the affected ear (hyperactive input to the inferior vestibular nerve via the Singular nerve). Canalith repositioning maneuvers are the method of choice for treating this condition.  Mild imbalance following is common and may last 1-2 weeks.    Vestibular neuronitis and labyrinthitis can be  distinguished by the presence of sensorineural hearing loss in labyrinthitis, but during their active phase, vertigo is constant.   MRI may be necessary during this phase to exclude CNS pathology.  Frequently this is preceded by a viral illness. Both result in permanent partial end organ weakness of the affected vestibular nerve (usually superior).  Otherwise, they are essentially the same.  The early (vertiginous, 1-3 days) phase is characterized by acute onset of vertigo that is essentially constant and present even in the absence of motion.  Nystagmus is directed away from the affected ear (hypoactive).  In the acute phase, steroids, limited use of vestibular suppressants and hydration are the most effective treatment. Patients then enter the second phase of uncompensated vestibulopathy where they have a general sense of imbalance.  The duration of this phase depends on several factors, but is generally delayed in the presence of vestibular suppressants, advanced age, central/systemic balance issues and sessile behavior.   Phase 3 is compensated vestibulopathy where symptoms generally only occur with sudden head movements and can be seen with catch up saccades on head thrust.   However, in the presence of bilateral VN, oscillopsia is the hallmark of the disease and compensation is frequently very delayed and poor.    Other causes of vertigo that are typically constant are vestibulotoxic medications and autoimmune inner ear disease and these are generally bilateral in nature.    Meniere's disease is typically (85%) unilateral and defined by 2 spontaneous vertigo attacks lasting 20 min or more, documented hearing loss (typically low tone, frequently fluctuating) and otic fullness or tinnitus in the affected ear. However, the presence of endolymphatic hydrops may result in similar symptoms, not meeting these strict criteria.  This disease can be difficult to distinguish from vestibular migraines, which are not  always associated with headaches.    Superior canal dehiscence presents with episodic vertigo lasting seconds to minutes, aural fullness, autophony, hyperacusis and tinnitus (jona pulsatile).  Aural pressure is the most common presenting symptom.  Symptoms can be provoked with Valsalva.  Bone conduction thresholds are supranormal.    Perilymph fistula presents with fluctuating hearing loss, episodic vertigo lasting seconds to minutes and frequently is associated with prior barotrauma or otologic surgery.  Conservative measures such as stool softeners and bedrest frequently lead to resolution.  In cases of persistent symptoms, unfortunately there is no noninvasive diagnostic test with high specificity, and surgical exploration is sometimes necessary when this is expected.    Vestibular schwannomas may have constant or episodic vertigo, frequently SNHL and sometimes may be accompanied by headache or facial numbness.    The diagnosis and options of management were discussed at length with the patient, including hearing, balance function and the risks associated with my recommendations. We spent a considerable amount of time discussing strategies to cope with dizziness. I emphasized the great importance of fall avoidance and activities that may be dangerous if Richard has an episode of dizziness.  I answered all questions in layman's terms. Based on the history, physical exam and imaging studies there is moderate evidence of a vestibular dysfunction.      I recommend:    1 week f/u with audio and VNG.     We discussed his medical conditions, treatments and plan.  Richard should return to clinic if any issues arise (symptoms worsen or persist), otherwise we will see him back in the clinic after labs and/or imaging, consults, etc. have been completed.    Thank you for allowing me to participate in the care of Richard.      Tamiko Milan PA-C  [facility]   [facility]  [address]  [place] [address]  P: [phone]  F: [phone]    Patient: Matthew Graham Blakemore [id number], :1980  Procedure date:2020  Patient's medications, allergies, past medical, surgical, social and family histories were reviewed and updated as appropriate.  Chief Complaint:  No chief complaint on file.    HPI:  Richard is a 39 y.o. male with benign paroxysmal positional vertigo (BPPV) refractory to medical therapy. Hallpike-Carissa maneuver demonstrates nystagmus of delayed onset that fatigues, rotary, clockwise, associated with vertigo.    Procedure: Risks, benefits, and alternatives of the procedure were discussed with the patient, and the patient consented to the Epley maneuver or canalith repositioning procedure (CRP).      With the patient sitting upright on the examination table, the head was lowered to the supine position and the neck rotated 45 degrees to the right side; once nystagmus fatigued, the body and head were slowly rotated to the opposite side 90 degrees, and then after 30-60 seconds the rotation continued another 90 degrees (they rolled onto their shoulder and were looking downwards at a 45 degree angle). After the nystagmus resolved, the patient was gently allowed to sit up with neck flexion.    There were no complications and the patient tolerated it well.  Post-procedure instructions were given.    Tamiko Milan performed the entire procedure.

## 2020-01-27 ENCOUNTER — OFFICE VISIT (OUTPATIENT)
Dept: OTOLARYNGOLOGY | Facility: CLINIC | Age: 40
End: 2020-01-27
Payer: OTHER GOVERNMENT

## 2020-01-27 VITALS
TEMPERATURE: 96 F | HEART RATE: 77 BPM | BODY MASS INDEX: 30.99 KG/M2 | DIASTOLIC BLOOD PRESSURE: 86 MMHG | SYSTOLIC BLOOD PRESSURE: 126 MMHG | WEIGHT: 228.81 LBS | HEIGHT: 72 IN

## 2020-01-27 DIAGNOSIS — Z86.69 HISTORY OF HEARING LOSS: ICD-10-CM

## 2020-01-27 DIAGNOSIS — H81.11 BENIGN PAROXYSMAL POSITIONAL VERTIGO OF RIGHT EAR: Primary | ICD-10-CM

## 2020-01-27 PROCEDURE — 99999 PR PBB SHADOW E&M-EST. PATIENT-LVL III: CPT | Mod: PBBFAC,,, | Performed by: PHYSICIAN ASSISTANT

## 2020-01-27 PROCEDURE — 95992 PR CANALITH REPOSITIONING PROCEDURE, PER DAY: ICD-10-PCS | Mod: S$PBB,,, | Performed by: PHYSICIAN ASSISTANT

## 2020-01-27 PROCEDURE — 95992 CANALITH REPOSITIONING PROC: CPT | Mod: PBBFAC | Performed by: PHYSICIAN ASSISTANT

## 2020-01-27 PROCEDURE — 99243 PR OFFICE CONSULTATION,LEVEL III: ICD-10-PCS | Mod: 25,S$PBB,, | Performed by: PHYSICIAN ASSISTANT

## 2020-01-27 PROCEDURE — 99999 PR PBB SHADOW E&M-EST. PATIENT-LVL III: ICD-10-PCS | Mod: PBBFAC,,, | Performed by: PHYSICIAN ASSISTANT

## 2020-01-27 PROCEDURE — 95992 CANALITH REPOSITIONING PROC: CPT | Mod: S$PBB,,, | Performed by: PHYSICIAN ASSISTANT

## 2020-01-27 PROCEDURE — 99213 OFFICE O/P EST LOW 20 MIN: CPT | Mod: PBBFAC | Performed by: PHYSICIAN ASSISTANT

## 2020-01-27 PROCEDURE — 99243 OFF/OP CNSLTJ NEW/EST LOW 30: CPT | Mod: 25,S$PBB,, | Performed by: PHYSICIAN ASSISTANT

## 2020-01-27 NOTE — PATIENT INSTRUCTIONS
INSTRUCTIONS FOR PATIENTS AFTER OFFICE TREATMENT FOR BPPV:      1. Wait for 10 minutes after the maneuver is performed before going home. This is to avoid quick spins or brief bursts of vertigo as debris repositions itself immediately after the maneuver.  2. Sleep semi-recumbent for the next two nights. This means sleeping with your head detention between being flat and upright at a 45 degree angle. This is most easily done by using a recliner chair or by using pillows arranged on a couch (see image below).     3. Sleep on your good side (side that does not cause dizziness) with a pillow for support. Avoid sleeping on your bad side.   4. During the day, try to keep your head vertical. Avoid far head-forward positions such as bending down to touch your toes. No exercise which requires head movements. No sit-ups should be done for one week.   5. Take care when shaving, using eye drops, or washing hair. When men shave under their chin they should bend their bodies forward in order to keep their head vertical. If eye drops are required, try to put them in without tilting the head back. Shampoo only under the shower. Do not tilt your head forward as you would to brush your teeth, or lean over as you would to tie you shoes.   6. Avoid activities such as visiting the beauty parlor, dentists office, gym, or swimming. Try to stay as upright as possible.   7. For at least one week, avoid provoking head positions that might bring BPPV  on again:   a. Use two pillows when you sleep  b. Avoid sleeping on the bad side  c. Do not turn your head far up or far down  8. At one week after treatment, put yourself in the position that usually makes you dizzy. Position yourself cautiously and under conditions in which will not fall or hurt yourself. Let your doctor know how you did.

## 2020-01-27 NOTE — LETTER
January 27, 2020      SARAY Garza  [address] [facility]  [place] [address]           [facility]  [address] [facility]  [place] [address]  Phone: [phone]  Fax: [phone]          Patient: Matthew Graham Blakemore   MR Number: [id number]   YOB: 1980   Date of Visit: 1/27/2020       Dear Darleen Perez:    Thank you for referring Matthew Blakemore to me for evaluation. Attached you will find relevant portions of my assessment and plan of care.    If you have questions, please do not hesitate to call me. I look forward to following Matthew Blakemore along with you.    Sincerely,    Tamiko Mlian PA-C    Enclosure  CC:  No Recipients    If you would like to receive this communication electronically, please contact [email or web address] or [phone] to request more information on [employer] Link access.    For providers and/or their staff who would like to refer a patient to [facility], please contact us through our one-stop-shop provider referral line, [facility], at [phone].    If you feel you have received this communication in error or would no longer like to receive these types of communications, please e-mail [email or web address]

## 2020-02-05 ENCOUNTER — OFFICE VISIT (OUTPATIENT)
Dept: UROLOGY | Facility: CLINIC | Age: 40
End: 2020-02-05
Payer: OTHER GOVERNMENT

## 2020-02-05 ENCOUNTER — TELEPHONE (OUTPATIENT)
Dept: UROLOGY | Facility: CLINIC | Age: 40
End: 2020-02-05

## 2020-02-05 VITALS
BODY MASS INDEX: 31.08 KG/M2 | DIASTOLIC BLOOD PRESSURE: 68 MMHG | SYSTOLIC BLOOD PRESSURE: 132 MMHG | WEIGHT: 229.19 LBS

## 2020-02-05 DIAGNOSIS — N32.81 OAB (OVERACTIVE BLADDER): ICD-10-CM

## 2020-02-05 DIAGNOSIS — N52.9 ERECTILE DYSFUNCTION, UNSPECIFIED ERECTILE DYSFUNCTION TYPE: ICD-10-CM

## 2020-02-05 DIAGNOSIS — M54.9 BACK PAIN, UNSPECIFIED BACK LOCATION, UNSPECIFIED BACK PAIN LATERALITY, UNSPECIFIED CHRONICITY: Primary | ICD-10-CM

## 2020-02-05 LAB
BILIRUB SERPL-MCNC: NORMAL MG/DL
BILIRUB SERPL-MCNC: NORMAL MG/DL
BLOOD URINE, POC: NORMAL
BLOOD URINE, POC: NORMAL
COLOR, POC UA: NORMAL
COLOR, POC UA: YELLOW
GLUCOSE UR QL STRIP: NORMAL
GLUCOSE UR QL STRIP: NORMAL
KETONES UR QL STRIP: NORMAL
KETONES UR QL STRIP: NORMAL
LEUKOCYTE ESTERASE URINE, POC: NORMAL
LEUKOCYTE ESTERASE URINE, POC: NORMAL
NITRITE, POC UA: NORMAL
NITRITE, POC UA: NORMAL
PH, POC UA: 6
PH, POC UA: NORMAL
PROTEIN, POC: NORMAL
PROTEIN, POC: NORMAL
SPECIFIC GRAVITY, POC UA: 1.01
SPECIFIC GRAVITY, POC UA: NORMAL
UROBILINOGEN, POC UA: NORMAL
UROBILINOGEN, POC UA: NORMAL

## 2020-02-05 PROCEDURE — 99999 PR PBB SHADOW E&M-EST. PATIENT-LVL II: CPT | Mod: PBBFAC,,, | Performed by: UROLOGY

## 2020-02-05 PROCEDURE — 99214 OFFICE O/P EST MOD 30 MIN: CPT | Mod: S$PBB,,, | Performed by: UROLOGY

## 2020-02-05 PROCEDURE — 81002 URINALYSIS NONAUTO W/O SCOPE: CPT | Mod: PBBFAC | Performed by: UROLOGY

## 2020-02-05 PROCEDURE — 99212 OFFICE O/P EST SF 10 MIN: CPT | Mod: PBBFAC | Performed by: UROLOGY

## 2020-02-05 PROCEDURE — 99999 PR PBB SHADOW E&M-EST. PATIENT-LVL II: ICD-10-PCS | Mod: PBBFAC,,, | Performed by: UROLOGY

## 2020-02-05 PROCEDURE — 99214 PR OFFICE/OUTPT VISIT, EST, LEVL IV, 30-39 MIN: ICD-10-PCS | Mod: S$PBB,,, | Performed by: UROLOGY

## 2020-02-05 NOTE — PROGRESS NOTES
Chief Complaint: ED    HPI:   2/5/20: Fines that he is having some nocturia in the early evening.  Penis feels numb recently.  Constant back pain ruptured something in the lumbar spine.  One erection in last few months.  Took a new SSRI from 8/19 until a few months ago based on these concerns.  4/4/19: Wound healed, a little tender still but much more normal.  3/18/19: Had some discharge from the top of his scrotal incision.  Location is right where the last tie of monocryl would be at the top of the incision.  2/14/19: No pain to speak of after recently vasectomy.  Still having left testalgia and still swollen.  Left cord was explored and no obvious vas was isolated; the structure taken was a small artery.  Unclear if fertility will be achieved.  9/5/18: 37 yo man with 5 children desires no more; referred by Dr. Perez.  No abd/pelvic pain and no exac/rel factors.  No hematuria.  No urolithiasis.  No urinary bother.  No  history.  Normal sexual function.    Allergies:  Penicillins    Medications:  has a current medication list which includes the following prescription(s): diazepam, escitalopram oxalate, and ondansetron.    Review of Systems:  General: No fever, chills, fatigability, or weight loss.  Skin: No rashes, itching, or changes in color or texture of skin.  Chest: Denies SAVAGE, cyanosis, wheezing, cough, and sputum production.  Abdomen: Appetite fine. No weight loss. Denies diarrhea, abdominal pain, hematemesis, or blood in stool.  Musculoskeletal: No joint stiffness or swelling. Denies back pain.  : As above.  All other review of systems negative.    PMH:   has no past medical history on file.    PSH:   has a past surgical history that includes Hernia repair; Vasectomy (Bilateral, 1/31/2019); and Ultrasound guidance (Left, 1/31/2019).    FamHx: family history includes Heart attacks under age 50 in his maternal grandfather; Melanoma in his brother, father, mother, and sister.    SocHx:  reports that he has  been smoking vaping w/o nicotine. He has never used smokeless tobacco. He reports that he does not drink alcohol or use drugs.      Physical Exam:  Vitals:    02/05/20 1001   BP: 132/68     General: A&Ox3, no apparent distress, no deformities  Neck: No masses, normal thyroid  Lungs: normal inspiration, no use of accessory muscles  Heart: normal pulse, no arrhythmias  Abdomen: Soft, NT, ND, no masses, no hernias, no hepatosplenomegaly  Lymphatic: Neck and groin nodes negative  Skin: The skin is warm and dry. No jaundice.  Ext: No c/c/e.  :   4/4/19: scrotal wound fully resolved.  3/18/19: Has some drainage at the top of the incision on the scrotum, two dots draining with a small josué in between not fluctuant; like a stitch seroma  2/14/19: scrotal wound healing well, left testicle swollen still, cord swollen still  9/18: Test desc adela, no abnormalities of epididymus. Penis normal, with normal penile and scrotal skin. Meatus normal.     Labs/Studies: Urinalysis performed in clinic, summary: UA normal    Impression/Plan:   1. Still want to get a lab semen analysis, will order.  2. May need more time off SSRI to improve LUTS/ED.  3. Lumbar MRI ordered, will see back after results in.

## 2020-02-05 NOTE — TELEPHONE ENCOUNTER
This is a mutual pt; Darleen May put in orders for Lumbar MRI but pt states he has not been called for an appt; when the test is done, would you please cc Dr. Flowers as he would like to see the results.  Thanking you in advance.

## 2020-02-07 ENCOUNTER — TELEPHONE (OUTPATIENT)
Dept: OTOLARYNGOLOGY | Facility: CLINIC | Age: 40
End: 2020-02-07

## 2020-02-07 ENCOUNTER — CLINICAL SUPPORT (OUTPATIENT)
Dept: AUDIOLOGY | Facility: CLINIC | Age: 40
End: 2020-02-07
Payer: OTHER GOVERNMENT

## 2020-02-07 DIAGNOSIS — H69.92 ETD (EUSTACHIAN TUBE DYSFUNCTION), LEFT: Primary | ICD-10-CM

## 2020-02-07 DIAGNOSIS — H90.A21 SENSORINEURAL HEARING LOSS (SNHL) OF RIGHT EAR WITH RESTRICTED HEARING OF LEFT EAR: ICD-10-CM

## 2020-02-07 DIAGNOSIS — H90.A32 MIXED CONDUCTIVE AND SENSORINEURAL HEARING LOSS OF LEFT EAR WITH RESTRICTED HEARING OF RIGHT EAR: ICD-10-CM

## 2020-02-07 DIAGNOSIS — H81.11 BPPV (BENIGN PAROXYSMAL POSITIONAL VERTIGO), RIGHT: ICD-10-CM

## 2020-02-07 PROCEDURE — 92537 CALORIC VSTBLR TEST W/REC: CPT | Mod: 26,S$PBB,, | Performed by: AUDIOLOGIST-HEARING AID FITTER

## 2020-02-07 PROCEDURE — 92537 CALORIC VSTBLR TEST W/REC: CPT | Mod: PBBFAC | Performed by: AUDIOLOGIST-HEARING AID FITTER

## 2020-02-07 PROCEDURE — 92537 PR CALORIC VSTBLR TEST W/REC BITHERMAL: ICD-10-PCS | Mod: 26,S$PBB,, | Performed by: AUDIOLOGIST-HEARING AID FITTER

## 2020-02-07 PROCEDURE — 92567 TYMPANOMETRY: CPT | Mod: PBBFAC | Performed by: AUDIOLOGIST-HEARING AID FITTER

## 2020-02-07 PROCEDURE — 92557 COMPREHENSIVE HEARING TEST: CPT | Mod: PBBFAC | Performed by: AUDIOLOGIST-HEARING AID FITTER

## 2020-02-07 PROCEDURE — 92540 PR VESTIBULAR EVAL NYSTAG FOVL&PERPH STIM OSCIL TRACKING: ICD-10-PCS | Mod: 26,S$PBB,, | Performed by: AUDIOLOGIST-HEARING AID FITTER

## 2020-02-07 PROCEDURE — 92540 BASIC VESTIBULAR EVALUATION: CPT | Mod: 26,S$PBB,, | Performed by: AUDIOLOGIST-HEARING AID FITTER

## 2020-02-07 PROCEDURE — 92540 BASIC VESTIBULAR EVALUATION: CPT | Mod: PBBFAC | Performed by: AUDIOLOGIST-HEARING AID FITTER

## 2020-02-07 RX ORDER — PREDNISONE 20 MG/1
TABLET ORAL
Qty: 21 TABLET | Refills: 0 | Status: ON HOLD | OUTPATIENT
Start: 2020-02-07 | End: 2020-03-04

## 2020-02-07 NOTE — Clinical Note
A/VNG for your review. BPPV has resolved. Pt does have left ETD today. I told him about all of the OTC options for alleviating ETD and he will try these. Please f/u if you think anything else should be tried. Thanks!

## 2020-02-07 NOTE — PROGRESS NOTES
Referring provider: Tamiko Milan PA-C    Matthew Graham Blakemore was seen 2020 for an audiological and vestibular evaluation.  Patient was last seen by Tamiko Milan PA-C on 2020 for right BPPV. He received a right Epley maneuver. Today he reports still having some dizziness since the maneuver. He has a hx of hearing loss ([facility], last audio was 2 years ago). He reports having a threshold shift at that time. He has had BPPV off an on for several years and will perform the Epley maneuver on himself at home. Today he notes tinnitus in his right ear and aural pressure in his left ear. He wears hearing protection in noise when available.     Audiology Report:  Results reveal a mild mixed hearing loss 250-8000 Hz for the right ear, and  mild sensorineural hearing loss 250-8000 Hz for the left ear.   Speech Reception Thresholds were  20 dBHL for the right ear and 20 dBHL for the left ear.   Word recognition scores were excellent for the right ear and excellent for the left ear.   Tympanograms were Type A, normal for the right ear and Type C, abnormal for the left ear.        Videonystagmography Report (VNG):  Oculomotor function tests (sinusoidal tracking, saccade, optokinetic) were normal and symmetric.  Spontaneous test was absent for nystagmus.  Gaze test was absent for nystagmus.  Head-shake test was absent for after head-shake nystagmus.  Static Positional test:   Supine: Absent   Head Right: Absent   Head Left:3 d//s UB nystagmus that suppressed with fixation. (Clincially insignificant)  Carissa-Hallpike Right was negative for BPPV.  Nicholls-Hallpike Left was negative for BPPV.  Bi-thermal caloric test was Normal.  Fixation suppression following caloric irrigations was normal. Patient did not attend to fixation tasks well by the end of the examination. The following values were obtained:  Unilateral weakness (UW): 9% left ear  Directional preponderance (DP): 9% left beating  RC:6 d/s   d/s  RW:6  d/s  LW: 6 d/s    Summary: Normal VNG. Normal peripheral and central vestibular function. BPPV has resolved. Patient was told he can return to normal activities.     Recommendations:  1. ENT f/u due to left ETD.  2. Hearing protection in noise.  3. Annual audiograms.      Patient was counseled on the above findings.  Tracings are to be scanned.

## 2020-02-07 NOTE — TELEPHONE ENCOUNTER
----- Message from Tamiko Milan PA-C sent at 2/7/2020 11:14 AM CST -----  Please let pt know I reviewed his hearing test results which did show eustachian tube dysfunction/ fluid on L ear. I sent an Rx for a prednisone taper for this and would also advise he use Flonase twice daily. We should plan to recheck in 2 weeks w/ tymps prior to appt, thank you!

## 2020-02-11 ENCOUNTER — TELEPHONE (OUTPATIENT)
Dept: UROLOGY | Facility: CLINIC | Age: 40
End: 2020-02-11

## 2020-02-11 ENCOUNTER — HOSPITAL ENCOUNTER (OUTPATIENT)
Dept: RADIOLOGY | Facility: HOSPITAL | Age: 40
Discharge: HOME | End: 2020-02-11
Attending: PHYSICIAN ASSISTANT
Payer: OTHER GOVERNMENT

## 2020-02-11 DIAGNOSIS — M54.50 LOW BACK PAIN, NON-SPECIFIC: ICD-10-CM

## 2020-02-11 PROCEDURE — 72158 MRI LUMBAR SPINE W/O & W/DYE: CPT | Mod: TC,PO

## 2020-02-11 PROCEDURE — 72158 MRI LUMBAR SPINE W/O & W/DYE: CPT | Mod: 26,,, | Performed by: RADIOLOGY

## 2020-02-11 PROCEDURE — 25500020 PHARM REV CODE 255: Mod: PO | Performed by: PHYSICIAN ASSISTANT

## 2020-02-11 PROCEDURE — A9585 GADOBUTROL INJECTION: HCPCS | Mod: PO | Performed by: PHYSICIAN ASSISTANT

## 2020-02-11 PROCEDURE — 72158 MRI LUMBAR SPINE W WO CONTRAST: ICD-10-PCS | Mod: 26,,, | Performed by: RADIOLOGY

## 2020-02-11 RX ORDER — GADOBUTROL 604.72 MG/ML
10 INJECTION INTRAVENOUS
Status: COMPLETED | OUTPATIENT
Start: 2020-02-11 | End: 2020-02-11

## 2020-02-11 RX ADMIN — GADOBUTROL 10 ML: 604.72 INJECTION INTRAVENOUS at 10:02

## 2020-02-11 NOTE — TELEPHONE ENCOUNTER
Contacted pt and informed him that Dr. Flowers said that the MRI showed some lower back problems and suggested that he see Dr. Oakes, neurosurgeon.  Pt verbalized understanding and appt scheduled.

## 2020-02-12 ENCOUNTER — PATIENT MESSAGE (OUTPATIENT)
Dept: INTERNAL MEDICINE | Facility: CLINIC | Age: 40
End: 2020-02-12

## 2020-02-12 RX ORDER — BACLOFEN 10 MG/1
10 TABLET ORAL 2 TIMES DAILY PRN
Qty: 30 TABLET | Refills: 0 | Status: SHIPPED | OUTPATIENT
Start: 2020-02-12 | End: 2020-02-24

## 2020-02-12 RX ORDER — KETOROLAC TROMETHAMINE 10 MG/1
10 TABLET, FILM COATED ORAL EVERY 6 HOURS PRN
Qty: 30 TABLET | Refills: 0 | Status: SHIPPED | OUTPATIENT
Start: 2020-02-12 | End: 2020-02-19

## 2020-02-21 ENCOUNTER — OFFICE VISIT (OUTPATIENT)
Dept: NEUROSURGERY | Facility: CLINIC | Age: 40
End: 2020-02-21
Payer: OTHER GOVERNMENT

## 2020-02-21 ENCOUNTER — PATIENT MESSAGE (OUTPATIENT)
Dept: INTERNAL MEDICINE | Facility: CLINIC | Age: 40
End: 2020-02-21

## 2020-02-21 VITALS
WEIGHT: 229.94 LBS | SYSTOLIC BLOOD PRESSURE: 120 MMHG | BODY MASS INDEX: 31.14 KG/M2 | HEIGHT: 72 IN | RESPIRATION RATE: 18 BRPM | DIASTOLIC BLOOD PRESSURE: 80 MMHG | HEART RATE: 66 BPM

## 2020-02-21 DIAGNOSIS — M54.16 LUMBAR RADICULOPATHY: ICD-10-CM

## 2020-02-21 DIAGNOSIS — M54.50 LOW BACK PAIN, UNSPECIFIED BACK PAIN LATERALITY, UNSPECIFIED CHRONICITY, UNSPECIFIED WHETHER SCIATICA PRESENT: Primary | ICD-10-CM

## 2020-02-21 DIAGNOSIS — M51.36 DDD (DEGENERATIVE DISC DISEASE), LUMBAR: Primary | ICD-10-CM

## 2020-02-21 DIAGNOSIS — M51.36 DEGENERATIVE DISC DISEASE, LUMBAR: ICD-10-CM

## 2020-02-21 PROCEDURE — 99204 PR OFFICE/OUTPT VISIT, NEW, LEVL IV, 45-59 MIN: ICD-10-PCS | Mod: S$PBB,,, | Performed by: NEUROLOGICAL SURGERY

## 2020-02-21 PROCEDURE — 99999 PR PBB SHADOW E&M-EST. PATIENT-LVL III: CPT | Mod: PBBFAC,,, | Performed by: NEUROLOGICAL SURGERY

## 2020-02-21 PROCEDURE — 99999 PR PBB SHADOW E&M-EST. PATIENT-LVL III: ICD-10-PCS | Mod: PBBFAC,,, | Performed by: NEUROLOGICAL SURGERY

## 2020-02-21 PROCEDURE — 99213 OFFICE O/P EST LOW 20 MIN: CPT | Mod: PBBFAC,PO | Performed by: NEUROLOGICAL SURGERY

## 2020-02-21 PROCEDURE — 99204 OFFICE O/P NEW MOD 45 MIN: CPT | Mod: S$PBB,,, | Performed by: NEUROLOGICAL SURGERY

## 2020-02-21 NOTE — PROGRESS NOTES
Subjective:      Patient ID: Matthew Graham Blakemore is a 39 y.o. male.    Chief Complaint: Lumbar Spine Pain (L-Spine) (LBP ) and Extremity Weakness (Bilateral legs/feet)    Here for LBP and L LLE N/T   1.5 years in duration   Has had hx of LBP since injury in 2004   Ss mainly In LLE   But also has R LE ss into big toe   hasn't been able to get an erection since January   Has had PT for this and uses a TENS unit which helps    has DF weakness on L which he says is new   notices it going up stairs   No pain management or injections    Gets positional headaches as well as dizziness          Review of Systems   Constitutional: Negative for activity change, appetite change and chills.   HENT: Negative for hearing loss, sore throat and tinnitus.    Eyes: Negative for pain, discharge and itching.   Cardiovascular: Negative for chest pain.   Gastrointestinal: Negative for abdominal pain.   Endocrine: Negative for cold intolerance and heat intolerance.   Genitourinary: Negative for difficulty urinating and dysuria.   Musculoskeletal: Positive for back pain and gait problem.   Allergic/Immunologic: Negative for environmental allergies.   Neurological: Positive for weakness. Negative for dizziness, tremors, light-headedness and headaches.   Hematological: Negative for adenopathy.   Psychiatric/Behavioral: Negative for agitation, behavioral problems and confusion.         Objective:       Neurosurgery Physical Exam  Ortho/SPM Exam    Nursing note and vitals reviewed  Gen:Oriented to person, place, and time.             Appears stated age   Skin: no rashes or lesions identified   Head:Normocephalic and atraumatic.  Nose: Nose normal.    Eyes: EOM are normal. Pupils are equal, round, and reactive to light.   Neck: Neck supple. No masses or lesions palpated  Cardiovascular: Intact distal pulses.    Abdominal: Soft.   Neurological: Alert and oriented to person, place, and time.  No cranial nerve deficit.  Coordination normal.  Normal muscle tone  Psychiatric: Normal mood and affect. Behavior is normal.      Back:  None  Paraspinal muscle spasms   None  Pain with flexion and extention   WNL  Range of motion    Neg  Straight leg raise     Motor   Right Right Left Left  Level Group   5  5  L2 Hip flexor (Psoas)   5  5  L3 Leg extension (Quads)   5  5  L4 Dorsiflexion & foot inversion (Tibialis Anterior)   5   4 L5 Great toe extension ( EHL)   5   4 S1 Foot eversion (Gastroc, PL & PB)     Sensation  NL Decreased (R/L/BL) Level Sensation    X  L2 Anterio-medial thigh   X  L3 Medial thigh around knee   X  L4 Medial foot   X Diminished light touch on the left L5 Dorsum foot   X Diminished light touch on the left S1 Lateral foot     Reflex  2+  Patellar tendon (L4)   2+  Achilles tendon (S1)       MRI lumbar spine  L4-L5: Broad-based posterior disc bulge with superimposed central/left paracentral disc protrusion which indents the anterolateral left side of the thecal sac.  Resultant moderate left foraminal narrowing.  No significant central canal stenosis.    L5-S1: Broad-based posterior disc bulge with superimposed large left paracentral/foraminal disc protrusion and left paracentral annular tear.  Resultant moderate to marked left foraminal narrowing.  Indentation of the anterolateral left side of the thecal sac without significant central canal stenosis.      Assessment:     1. Low back pain, unspecified back pain laterality, unspecified chronicity, unspecified whether sciatica present    2. Degenerative disc disease, lumbar    3. Lumbar radiculopathy      Plan:     Low back pain, unspecified back pain laterality, unspecified chronicity, unspecified whether sciatica present  -     Ambulatory referral/consult to Pain Clinic; Future; Expected date: 02/28/2020    Degenerative disc disease, lumbar    Lumbar radiculopathy     Patient has degenerative disc disease with pain in the lower back down the lower extremity  Will send for consultation to  the Pain Service to see if there perform an epidural steroid injection  Follow up in clinic after the injection is complete    Thank you for the referral   Please call with any questions    Francisco Oakes MD  Neurosurgery     Disclaimer: This note was prepared using a voice recognition system and is likely to have sound alike errors within the text.

## 2020-02-24 ENCOUNTER — OFFICE VISIT (OUTPATIENT)
Dept: PAIN MEDICINE | Facility: CLINIC | Age: 40
End: 2020-02-24
Payer: OTHER GOVERNMENT

## 2020-02-24 VITALS
BODY MASS INDEX: 31.03 KG/M2 | HEIGHT: 72 IN | WEIGHT: 229.06 LBS | HEART RATE: 70 BPM | SYSTOLIC BLOOD PRESSURE: 117 MMHG | DIASTOLIC BLOOD PRESSURE: 87 MMHG

## 2020-02-24 DIAGNOSIS — M47.816 LUMBAR SPONDYLOSIS: ICD-10-CM

## 2020-02-24 DIAGNOSIS — R29.898 LEFT LEG WEAKNESS: ICD-10-CM

## 2020-02-24 DIAGNOSIS — M54.50 LOW BACK PAIN, UNSPECIFIED BACK PAIN LATERALITY, UNSPECIFIED CHRONICITY, UNSPECIFIED WHETHER SCIATICA PRESENT: ICD-10-CM

## 2020-02-24 DIAGNOSIS — M54.16 LUMBAR RADICULOPATHY: Primary | ICD-10-CM

## 2020-02-24 PROCEDURE — 99244 PR OFFICE CONSULTATION,LEVEL IV: ICD-10-PCS | Mod: S$PBB,,, | Performed by: ANESTHESIOLOGY

## 2020-02-24 PROCEDURE — 99213 OFFICE O/P EST LOW 20 MIN: CPT | Mod: PBBFAC | Performed by: ANESTHESIOLOGY

## 2020-02-24 PROCEDURE — 99999 PR PBB SHADOW E&M-EST. PATIENT-LVL III: CPT | Mod: PBBFAC,,, | Performed by: ANESTHESIOLOGY

## 2020-02-24 PROCEDURE — 99999 PR PBB SHADOW E&M-EST. PATIENT-LVL III: ICD-10-PCS | Mod: PBBFAC,,, | Performed by: ANESTHESIOLOGY

## 2020-02-24 PROCEDURE — 99244 OFF/OP CNSLTJ NEW/EST MOD 40: CPT | Mod: S$PBB,,, | Performed by: ANESTHESIOLOGY

## 2020-02-24 RX ORDER — BACLOFEN 10 MG/1
TABLET ORAL
Qty: 30 TABLET | Refills: 0 | Status: ON HOLD | OUTPATIENT
Start: 2020-02-24 | End: 2020-03-04

## 2020-02-24 NOTE — H&P (VIEW-ONLY)
"Chief Pain Complaint:  Low Back Pain   Left Leg Numbness     History of Present Illness:   Matthew Graham Blakemore is a 39 y.o. male  who is presenting with a chief complaint of Low-back Pain and Leg Pain  . The patient began experiencing this problem insidiously, and the pain has been gradually worsening over the past 12 month(s). The pain is described as throbbing, shooting, burning and electrical and is located in the bilateral lumbar spine L>R. Pain is intermittent and lasts hours. The pain radiates to  left leg L4, L5 distribution . The patient rates his pain a 2 out of ten and interferes with activities of daily living a 2 out of ten. Numbness is the major concern. Pain is exacerbated by flexion of the lumbar spine, and is improved by rest. Patient reports no prior trauma, no prior spinal surgery     - pertinent negatives: No fever, No chills, No weight loss, No bladder dysfunction, No bowel dysfunction, No saddle anesthesia  - pertinent positives: left leg weakness . Some urge incontinence, inability to maintain an erection.   - medications, other therapies tried (physical therapy, injections):     >> NSAIDs, Tylenol, oxycodone and flexeril    >> Has previously undergone Physical Therapy    >> Has NOT previously undergone spinal injection/s      Imaging / Labs / Studies (reviewed on 2/24/2020):    Results for orders placed during the hospital encounter of 02/11/20   MRI Lumbar Spine W WO Contrast    Narrative EXAMINATION:  MRI LUMBAR SPINE W WO CONTRAST    CLINICAL HISTORY:  Low back pain, prior surgery, new or progressive sx;urinary symptoms along with "numbness in penis" - had old Lumbar injury and had bladder issues then;  Low back pain    TECHNIQUE:  Multiplanar, multisequence MR images were acquired from the thoracolumbar junction to the sacrum before and after administration of 10 cc Gadavist intravenous contrast material.    COMPARISON:  None.    FINDINGS:  Alignment: Normal.    Vertebrae: Normal " marrow signal. No fracture.    Discs: Degenerative disc space narrowing and desiccation at L4-5 and L5-S1.    Cord: Normal.  Conus terminates at L1.    Degenerative findings:    T12-L1: No significant foraminal narrowing or central canal stenosis.    L1-L2: No significant foraminal narrowing or central canal stenosis.    L2-L3: No significant foraminal narrowing or central canal stenosis.    L3-L4: No significant foraminal narrowing or central canal stenosis.    L4-L5: Broad-based posterior disc bulge with superimposed central/left paracentral disc protrusion which indents the anterolateral left side of the thecal sac.  Resultant moderate left foraminal narrowing.  No significant central canal stenosis.    L5-S1: Broad-based posterior disc bulge with superimposed large left paracentral/foraminal disc protrusion and left paracentral annular tear.  Resultant moderate to marked left foraminal narrowing.  Indentation of the anterolateral left side of the thecal sac without significant central canal stenosis.    Paraspinal muscles & soft tissues: Unremarkable.  No abnormal enhancement.      Impression Multilevel degenerative disc disease involving L4-5 and L5-S1 levels as noted.      Electronically signed by: FLORA Mooney MD  Date:    02/11/2020  Time:    11:16     Review of Systems:  CONSTITUTIONAL: patient denies any fever, chills, or weight loss  SKIN: patient denies any rash or itching  RESPIRATORY: patient denies having any shortness of breath  GASTROINTESTINAL: patient denies having any diarrhea, constipation, or bowel incontinence  GENITOURINARY: patient denies having any abnormal bladder function    MUSCULOSKELETAL:  - patient complains of the above noted pain/s (see chief pain complaint)    NEUROLOGICAL:   - pain as above  - strength in Lower extremities is decreased, on the LEFT  - sensation in Lower extremities is intact, BILATERALLY  - patient denies any loss of bowel or bladder control      PSYCHIATRIC:  patient denies any change in mood    Other:  All other systems reviewed and are negative      Physical Exam:  /87   Pulse 70   Ht 6' (1.829 m)   Wt 103.9 kg (229 lb 0.9 oz)   BMI 31.07 kg/m²  (reviewed on 2/24/2020)  General: Alert and oriented, in no apparent distress.  Gait: normal gait.  Skin: No rashes, No discoloration, No obvious lesions  HEENT: Normocephalic, atraumatic. Pupils equal and round.  Cardiovascular: Regular rate and rhythm , no significant peripheral edema present  Respiratory: Without audible wheezing, without use of accessory muscles of respiration.    Musculoskeletal:    Cervical Spine    - Pain on flexion of cervical spine Absent  - Spurling's Test:  Absent    - Pain on extension of cervical spine Absent  - TTP over the cervical facet joints Absent  - Cervical facet loading Absent      Lumbar Spine    - Pain on flexion of lumbar spine Present  - Straight Leg Raise:  Present    - Pain on extension of lumbar spine Absent  - TTP over the lumbar facet joints Absent  - Lumbar facet loading Absent    -Pain on palpation over the SI joint  Absent  - RUTH: Absent      Neuro:    Strength:  UE R/L: D: 5/5; B: 5/5; T: 5/5; WF: 5/5; WE: 5/5; IO: 5/5;  LE R/L: HF: 5/4, HE: 5/5, KF: 5/5; KE: 5/4; FE: 5/5; FF: 5/5    Extremity Reflexes: Brisk and symmetric throughout.      Extremity Sensory: Sensation to pinprick and temperature symmetric. Proprioception intact.      Psych:  Mood and affect is appropriate      Assessment:    Matthew Graham Blakemore is a 39 y.o. year old male who is presenting with     Encounter Diagnoses   Name Primary?    Low back pain, unspecified back pain laterality, unspecified chronicity, unspecified whether sciatica present     Lumbar spondylosis     Lumbar radiculopathy Yes       Plan:    1. Interventional: Schedule patient Left L4-5, L5-S1 TFESI with RN IV sedation     2. Pharmacologic: Consider Gabapetnin but patient wants to hold off for now.     3. Rehabilitative:  Patient already going to PT.     4. Diagnostic: Lumbar MRI reviewed.     5.  Follow up: Post Injection.    20  minutes were spent in this encounter with more than 50% of the time used for counseling and review of the plan.  Imaging / studies reviewed, detailed above.  I discussed in detail the risks, benefits, and alternatives to any and all potential treatment options.  All questions and concerns were fully addressed today in clinic. Medical decision making moderate.    Thank you for the opportunity to assist in the care of this patient.    Best wishes,    Signed:    Anjel Nugent MD          Disclaimer:  This note may have been prepared using voice recognition software, it may have not been extensively proofed, as such there could be errors within the text such as sound alike errors.

## 2020-02-24 NOTE — LETTER
February 24, 2020      Francisco Oakes MD  [address] [facility] [address]           [facility]  [address] [address] [facility] [address]  Phone: [phone]  Fax: [phone]          Patient: Matthew Graham Blakemore   MR Number: [id number]   YOB: 1980   Date of Visit: 2/24/2020       Dear Dr. Francisco Oakes:    Thank you for referring Matthew Blakemore to me for evaluation. Attached you will find relevant portions of my assessment and plan of care.    If you have questions, please do not hesitate to call me. I look forward to following Matthew Blakemore along with you.    Sincerely,    Anjel Nugent MD    Enclosure  CC:  No Recipients    If you would like to receive this communication electronically, please contact [email or web address] or [phone] to request more information on [facility] access.    For providers and/or their staff who would like to refer a patient to [facility], please contact us through our one-stop-shop provider referral line, [facility], at [phone].    If you feel you have received this communication in error or would no longer like to receive these types of communications, please e-mail [email or web address]

## 2020-02-25 ENCOUNTER — PATIENT MESSAGE (OUTPATIENT)
Dept: PAIN MEDICINE | Facility: HOSPITAL | Age: 40
End: 2020-02-25

## 2020-02-25 NOTE — PROGRESS NOTES
Subjective:   Patient: Matthew Graham Blakemore [id number], :1980   Visit date:2020 12:39 PM    Chief Complaint:  Follow-up    HPI:  Richard is a 39 y.o. male who is here for follow-up. He was last here on 20 for L MILAGRO tx with a prednisone taper and Flonase. VNG at time was wnl. He rtc on 20 and reported persistent pressure AS. This is not painful and does not bother him. He has a hx of BPPV, mostly R sided, stated he performed Epleys at home and this resolves itself. Episodes never last over one day. No episodes since last OV. No new issues or complaints.         Review of Systems:  -     Allergic/Immunologic: is allergic to penicillins..  -     Constitutional: Current temp: 96.7 °F (35.9 °C) (Tympanic)    His meds, allergies, medical, surgical, social & family histories were reviewed & updated:  -     He has a current medication list which includes the following prescription(s): baclofen, diazepam, escitalopram oxalate, ondansetron, and prednisone.  -     He  has no past medical history on file.   -     He does not have any pertinent problems on file.   -     He  has a past surgical history that includes Hernia repair; Vasectomy (Bilateral, 2019); and Ultrasound guidance (Left, 2019).  -     He  reports that he has been smoking vaping w/o nicotine. He has never used smokeless tobacco. He reports that he does not drink alcohol or use drugs.  -     His family history includes Heart attacks under age 50 in his maternal grandfather; Melanoma in his brother, father, mother, and sister.  -     He is allergic to penicillins.    Objective:     Physical Exam:  Vitals:  /79   Pulse 69   Temp 96.7 °F (35.9 °C) (Tympanic)   Wt 103.8 kg (228 lb 13.4 oz)   BMI 31.04 kg/m²   Appearance:  Well-developed, well-nourished.  Communication:  Able to communicate, no hoarseness.  Head & Face:  Normocephalic, atraumatic, no sinus tenderness, normal facial strength.  Eyes:  Extraocular motions  intact.  Ears:  L TM with moderate bulge, clear, intact. R TM WNL.   Nose:  No masses/lesions of external nose, nasal mucosa, septum, and turbinates were within normal limits.  Mouth:  No mass/lesion of lips, teeth, gums, hard/soft palate, tongue, tonsils, or oropharynx.  Neck & Lymphatics:  No cervical lymphadenopathy, no neck mass/crepitus/ asymmetry, trachea is midline, no thyroid enlargement/tenderness/mass.  Neuro/Psych: Alert with normal mood and affect.   Abdominal: Normal appearance.   Respiration/Chest:  Symmetric expansion during respiration, normal respiratory effort.  Skin:  Warm and intact  Cardiovascular:  No peripheral vascular edema or varicosities.      Tympanometry revealed Type A, normal in the right ear, and Type B, abnormal, flat in the left ear.      Right Ear:  0.6ml@ -64daPa, with ear canal volume of:1.3ml     Left Ear: No peak, with ear canal volume of: 1.1ml     Patient was counseled with results.    Assessment & Plan:   Richard was seen today for follow-up.    Diagnoses and all orders for this visit:    Fluid level behind tympanic membrane of left ear    Dysfunction of left eustachian tube      Advised pt to start Flonase and give this 3-4 weeks, offered to recheck tymps/exam, he wishes to contact us for f/u.   Rtc prn dizzy episodes, resolved at this time.         Tamiko Milan PA-C  [facility]   [facility]  [address]  [place] [address]  P: [phone]  F: [phone]

## 2020-02-26 NOTE — PRE-PROCEDURE INSTRUCTIONS
Spoke with  patient     regarding procedure scheduled on 3/4/2020  Arrival time not yet approved, will update   Has patient been sick with fever or on antibiotics within the last 7 days? no  Has patient received a vaccination within the last 7 days? no  Has the patient stopped all medications as directed? Ibuprofen last dose on 2/25/2020  Does patient have a pacemaker and or defibrillator? no  Does the patient have a ride to and from procedure and someone reliable to remain with patient? Yes, wife magda  Is the patient diabetic? no  Does the patient have sleep apnea? Or use O2 at home? No no  Is the patient receiving sedation? yes  Is the patient instructed to remain NPO beginning at midnight the night before their procedure? yes  Procedure location confirmed with patient? yes  Travel screening: negative

## 2020-02-27 ENCOUNTER — CLINICAL SUPPORT (OUTPATIENT)
Dept: AUDIOLOGY | Facility: CLINIC | Age: 40
End: 2020-02-27
Payer: OTHER GOVERNMENT

## 2020-02-27 ENCOUNTER — OFFICE VISIT (OUTPATIENT)
Dept: OTOLARYNGOLOGY | Facility: CLINIC | Age: 40
End: 2020-02-27
Payer: OTHER GOVERNMENT

## 2020-02-27 VITALS
SYSTOLIC BLOOD PRESSURE: 120 MMHG | DIASTOLIC BLOOD PRESSURE: 79 MMHG | WEIGHT: 228.81 LBS | TEMPERATURE: 97 F | BODY MASS INDEX: 31.04 KG/M2 | HEART RATE: 69 BPM

## 2020-02-27 DIAGNOSIS — H65.92 FLUID LEVEL BEHIND TYMPANIC MEMBRANE OF LEFT EAR: Primary | ICD-10-CM

## 2020-02-27 DIAGNOSIS — H69.92 ETD (EUSTACHIAN TUBE DYSFUNCTION), LEFT: Primary | ICD-10-CM

## 2020-02-27 DIAGNOSIS — H69.92 DYSFUNCTION OF LEFT EUSTACHIAN TUBE: ICD-10-CM

## 2020-02-27 PROCEDURE — 99999 PR PBB SHADOW E&M-EST. PATIENT-LVL III: CPT | Mod: PBBFAC,,, | Performed by: PHYSICIAN ASSISTANT

## 2020-02-27 PROCEDURE — 92567 TYMPANOMETRY: CPT | Mod: PBBFAC | Performed by: AUDIOLOGIST-HEARING AID FITTER

## 2020-02-27 PROCEDURE — 99213 OFFICE O/P EST LOW 20 MIN: CPT | Mod: PBBFAC,25 | Performed by: PHYSICIAN ASSISTANT

## 2020-02-27 PROCEDURE — 99213 PR OFFICE/OUTPT VISIT, EST, LEVL III, 20-29 MIN: ICD-10-PCS | Mod: S$PBB,,, | Performed by: PHYSICIAN ASSISTANT

## 2020-02-27 PROCEDURE — 99213 OFFICE O/P EST LOW 20 MIN: CPT | Mod: S$PBB,,, | Performed by: PHYSICIAN ASSISTANT

## 2020-02-27 PROCEDURE — 99999 PR PBB SHADOW E&M-EST. PATIENT-LVL III: ICD-10-PCS | Mod: PBBFAC,,, | Performed by: PHYSICIAN ASSISTANT

## 2020-02-27 NOTE — PROGRESS NOTES
Tympanometry revealed Type A, normal in the right ear, and Type B, abnormal, flat in the left ear.     Right Ear:  0.6ml@ -64daPa, with ear canal volume of:1.3ml    Left Ear: No peak, with ear canal volume of: 1.1ml    Patient was counseled with results.

## 2020-03-02 ENCOUNTER — OFFICE VISIT (OUTPATIENT)
Dept: INTERNAL MEDICINE | Facility: CLINIC | Age: 40
End: 2020-03-02
Payer: OTHER GOVERNMENT

## 2020-03-02 ENCOUNTER — PATIENT MESSAGE (OUTPATIENT)
Dept: PULMONOLOGY | Facility: CLINIC | Age: 40
End: 2020-03-02

## 2020-03-02 VITALS
WEIGHT: 228.63 LBS | DIASTOLIC BLOOD PRESSURE: 84 MMHG | SYSTOLIC BLOOD PRESSURE: 126 MMHG | TEMPERATURE: 98 F | HEIGHT: 72 IN | HEART RATE: 88 BPM | BODY MASS INDEX: 30.97 KG/M2

## 2020-03-02 DIAGNOSIS — R29.818 SUSPECTED SLEEP APNEA: ICD-10-CM

## 2020-03-02 DIAGNOSIS — M54.16 LUMBAR RADICULOPATHY: Primary | ICD-10-CM

## 2020-03-02 PROCEDURE — 99214 OFFICE O/P EST MOD 30 MIN: CPT | Mod: PBBFAC,PO | Performed by: PHYSICIAN ASSISTANT

## 2020-03-02 PROCEDURE — 99213 PR OFFICE/OUTPT VISIT, EST, LEVL III, 20-29 MIN: ICD-10-PCS | Mod: S$PBB,,, | Performed by: PHYSICIAN ASSISTANT

## 2020-03-02 PROCEDURE — 99999 PR PBB SHADOW E&M-EST. PATIENT-LVL IV: ICD-10-PCS | Mod: PBBFAC,,, | Performed by: PHYSICIAN ASSISTANT

## 2020-03-02 PROCEDURE — 99999 PR PBB SHADOW E&M-EST. PATIENT-LVL IV: CPT | Mod: PBBFAC,,, | Performed by: PHYSICIAN ASSISTANT

## 2020-03-02 PROCEDURE — 99213 OFFICE O/P EST LOW 20 MIN: CPT | Mod: S$PBB,,, | Performed by: PHYSICIAN ASSISTANT

## 2020-03-02 NOTE — LETTER
March 2, 2020    Matthew Graham Blakemore  [address] [place] [address]             [facility]  Internal Medicine  [address]  [place] [address]  Phone: [phone]  Fax: [phone]   March 2, 2020     Patient: Matthew Graham Blakemore   YOB: 1980   Date of Visit: 3/2/2020       To Whom it May Concern:    Matthew Blakemore was seen in my clinic on 3/2/2020. He is in need of a standing desk due to a medical issue that is worsened with sitting position. Please provide this for our patient and let us know if you have any further questions.         If you have any questions or concerns, please don't hesitate to call.    Sincerely,         SARAY Garza

## 2020-03-04 ENCOUNTER — HOSPITAL ENCOUNTER (OUTPATIENT)
Facility: HOSPITAL | Age: 40
Discharge: HOME | End: 2020-03-04
Attending: ANESTHESIOLOGY | Admitting: ANESTHESIOLOGY
Payer: OTHER GOVERNMENT

## 2020-03-04 VITALS
TEMPERATURE: 99 F | DIASTOLIC BLOOD PRESSURE: 69 MMHG | OXYGEN SATURATION: 98 % | HEIGHT: 71 IN | RESPIRATION RATE: 18 BRPM | HEART RATE: 79 BPM | WEIGHT: 218.25 LBS | BODY MASS INDEX: 30.56 KG/M2 | SYSTOLIC BLOOD PRESSURE: 110 MMHG

## 2020-03-04 DIAGNOSIS — M54.16 LUMBAR RADICULOPATHY: Primary | ICD-10-CM

## 2020-03-04 PROCEDURE — 64483 PR EPIDURAL INJ, ANES/STEROID, TRANSFORAMINAL, LUMB/SACR, SNGL LEVL: ICD-10-PCS | Mod: LT,,, | Performed by: ANESTHESIOLOGY

## 2020-03-04 PROCEDURE — 99152 MOD SED SAME PHYS/QHP 5/>YRS: CPT | Mod: ,,, | Performed by: ANESTHESIOLOGY

## 2020-03-04 PROCEDURE — 64484 NJX AA&/STRD TFRM EPI L/S EA: CPT

## 2020-03-04 PROCEDURE — 25000003 PHARM REV CODE 250: Performed by: ANESTHESIOLOGY

## 2020-03-04 PROCEDURE — 64484 NJX AA&/STRD TFRM EPI L/S EA: CPT | Performed by: ANESTHESIOLOGY

## 2020-03-04 PROCEDURE — 64483 NJX AA&/STRD TFRM EPI L/S 1: CPT | Mod: LT,,, | Performed by: ANESTHESIOLOGY

## 2020-03-04 PROCEDURE — 63600175 PHARM REV CODE 636 W HCPCS: Performed by: ANESTHESIOLOGY

## 2020-03-04 PROCEDURE — 25500020 PHARM REV CODE 255: Performed by: ANESTHESIOLOGY

## 2020-03-04 PROCEDURE — 99152 PR MOD CONSCIOUS SEDATION, SAME PHYS, 5+ YRS, FIRST 15 MIN: ICD-10-PCS | Mod: ,,, | Performed by: ANESTHESIOLOGY

## 2020-03-04 PROCEDURE — 64484 PRA INJECT ANES/STEROID FORAMEN LUMBAR/SACRAL W IMG GUIDE ,EA ADD LEVEL: ICD-10-PCS | Mod: LT,,, | Performed by: ANESTHESIOLOGY

## 2020-03-04 PROCEDURE — 64484 NJX AA&/STRD TFRM EPI L/S EA: CPT | Mod: LT,,, | Performed by: ANESTHESIOLOGY

## 2020-03-04 PROCEDURE — 64483 NJX AA&/STRD TFRM EPI L/S 1: CPT | Performed by: ANESTHESIOLOGY

## 2020-03-04 PROCEDURE — 64483 NJX AA&/STRD TFRM EPI L/S 1: CPT

## 2020-03-04 PROCEDURE — 99152 MOD SED SAME PHYS/QHP 5/>YRS: CPT | Performed by: ANESTHESIOLOGY

## 2020-03-04 RX ORDER — MIDAZOLAM HYDROCHLORIDE 1 MG/ML
INJECTION, SOLUTION INTRAMUSCULAR; INTRAVENOUS
Status: DISCONTINUED | OUTPATIENT
Start: 2020-03-04 | End: 2020-03-04 | Stop reason: HOSPADM

## 2020-03-04 RX ORDER — FENTANYL CITRATE 50 UG/ML
INJECTION, SOLUTION INTRAMUSCULAR; INTRAVENOUS
Status: DISCONTINUED | OUTPATIENT
Start: 2020-03-04 | End: 2020-03-04 | Stop reason: HOSPADM

## 2020-03-04 RX ORDER — DEXAMETHASONE SODIUM PHOSPHATE 10 MG/ML
INJECTION INTRAMUSCULAR; INTRAVENOUS
Status: DISCONTINUED | OUTPATIENT
Start: 2020-03-04 | End: 2020-03-04 | Stop reason: HOSPADM

## 2020-03-04 RX ORDER — ACETAMINOPHEN 325 MG/1
500 TABLET ORAL EVERY 6 HOURS PRN
COMMUNITY
End: 2022-01-05 | Stop reason: ALTCHOICE

## 2020-03-04 RX ORDER — LIDOCAINE HYDROCHLORIDE 10 MG/ML
INJECTION, SOLUTION EPIDURAL; INFILTRATION; INTRACAUDAL; PERINEURAL
Status: DISCONTINUED | OUTPATIENT
Start: 2020-03-04 | End: 2020-03-04 | Stop reason: HOSPADM

## 2020-03-04 NOTE — DISCHARGE SUMMARY
[facility]  Discharge Note       Description of Procedure: Left L4-5, L5-S1 Lumbar Transforaminal Epidural Steroid Injection under Fluoroscopic Guidance    Procedure Date: 3/4/2020    Admit Date: 3/4/2020  Discharge Date: 3/4/2020     Attending Physician: Raffi Hobbs   Discharge Provider: Raffi Hobbs    Preoperative Diagnosis: Lumbar Spondylosis, Lumbar Radiculopathy     Postoperative Diagnosis: as above, same as preoperative diagnosis    Discharged Condition: Stable    Hospital Course: Patient was admitted for an outpatient procedure. The procedure was tolerated well with no complications.    Final Diagnoses: Same as principal problem.    Disposition: Home, self-care.    Follow up/Patient Instructions:  Follow-up in clinic in 2-3 weeks.    Medications: No medications were prescribed today. The patient was advised to resume normal medication regimen without change.  Specific information was provided regarding restarting any anticoagulant/s.    Discharge Procedure Orders (must include Diet, Follow-up, Activity):  Light activity for the remainder of the day, resume normal activity tomorrow. Resume normal diet. Follow-up in clinic in 2-3 weeks.

## 2020-03-04 NOTE — DISCHARGE INSTRUCTIONS

## 2020-03-04 NOTE — PROGRESS NOTES
Subjective:       Patient ID: Matthew Graham Blakemore is a 39 y.o. male.    Chief Complaint: Letter for School/Work    HPI  Patient comes in today for back issues     Having a hard time sitting due to numbness/tingling in legs  Has back issues     Needing a standing desk to relieve symptoms     He is seeing pain management, doing ART soon for relief.     Also has suspected sleep apnea, wants a consult   Snores most of the time   Health Maintenance Due   Topic Date Due    TETANUS VACCINE  08/17/1997    Pneumococcal Vaccine (Medium Risk) (1 of 1 - PPSV23) 08/17/1998       History reviewed. No pertinent past medical history.    Current Outpatient Medications   Medication Sig Dispense Refill    acetaminophen (TYLENOL) 325 MG tablet Take 500 mg by mouth every 6 (six) hours as needed for Pain.      diazePAM (VALIUM) 5 MG tablet Take 1 tablet (5 mg total) by mouth every 6 (six) hours as needed (Vertigo). 3 tablet 0     No current facility-administered medications for this visit.        Review of Systems   Constitutional: Negative for fatigue, fever and unexpected weight change.   HENT: Negative for sore throat and trouble swallowing.    Respiratory: Negative for cough and shortness of breath.    Cardiovascular: Negative for chest pain.   Gastrointestinal: Negative for abdominal pain.   Skin: Negative for color change, pallor, rash and wound.   Hematological: Negative for adenopathy. Does not bruise/bleed easily.   All other systems reviewed and are negative.      Objective:   /84   Pulse 88   Temp 98 °F (36.7 °C) (Oral)   Ht 6' (1.829 m)   Wt 103.7 kg (228 lb 9.9 oz)   BMI 31.01 kg/m²      Physical Exam   Constitutional: He is oriented to person, place, and time. He appears well-developed and well-nourished. No distress.   HENT:   Head: Normocephalic and atraumatic.   Eyes: Pupils are equal, round, and reactive to light. EOM are normal.   Neck: Normal range of motion. Neck supple.   Cardiovascular: Normal  rate.   Abdominal: Soft.   Musculoskeletal: He exhibits no edema.        Legs:  Neurological: He is alert and oriented to person, place, and time.   Skin: Capillary refill takes less than 2 seconds.   Psychiatric: He has a normal mood and affect. His behavior is normal.         Lab Results   Component Value Date    WBC 6.42 01/14/2020    HGB 15.5 01/14/2020    HCT 47.5 01/14/2020     01/14/2020    CHOL 154 06/12/2018    TRIG 102 06/12/2018    HDL 28 (L) 06/12/2018     01/14/2020    K 4.0 01/14/2020     01/14/2020    CREATININE 1.0 01/14/2020    BUN 13 01/14/2020    CO2 27 01/14/2020       Assessment:       1. Lumbar radiculopathy    2. Suspected sleep apnea        Plan:   Lumbar radiculopathy  Letter for desk   Continue to see pain management   Suspected sleep apnea  -     Ambulatory referral/consult to Sleep Disorders; Future; Expected date: 03/09/2020

## 2020-03-04 NOTE — OP NOTE
"Procedure: Lumbar Transforaminal Epidural Steroid Injection under Fluoroscopic Guidance (supraneural approach)    Level: L4/5 L5/S1    Side: Left    PROCEDURE DATE: 3/4/2020    Pre-operative Diagnosis: Lumbar Radiculopathy  Post-operative Diagnosis: Lumbar Radiculopathy    Provider: Anjel Nugent MD  Assistant(s): None    Anesthesia: Local, IV Sedation    >> 2 mg of VERSED    >> 100 mcg of FENTANYL     Indication: Low back pain with radiculopathy consistent with distribution of targeted nerve. Symptoms unresponsive to conservative treatments. Fluoroscopy was used to optimize visualization of needle placement and to maximize safety.     Procedure Description / Technique:  The patient was seen and identified in the preoperative area. Risks, benefits, complications, and alternatives were discussed with the patient. The patient agreed to proceed with the procedure and signed the consent. The site and side of the procedure was identified and marked. An IV was started. The patient was taken to the procedural suite.    The patient was positioned in prone orientation on procedure table and a pillow was placed under the abdomen to reduce lumbar lordosis. A time out was performed prior to any intervention. The procedure, site, side, and allergies were stated and agreed to by all present. The lumbosacral area was widely prepped with ChloraPrep. The procedural site was draped in usual sterile fashion. Vital signs were closely monitored throughout this procedure. Conscious sedation was used for this procedure to decrease patient anxiety.    The target area was visualized under fluoroscopy. The cephalocaudal angle of the fluoroscope was adjusted as to align the vertebral end plates. The fluoroscopic arm was rotated ipsilaterally to an angle of approximately 30 degrees until the "pedro dog" outline came into view and the tip of the inferior superior articular process pointed towards the midline, 6:00 position of the above pedicle. " "A 25 gauge 3.5 inch spinal needle was directed towards the "chin" of the "pedro dog" (adjacent to the pars interarticularis and inferior to the pedicle). The needle was advanced until OS was met at the inferior border of the pedicle / pars interface. The needle was adjusted so that it would pass inferior to the osseous border. The fluoroscope was then placed in the lateral position and the needle was slowly advanced until it rested in the posterior 1/3rd of the vertebral foramen. AP fluoroscopy was checked and the needle tip rested at the 6:00 position under the pedicle. No paresthesia was elicited during needle placement. With the needle tip in its final position, gentle aspiration was negative for blood and CSF. Omnipaque 240 (1 to 2 mL) was injected under live fluoroscopy. Microbore tubing was used for injection. There was no pain or paresthesia on injection. The contrast clearly delineated the targeted nerve root on AP fluoroscopy. No vascular uptake was seen. A solution containing 2 mL of 1% PF Lidocaine and 2 mL of Dexamethasone (10 mg/mL) was mixed and 2 mL was injected slowly at each level targeted. There was minimal resistance on injection. No pain or paresthesia was elicited on injection. The stylet was replaced and the needle was withdrawn intact. This procedure was performed for each of the above indicated levels.     Description of Findings: Not applicable    Prosthetic devices, grafts, tissues, or devices implanted: None    Specimen Removed: No    Estimated Blood Loss: minimal    COMPLICATIONS: None    DISPOSITION / PLANS: The patient was transferred to the recovery area in a stable condition for observation. The patient was reexamined prior to discharge. There was no evidence of acute neurologic injury following the procedure.  Patient was discharged from the recovery room after meeting discharge criteria. Home discharge instructions were given to the patient by the staff.  "

## 2020-03-11 ENCOUNTER — TELEPHONE (OUTPATIENT)
Dept: PULMONOLOGY | Facility: CLINIC | Age: 40
End: 2020-03-11

## 2020-03-11 ENCOUNTER — PATIENT OUTREACH (OUTPATIENT)
Dept: ADMINISTRATIVE | Facility: OTHER | Age: 40
End: 2020-03-11

## 2020-03-11 DIAGNOSIS — R06.02 SOB (SHORTNESS OF BREATH): Primary | ICD-10-CM

## 2020-03-11 NOTE — PROGRESS NOTES
"Subjective:       Patient ID: Matthew Graham Blakemore is a 39 y.o. male.  Patient Active Problem List   Diagnosis    Unwanted fertility    Lumbar radiculopathy    Restless legs syndrome (RLS)    Tobacco abuse      Social History     Tobacco Use   Smoking Status Current Every Day Smoker    Packs/day: 0.50    Years: 27.00    Pack years: 13.50    Types: Vaping w/o nicotine    Start date: 1990   Smokeless Tobacco Never Used      Immunization History   Administered Date(s) Administered    Influenza 04/01/2019, 10/05/2019    Influenza - Quadrivalent 10/03/2019    Influenza - Quadrivalent - PF (6 months and older) 09/28/2016, 10/12/2018      EPWORTH SLEEPINESS SCALE 3/12/2020   Sitting and reading 1   Watching TV 0   Sitting, inactive in a public place (e.g. a theatre or a meeting) 1   As a passenger in a car for an hour without a break 3   Lying down to rest in the afternoon when circumstances permit 2   Sitting and talking to someone 0   Sitting quietly after a lunch without alcohol 3   In a car, while stopped for a few minutes in traffic 0   Total score 10     STOP BANG Questionnaire  Has loud snoring: (yes)  Disturbed sleep, daytime fatigue, daytime somnolence: (yes)  Observed to have interrupted breathing during sleep: (yes)  BMI (Calculated): 30.3  Has large neck size >40cm (15.7in., large male shirt size, large male collar size >16): (18")      Chief Complaint:   Matthew Graham Blakemore is 39 y.o.   Referred by David Corea MD   Wife and 2 children concern: Loud snoring, Apnea, Non freshing sleep  Daytime sleepiness,  Flopping about at night  SDI reviewed:  Bedtime 10:00 p.m. wake-up time 6:00 a.m..  Legs feel odd and restless every night.  Denies sleep attacks.  Move violently in sleep.  Indigestion and heartburn at night.  Take naps for 30 min.  Endorses having very loud snoring, nocturnal diaphoresis, witnessed apnea, dry mouth at night.  Difficulty falling asleep.  Wake up frequently at night.  " "Sleep to lightly.  Active duty .  Restless leg score 13    Sleep testing discussed    The following portions of the patient's history were reviewed and updated as appropriate:   He  has no past medical history on file.  He does not have any pertinent problems on file.  He  has a past surgical history that includes Hernia repair; Vasectomy (Bilateral, 1/31/2019); Ultrasound guidance (Left, 1/31/2019); and Selective injection of anesthetic agent around lumbar spinal nerve root by transforaminal approach (Left, 3/4/2020).  His family history includes Heart attacks under age 50 in his maternal grandfather; Melanoma in his brother, father, mother, and sister.  He  reports that he has been smoking vaping w/o nicotine. He started smoking about 30 years ago. He has a 13.50 pack-year smoking history. He has never used smokeless tobacco. He reports that he does not drink alcohol or use drugs.  He has a current medication list which includes the following prescription(s): acetaminophen and diazepam.  Current Outpatient Medications on File Prior to Visit   Medication Sig Dispense Refill    acetaminophen (TYLENOL) 325 MG tablet Take 500 mg by mouth every 6 (six) hours as needed for Pain.      diazePAM (VALIUM) 5 MG tablet Take 1 tablet (5 mg total) by mouth every 6 (six) hours as needed (Vertigo). (Patient not taking: Reported on 3/12/2020) 3 tablet 0     No current facility-administered medications on file prior to visit.      He is allergic to penicillins..     Review of Systems   Respiratory: Positive for sleep disturbances due to breathing and snoring. Negative for shortness of breath.    All other systems reviewed and are negative.     Objective:      Vitals:    03/12/20 0804   BP: 120/80   Pulse: 69   Resp: 17   SpO2: 99%   Weight: 98.4 kg (217 lb)   Height: 5' 11" (1.803 m)      Physical Exam   Constitutional: He is oriented to person, place, and time. He appears well-developed and well-nourished.   HENT: " "  Head: Normocephalic and atraumatic.   Nose: Nose normal.   Mouth/Throat: Oropharynx is clear and moist.   Mallampati 2   Eyes: Pupils are equal, round, and reactive to light. EOM are normal.   Neck: Normal range of motion. Neck supple.   Neck 18"   Cardiovascular: Normal rate, regular rhythm and normal heart sounds. Exam reveals no friction rub.   No murmur heard.  Pulmonary/Chest: Effort normal and breath sounds normal. No stridor. No respiratory distress. He has no wheezes.   Abdominal: Soft.   Musculoskeletal: Normal range of motion. He exhibits no edema.   Neurological: He is alert and oriented to person, place, and time. No cranial nerve deficit.   Skin: Skin is warm and dry. Capillary refill takes 2 to 3 seconds.   Psychiatric: He has a normal mood and affect.   Nursing note and vitals reviewed.      Data Review:   CBC:   Lab Results   Component Value Date    WBC 6.42 01/14/2020    RBC 5.35 01/14/2020    HGB 15.5 01/14/2020    HCT 47.5 01/14/2020     01/14/2020     BMP:   Lab Results   Component Value Date    GLU 83 01/14/2020     01/14/2020    K 4.0 01/14/2020     01/14/2020    CO2 27 01/14/2020    BUN 13 01/14/2020    CREATININE 1.0 01/14/2020    CALCIUM 9.6 01/14/2020     Diagnostics: Chest x-ray:       CLINICAL HISTORY:  Shortness of breath    TECHNIQUE:  PA and lateral views of the chest were performed.    COMPARISON:  None    FINDINGS:  Cardiac silhouette and mediastinal contours are normal.  Lungs are clear.  Osseous structures are intact.      Impression       No acute cardiopulmonary process.          Assessment:      Problem List Items Addressed This Visit     Restless legs syndrome (RLS) - Primary     · Urge to move limb when resting, ankles worse  · Gets worse at night  · Gets better with Movements    Meets criteria for RLS  Check ferritin  Discussed Mirapex and Requip         Relevant Orders    FERRITIN    Tobacco abuse     Assistance with smoking cessation was offered, " including:  []  Medications  [x]  Counseling  []  Printed Information on Smoking Cessation  []  Referral to a Smoking Cessation Program    Patient was counseled regarding smoking for 3-10 minutes.               Other Visit Diagnoses     Suspected sleep apnea        JULIANO (obstructive sleep apnea)        Relevant Orders    FERRITIN    Polysomnogram (CPAP will be added if patient meets diagnostic criteria.)             Plan:     My recommendation at this point would be to set up a HOME SLEEP TEST or INLAB POLYSOMNOGRAPHY  through [facility] ( [phone].    We have discussed weight loss , non supine position, good sleep hygiene, and  other interventions to foster good natural healthy sleep.  We have discussed behavioral modifications, as well.  After her study, she  could certainly try PAP therapy or out suitable alternatives          Follow up in about 8 weeks (around 5/7/2020), or RLS score, JULIANO, ferritin today: [place].    This note was prepared using voice recognition system and is likely to have sound alike errors that may have been overlooked even after proof reading.  Please call me with any questions    Discussed diagnosis, its evaluation, treatment and usual course. All questions answered.    Thank you for the courtesy of participating in the care of this patient    All Maxwell MD

## 2020-03-12 ENCOUNTER — LAB VISIT (OUTPATIENT)
Dept: LAB | Facility: HOSPITAL | Age: 40
End: 2020-03-12
Attending: INTERNAL MEDICINE
Payer: OTHER GOVERNMENT

## 2020-03-12 ENCOUNTER — OFFICE VISIT (OUTPATIENT)
Dept: SLEEP MEDICINE | Facility: CLINIC | Age: 40
End: 2020-03-12
Payer: OTHER GOVERNMENT

## 2020-03-12 ENCOUNTER — HOSPITAL ENCOUNTER (OUTPATIENT)
Dept: RADIOLOGY | Facility: HOSPITAL | Age: 40
Discharge: HOME | End: 2020-03-12
Attending: INTERNAL MEDICINE
Payer: OTHER GOVERNMENT

## 2020-03-12 VITALS
BODY MASS INDEX: 30.38 KG/M2 | OXYGEN SATURATION: 99 % | HEART RATE: 69 BPM | DIASTOLIC BLOOD PRESSURE: 80 MMHG | RESPIRATION RATE: 17 BRPM | WEIGHT: 217 LBS | HEIGHT: 71 IN | SYSTOLIC BLOOD PRESSURE: 120 MMHG

## 2020-03-12 DIAGNOSIS — G25.81 RESTLESS LEGS SYNDROME (RLS): Primary | ICD-10-CM

## 2020-03-12 DIAGNOSIS — R06.02 SOB (SHORTNESS OF BREATH): ICD-10-CM

## 2020-03-12 DIAGNOSIS — Z72.0 TOBACCO ABUSE: ICD-10-CM

## 2020-03-12 DIAGNOSIS — G47.33 OSA (OBSTRUCTIVE SLEEP APNEA): ICD-10-CM

## 2020-03-12 DIAGNOSIS — G25.81 RESTLESS LEGS SYNDROME (RLS): ICD-10-CM

## 2020-03-12 DIAGNOSIS — R29.818 SUSPECTED SLEEP APNEA: ICD-10-CM

## 2020-03-12 LAB — FERRITIN SERPL-MCNC: 214 NG/ML (ref 20–300)

## 2020-03-12 PROCEDURE — 99205 OFFICE O/P NEW HI 60 MIN: CPT | Mod: S$PBB,,, | Performed by: INTERNAL MEDICINE

## 2020-03-12 PROCEDURE — 99999 PR PBB SHADOW E&M-EST. PATIENT-LVL III: CPT | Mod: PBBFAC,,, | Performed by: INTERNAL MEDICINE

## 2020-03-12 PROCEDURE — 99213 OFFICE O/P EST LOW 20 MIN: CPT | Mod: PBBFAC,25 | Performed by: INTERNAL MEDICINE

## 2020-03-12 PROCEDURE — 99999 PR PBB SHADOW E&M-EST. PATIENT-LVL III: ICD-10-PCS | Mod: PBBFAC,,, | Performed by: INTERNAL MEDICINE

## 2020-03-12 PROCEDURE — 36415 COLL VENOUS BLD VENIPUNCTURE: CPT

## 2020-03-12 PROCEDURE — 71046 X-RAY EXAM CHEST 2 VIEWS: CPT | Mod: TC

## 2020-03-12 PROCEDURE — 82728 ASSAY OF FERRITIN: CPT

## 2020-03-12 PROCEDURE — 99205 PR OFFICE/OUTPT VISIT, NEW, LEVL V, 60-74 MIN: ICD-10-PCS | Mod: S$PBB,,, | Performed by: INTERNAL MEDICINE

## 2020-03-12 PROCEDURE — 71046 X-RAY EXAM CHEST 2 VIEWS: CPT | Mod: 26,,, | Performed by: RADIOLOGY

## 2020-03-12 PROCEDURE — 71046 XR CHEST PA AND LATERAL: ICD-10-PCS | Mod: 26,,, | Performed by: RADIOLOGY

## 2020-03-12 NOTE — ASSESSMENT & PLAN NOTE
· Urge to move limb when resting, ankles worse  · Gets worse at night  · Gets better with Movements    Meets criteria for RLS  Check ferritin  Discussed Mirapex and Requip

## 2020-03-12 NOTE — PATIENT INSTRUCTIONS
My recommendation at this point would be to set up a HOME SLEEP TEST or INLAB POLYSOMNOGRAPHY  through [facility] ( [phone].    We have discussed weight loss , non supine position, good sleep hygiene, and  other interventions to foster good natural healthy sleep.  We have discussed behavioral modifications, as well.  After her study, she  could certainly try PAP therapy or out suitable alternatives        Obstructive Sleep Apnea  Obstructive sleep apnea is a condition that causes your air passages to become narrowed or blocked during sleep. As a result, breathing stops for short periods. Your body wakes up enough for breathing to begin again, though you don't remember it. The cycle of stopped breathing and brief awakenings can repeat dozens of times a night. This prevents the body from getting to the deeper stages of sleep that are needed for good rest and may cause your body's oxygen level to fall.  Signs of sleep apnea include loud snoring, noisy breathing, and gasping sounds during sleep. Daytime symptoms include waking up tired after a full night's sleep, waking up with headaches, feeling very sleepy or falling asleep during the day, and having problems with memory or concentration.  Risk factors for sleep apnea include:  · Being overweight  · Being a man, or a woman in menopause  · Smoking  · Using alcohol or sedating medicines  · Having enlarged structures in the nose or throat  Home care  Lifestyle changes that can help treat snoring and sleep apnea include the following:  · If you are overweight, lose weight. Talk to your healthcare provider about a weight-loss plan for you.  · Avoid alcohol for 3 to 4 hours before bedtime. Avoid sedating medications. Ask your healthcare provider about the medicines you take.  · If you smoke, talk to your healthcare provider about ways to quit.  · Sleep on your side. This can help prevent gravity from pulling relaxed throat tissues into your breathing  passages.  · If you have allergies or sinus problems that block your nose, ask your healthcare provider for help.  Follow-up care  Follow up with your healthcare provider, or as advised. A diagnosis of sleep apnea is made with a sleep study. Your healthcare provider can tell you more about this test.  When to seek medical advice  Sleep apnea can make you more likely to have certain health problems. These include high blood pressure, heart attack, stroke, and sexual dysfunction. If you have sleep apnea, talk to your healthcare provider about the best treatments for you.  Date Last Reviewed: 4/1/2017  © 1079-2517 [employer]. [address] [place] [address], [place] [address]. All rights reserved. This information is not intended as a substitute for professional medical care. Always follow your healthcare professional's instructions.

## 2020-03-12 NOTE — LETTER
March 12, 2020      SARAY Garza  [address] [facility]  [place] [address]           [facility]  [address] [facility]  [place] [address]  Phone: [phone]  Fax: [phone]          Patient: Matthew Graham Blakemore   MR Number: [id number]   YOB: 1980   Date of Visit: 3/12/2020       Dear Darleen Perez:    Thank you for referring Matthew Blakemore to me for evaluation. Attached you will find relevant portions of my assessment and plan of care.    If you have questions, please do not hesitate to call me. I look forward to following Matthew Blakemore along with you.    Sincerely,    All Maxwell MD    Enclosure  CC:  No Recipients    If you would like to receive this communication electronically, please contact [email or web address] or [phone] to request more information on [employer] access.    For providers and/or their staff who would like to refer a patient to [facility], please contact us through our one-stop-shop provider referral line, [facility], at [phone].    If you feel you have received this communication in error or would no longer like to receive these types of communications, please e-mail [email or web address]

## 2020-03-23 ENCOUNTER — TELEPHONE (OUTPATIENT)
Dept: PAIN MEDICINE | Facility: CLINIC | Age: 40
End: 2020-03-23

## 2020-04-05 ENCOUNTER — PATIENT OUTREACH (OUTPATIENT)
Dept: ADMINISTRATIVE | Facility: OTHER | Age: 40
End: 2020-04-05

## 2020-04-06 ENCOUNTER — PATIENT MESSAGE (OUTPATIENT)
Dept: PAIN MEDICINE | Facility: CLINIC | Age: 40
End: 2020-04-06

## 2020-04-06 ENCOUNTER — OFFICE VISIT (OUTPATIENT)
Dept: PAIN MEDICINE | Facility: CLINIC | Age: 40
End: 2020-04-06
Payer: OTHER GOVERNMENT

## 2020-04-06 ENCOUNTER — TELEPHONE (OUTPATIENT)
Dept: PAIN MEDICINE | Facility: CLINIC | Age: 40
End: 2020-04-06

## 2020-04-06 DIAGNOSIS — M54.16 LUMBAR RADICULOPATHY: ICD-10-CM

## 2020-04-06 DIAGNOSIS — M47.816 LUMBAR SPONDYLOSIS: Primary | ICD-10-CM

## 2020-04-06 PROCEDURE — 99213 PR OFFICE/OUTPT VISIT, EST, LEVL III, 20-29 MIN: ICD-10-PCS | Mod: 95,,, | Performed by: ANESTHESIOLOGY

## 2020-04-06 PROCEDURE — 99213 OFFICE O/P EST LOW 20 MIN: CPT | Mod: 95,,, | Performed by: ANESTHESIOLOGY

## 2020-04-07 NOTE — PROGRESS NOTES
The patient location is: Home  The chief complaint leading to consultation is: Lumbar Back Pain  Visit type: Virtual visit with synchronous audio and video  Total time spent with patient: 15 mins  Each patient to whom he or she provides medical services by telemedicine is:  (1) informed of the relationship between the physician and patient and the respective role of any other health care provider with respect to management of the patient; and (2) notified that he or she may decline to receive medical services by telemedicine and may withdraw from such care at any time.    Chief Pain Complaint:  Low Back Pain   Left Leg Numbness     History of Present Illness:   Matthew Graham Blakemore is a 39 y.o. male  who is presenting with a chief complaint of Low Back Pain. The patient began experiencing this problem insidiously, and the pain has been gradually worsening over the past 12 month(s). The pain is described as throbbing, shooting, burning and electrical and is located in the bilateral lumbar spine L>R. Pain is intermittent and lasts hours. The pain radiates to  left leg L4, L5 distribution . The patient rates his pain a 2 out of ten and interferes with activities of daily living a 2 out of ten. Numbness is the major concern. Pain is exacerbated by flexion of the lumbar spine, and is improved by rest. Patient reports no prior trauma, no prior spinal surgery     - pertinent negatives: No fever, No chills, No weight loss, No bladder dysfunction, No bowel dysfunction, No saddle anesthesia  - pertinent positives: left leg weakness . Some urge incontinence, inability to maintain an erection.   - medications, other therapies tried (physical therapy, injections):     >> NSAIDs, Tylenol, oxycodone and flexeril    >> Has previously undergone Physical Therapy    >> Has previously undergone spinal injection/s          Left L4-5, L5-S1 TFESI on 3/4/2020 with 80% pain relief.       Imaging / Labs / Studies (reviewed on  "4/7/2020):    Results for orders placed during the hospital encounter of 02/11/20   MRI Lumbar Spine W WO Contrast    Narrative EXAMINATION:  MRI LUMBAR SPINE W WO CONTRAST    CLINICAL HISTORY:  Low back pain, prior surgery, new or progressive sx;urinary symptoms along with "numbness in penis" - had old Lumbar injury and had bladder issues then;  Low back pain    TECHNIQUE:  Multiplanar, multisequence MR images were acquired from the thoracolumbar junction to the sacrum before and after administration of 10 cc Gadavist intravenous contrast material.    COMPARISON:  None.    FINDINGS:  Alignment: Normal.    Vertebrae: Normal marrow signal. No fracture.    Discs: Degenerative disc space narrowing and desiccation at L4-5 and L5-S1.    Cord: Normal.  Conus terminates at L1.    Degenerative findings:    T12-L1: No significant foraminal narrowing or central canal stenosis.    L1-L2: No significant foraminal narrowing or central canal stenosis.    L2-L3: No significant foraminal narrowing or central canal stenosis.    L3-L4: No significant foraminal narrowing or central canal stenosis.    L4-L5: Broad-based posterior disc bulge with superimposed central/left paracentral disc protrusion which indents the anterolateral left side of the thecal sac.  Resultant moderate left foraminal narrowing.  No significant central canal stenosis.    L5-S1: Broad-based posterior disc bulge with superimposed large left paracentral/foraminal disc protrusion and left paracentral annular tear.  Resultant moderate to marked left foraminal narrowing.  Indentation of the anterolateral left side of the thecal sac without significant central canal stenosis.    Paraspinal muscles & soft tissues: Unremarkable.  No abnormal enhancement.      Impression Multilevel degenerative disc disease involving L4-5 and L5-S1 levels as noted.      Electronically signed by: FLORA Mooney MD  Date:    02/11/2020  Time:    11:16     Review of " Systems:  CONSTITUTIONAL: patient denies any fever, chills, or weight loss  SKIN: patient denies any rash or itching  RESPIRATORY: patient denies having any shortness of breath  GASTROINTESTINAL: patient denies having any diarrhea, constipation, or bowel incontinence  GENITOURINARY: patient denies having any abnormal bladder function    MUSCULOSKELETAL:  - patient complains of the above noted pain/s (see chief pain complaint)    NEUROLOGICAL:   - pain as above  - strength in Lower extremities is decreased, on the LEFT  - sensation in Lower extremities is intact, BILATERALLY  - patient denies any loss of bowel or bladder control      PSYCHIATRIC: patient denies any change in mood    Other:  All other systems reviewed and are negative      Physical Exam:  Telemed exam     GENERAL: Well appearing, in no acute distress, alert and oriented x3.    PSYCH:  Mood and affect appropriate.  SKIN: Skin color, texture, turgor normal, no rashes or lesions.  HEAD/FACE:  Normocephalic, atraumatic. Cranial nerves grossly intact.  CV:  No peripheral edema noted  PULM:  No difficulty breathing    Neuro/MSK:  NECK: No pain to palpation over the cervical paraspinous muscles. Spurling Negative (self-performed). No pain with neck flexion, extension, or lateral flexion.  BACK: Straight leg raising in the seated position (self-performed) is negative to radicular pain. No pain to palpation over the facet joints of the lumbar spine or spinous processes. Normal range of motion without pain reproduction.  EXTREMITIES: Peripheral joint active ROM is full and pain free without obvious instability or laxity in all four extremities. No deformities, edema, or skin discoloration.   MUSCULOSKELETAL:  There is no pain with palpation over the sacroiliac joints bilaterally.  FABERs test is negative (self-performed).  FADIRs test is negative (self-performed).   No atrophy or tone abnormalities are noted.  NEURO:  Able to toe walk and heel walk without  difficulty  GAIT: normal.    Physical Exam from last visit     There were no vitals taken for this visit. (reviewed on 4/7/2020)  General: Alert and oriented, in no apparent distress.  Gait: normal gait.  Skin: No rashes, No discoloration, No obvious lesions  HEENT: Normocephalic, atraumatic. Pupils equal and round.  Cardiovascular: Regular rate and rhythm , no significant peripheral edema present  Respiratory: Without audible wheezing, without use of accessory muscles of respiration.    Musculoskeletal:    Cervical Spine    - Pain on flexion of cervical spine Absent  - Spurling's Test:  Absent    - Pain on extension of cervical spine Absent  - TTP over the cervical facet joints Absent  - Cervical facet loading Absent      Lumbar Spine    - Pain on flexion of lumbar spine Present  - Straight Leg Raise:  Present    - Pain on extension of lumbar spine Absent  - TTP over the lumbar facet joints Absent  - Lumbar facet loading Absent    -Pain on palpation over the SI joint  Absent  - RUTH: Absent      Neuro:    Strength:  UE R/L: D: 5/5; B: 5/5; T: 5/5; WF: 5/5; WE: 5/5; IO: 5/5;  LE R/L: HF: 5/4, HE: 5/5, KF: 5/5; KE: 5/4; FE: 5/5; FF: 5/5    Extremity Reflexes: Brisk and symmetric throughout.      Extremity Sensory: Sensation to pinprick and temperature symmetric. Proprioception intact.      Psych:  Mood and affect is appropriate      Assessment:    Matthew Graham Blakemore is a 39 y.o. year old male who is presenting with   Encounter Diagnoses   Name Primary?    Lumbar spondylosis Yes    Lumbar radiculopathy        Plan:    1. Interventional: S/p  Left L4-5, L5-S1 TFESI on 3/4/2020 with 80% pain relief.     2. Pharmacologic: Consider Gabapetnin but patient wants to hold off for now.     3. Rehabilitative: Patient already going to PT.     4. Diagnostic: Lumbar MRI reviewed.     5.  Follow up: Post Injection.    20  minutes were spent in this encounter with more than 50% of the time used for counseling and review of  the plan.  Imaging / studies reviewed, detailed above.  I discussed in detail the risks, benefits, and alternatives to any and all potential treatment options.  All questions and concerns were fully addressed today in clinic. Medical decision making moderate.    Thank you for the opportunity to assist in the care of this patient.    Best wishes,    Signed:    Anjel Nugent MD          Disclaimer:  This note may have been prepared using voice recognition software, it may have not been extensively proofed, as such there could be errors within the text such as sound alike errors.

## 2020-04-14 ENCOUNTER — CLINICAL SUPPORT (OUTPATIENT)
Dept: REHABILITATION | Facility: HOSPITAL | Age: 40
End: 2020-04-14
Attending: ANESTHESIOLOGY
Payer: OTHER GOVERNMENT

## 2020-04-14 DIAGNOSIS — M47.816 LUMBAR SPONDYLOSIS: ICD-10-CM

## 2020-04-14 DIAGNOSIS — M54.42 CHRONIC BILATERAL LOW BACK PAIN WITH LEFT-SIDED SCIATICA: Primary | ICD-10-CM

## 2020-04-14 DIAGNOSIS — G89.29 CHRONIC BILATERAL LOW BACK PAIN WITH LEFT-SIDED SCIATICA: Primary | ICD-10-CM

## 2020-04-14 DIAGNOSIS — M54.16 LUMBAR RADICULOPATHY: ICD-10-CM

## 2020-04-14 PROCEDURE — 97161 PT EVAL LOW COMPLEX 20 MIN: CPT

## 2020-04-14 PROCEDURE — 97110 THERAPEUTIC EXERCISES: CPT

## 2020-04-14 NOTE — PLAN OF CARE
[facility]  Physical Therapy Initial Evaluation    Date: 4/14/2020   Name: Matthew Graham Blakemore  Clinic Number: [id number]    Therapy Diagnosis:   Encounter Diagnoses   Name Primary?    Chronic bilateral low back pain with left-sided sciatica Yes    Lumbar spondylosis     Lumbar radiculopathy      Physician: Anjel Nugent MD    Physician Orders: PT Eval and Treat   Medical Diagnosis from Referral:   M47.816 (ICD-10-CM) - Lumbar spondylosis   M54.16 (ICD-10-CM) - Lumbar radiculopathy     Evaluation Date: 4/14/2020  Authorization Period Expiration: 6/18/2020  Plan of Care Expiration: 7/13/2020  Visit # / Visits authorized: 1/16    Time In: 3:45pm  Time Out: 4:30pm  Total Appointment Time (timed & untimed codes): 45 minutes    Precautions: Standard    Surgery: Injection on 3/4/2020 for Spinal Block    Subjective   Date of onset: January   History of current condition - Richard reports: His back has been bothering him for a while. He states he started to have numbness down into his legs in January and that is when he decided to have something done. He states he had an MRI and found out he had a bulging disc. He states he got an injection around a month ago. He states overall the injection has helped him and he doesn't really have any symptoms. He states he really wanted to come to therapy so that he could prevent it from coming back. He states he would like to be able to sit for long periods, stand for long periods, walk for long distances like he used to. He denies all numbness and tingling now. He states in his free time he likes to ride horses and he works in the [employer] so he needed to be able to move around easy.      Medical History:   No past medical history on file.    Surgical History:   Matthew Graham Blakemore  has a past surgical history that includes Hernia repair; Vasectomy (Bilateral, 1/31/2019); Ultrasound guidance (Left, 1/31/2019); and Selective injection of anesthetic  agent around lumbar spinal nerve root by transforaminal approach (Left, 3/4/2020).    Medications:   Richard has a current medication list which includes the following prescription(s): acetaminophen and diazepam.    Allergies:   Review of patient's allergies indicates:   Allergen Reactions    Penicillins Other (See Comments)     unknown        Imaging, MRI studies: Multilevel degenerative disc disease involving L4-5 and L5-S1 levels as noted.    Prior Therapy: multiple times for his low back  Social History: lives with their family  Occupation: [employer]  Prior Level of Function: walking, sitting, standing, and playing with kids  Current Level of Function: moving slow and can't sit in one position for extended periods.     Pain:  Current 0/10, worst 6/10, best 0/10   Location: bilateral back   Description: Numb  Aggravating Factors: Sitting  Easing Factors: Standing    Pts goals: prevent the return of symptoms     Objective     Sensation:  Sensation is intact to light touch    (WFL: Within Functional Limits)     ROM   Right (degrees) Left (degrees)   Lumbar Flexion  50% 50%   Lumbar Extension 50% 50%   Lumbar Sidebending 75% 75%   Lumbar Rotation 50% slight pain 50% slight pain     Hip Flexion (125) WFL WFL   Hip Extension (15) WFL WFL   Hip Internal Rotation (45) WFL WFL   Hip External Rotation (45) WFL WFL   Hip Abduction (45) WFL WFL     Strength   Right    Left   Gluteus Wilman 4+/5 4+/5   Gluteus Medius 4-/5 4-/5   Hip Adductors 4/5 4/5   Psoas 4/5 4/5   Quadriceps 4+/5 4+/5   Hamstrings 4+/5 4+/5   Anterior Tibialis 5/5 5/5   Gastroc/Soleus 5/5 5/5   Transverse Abdominis 4-/5 4-/5       Muscle Length: decreased hamstring length bilaterally     Palpation: none     Movement Analysis: Squat form fine but weakness in legs causing shaky eccentric     Gait Analysis: The patient ambulated with the use of none and presents with the following gait abnormalities: normal    Other: Pt presents with postural abnormalities  which include: forward head and rounded shoulders     Function:     CMS Impairment/Limitation/Restriction for FOTO Low Back Survey    Therapist reviewed FOTO scores for Matthew Graham Blakemore on 4/14/2020.   FOTO documents entered into [facility] - see Media section.    Limitation Score: 30%  Category: Other         TREATMENT   Treatment Time In: 4:15pm  Treatment Time Out: 4:30pm  Total Treatment time (time-based codes) separate from Evaluation: 15 minutes    Richard received therapeutic exercises to develop strength, endurance, ROM, flexibility, posture and core stabilization for 15 minutes including:  Demonstrating all exercises for HEP    Home Exercises and Patient Education Provided    Education provided:   - straight leg raise, sidelying abduction, bridges, single leg balance, squats, single knee to chest, hamstring stretch, pelvic tilts, and trunk rotations     Written Home Exercises Provided: yes.  Exercises were reviewed and Richard was able to demonstrate them prior to the end of the session.  Richard demonstrated good  understanding of the education provided.     See EMR under Patient Instructions for exercises provided 4/14/2020.    Assessment   Richard is a 39 y.o. male referred to outpatient Physical Therapy with a medical diagnosis of   M47.816 (ICD-10-CM) - Lumbar spondylosis   M54.16 (ICD-10-CM) - Lumbar radiculopathy   . The patient presents with impairments which include decreased ROM, decreased strength, decreased muscle length and decreased overall function.  These impairments are limiting patient's ability to walk long distances, stand and sit for long periods, and play with his kids. Pt prognosis is Excellent due to personal factors and co-morbidities listed below. Pt will benefit from skilled outpatient Physical Therapy to address the deficits stated above and in the chart below, provide pt/family education, and to maximize pt's level of independence.     Plan of care discussed with patient:  Yes  Pt's spiritual, cultural and educational needs considered and patient is agreeable to the plan of care and goals as stated below:     Anticipated Barriers for therapy: none    Medical Necessity is demonstrated by the following  History  Co-morbidities and personal factors that may impact the plan of care Co-morbidities:   chronic stage of condition     Personal Factors:   no deficits     low   Examination  Body Structures and Functions, activity limitations and participation restrictions that may impact the plan of care Body Regions:   back  lower extremities    Body Systems:    ROM  strength  gross coordinated movement    Participation Restrictions:   None    Activity limitations:   Learning and applying knowledge  no deficits    General Tasks and Commands  no deficits    Communication  no deficits    Mobility  lifting and carrying objects  walking    Self care  no deficits    Domestic Life  no deficits    Interactions/Relationships  no deficits    Life Areas  no deficits    Community and Social Life  recreation and leisure         low   Clinical Presentation stable and uncomplicated low   Decision Making/ Complexity Score: low     Goals:  Short Term Goals: 6 weeks   1. Recent signs and systems trend is improving in order to progress towards LTG's.  2. Patient will improve lumbar rotation to 75% in order to drive and ride horses.  3. Patient will be independent with HEP in order to further progress and return to maximal function.  4. Pain rating at Worst: 4/10 in order to progress towards increased independence with activity.    Long Term Goals: 12 weeks   1. Patient will improve glute med strength to 4+/5 in order to squat down with kids.  2. Patient will improve transverse abdominis strength to 4+/5 in order to work with no difficulty.  3. Patient will improve lumbar flexion to 75% in order to ride horses.   4. Patient will demonstrate normal gait mechanics in order to minimize any compensation and return to  PLOF.   5. Patient will self report improvement to 20% limitation on the FOTO Low Back Survey.     Plan   Plan of care Certification: 4/14/2020 to 7/13/2020.    Outpatient Physical Therapy 1 time a week for 12 weeks to include Virtual Visits until the clinic is back open to all patients where he can then be seen 2 times weekly to include the following interventions: Electrical Stimulation IFC, Gait Training, Manual Therapy, Moist Heat/ Ice, Neuromuscular Re-ed, Patient Education, Therapeutic Activites, Therapeutic Exercise and Virtual Visits.     S[place]is [place], PT, DPT

## 2020-04-20 ENCOUNTER — TELEPHONE (OUTPATIENT)
Dept: REHABILITATION | Facility: HOSPITAL | Age: 40
End: 2020-04-20

## 2020-04-20 NOTE — TELEPHONE ENCOUNTER
Called patient twice in regards to virtual visit at 10:00AM with no response either time. Left a voicemail for patient to remind him about call and to see if he was still planning on attending on if he would like to reschedule. Left clinic phone number for the patient to call back.    Lexy Reed, PT, DPT

## 2020-05-06 ENCOUNTER — PATIENT MESSAGE (OUTPATIENT)
Dept: PULMONOLOGY | Facility: CLINIC | Age: 40
End: 2020-05-06

## 2020-05-21 DIAGNOSIS — G47.33 OSA (OBSTRUCTIVE SLEEP APNEA): Primary | ICD-10-CM

## 2020-05-26 ENCOUNTER — PATIENT MESSAGE (OUTPATIENT)
Dept: OTOLARYNGOLOGY | Facility: CLINIC | Age: 40
End: 2020-05-26

## 2020-05-26 ENCOUNTER — PATIENT MESSAGE (OUTPATIENT)
Dept: INTERNAL MEDICINE | Facility: CLINIC | Age: 40
End: 2020-05-26

## 2020-06-02 ENCOUNTER — LAB VISIT (OUTPATIENT)
Dept: OTOLARYNGOLOGY | Facility: CLINIC | Age: 40
End: 2020-06-02
Payer: OTHER GOVERNMENT

## 2020-06-02 DIAGNOSIS — G47.33 OSA (OBSTRUCTIVE SLEEP APNEA): ICD-10-CM

## 2020-06-02 PROCEDURE — U0003 INFECTIOUS AGENT DETECTION BY NUCLEIC ACID (DNA OR RNA); SEVERE ACUTE RESPIRATORY SYNDROME CORONAVIRUS 2 (SARS-COV-2) (CORONAVIRUS DISEASE [COVID-19]), AMPLIFIED PROBE TECHNIQUE, MAKING USE OF HIGH THROUGHPUT TECHNOLOGIES AS DESCRIBED BY CMS-2020-01-R: HCPCS

## 2020-06-03 LAB — SARS-COV-2 RNA RESP QL NAA+PROBE: NOT DETECTED

## 2020-06-04 ENCOUNTER — HOSPITAL ENCOUNTER (OUTPATIENT)
Dept: SLEEP MEDICINE | Facility: HOSPITAL | Age: 40
Discharge: HOME | End: 2020-06-04
Attending: INTERNAL MEDICINE
Payer: OTHER GOVERNMENT

## 2020-06-04 DIAGNOSIS — R06.83 PRIMARY SNORING: ICD-10-CM

## 2020-06-04 DIAGNOSIS — G47.33 OSA (OBSTRUCTIVE SLEEP APNEA): ICD-10-CM

## 2020-06-04 DIAGNOSIS — G47.61 PLMD (PERIODIC LIMB MOVEMENT DISORDER): ICD-10-CM

## 2020-06-04 PROCEDURE — 95810 POLYSOM 6/> YRS 4/> PARAM: CPT | Mod: 26,,, | Performed by: INTERNAL MEDICINE

## 2020-06-04 PROCEDURE — 95810 POLYSOM 6/> YRS 4/> PARAM: CPT

## 2020-06-04 PROCEDURE — 95810 PR POLYSOMNOGRAPHY, 4 OR MORE: ICD-10-PCS | Mod: 26,,, | Performed by: INTERNAL MEDICINE

## 2020-06-04 NOTE — Clinical Note
No Significant Obstructive Sleep apnea(JULIANO): AHI was 1.2 events per hour.EKG showed no cardiac abnormalities.Mild Oxygen DesaturationEEG did not show alpha intrusion.The patient snored with moderate snoring volume.Significant periodic leg movements(PLMs) Initial part of night.PLMindex: 28.1/hr . However, no significantassociated arousals.PLMAI was 1.5/hrNo Significant Central Sleep Apnea (CSA)Normal sleep efficiency, long primary sleep latency, long REM sleep latency and long slow wave latency.Reduced testing in Supine position may underestimate JULIANO severityCheyne Navarrete Respiratory patternBiot's Respiratory patternPeriodic limb movement disorderPrimary snoringPsychophysiologic insomnia based on historyBlakemore, MatthewD.O.B - 1980Page 2 of 3Electronically Authenticated By All Maxwell MD on 06/08/2020 06:03 PM, from [phone] Maxwell MDNPI: [id number] correlation for restless legs syndrome. Check lnyn

## 2020-06-08 NOTE — PROCEDURES
No Significant Obstructive Sleep apnea(JULIANO): AHI was 1.2 events per hour.  EKG showed no cardiac abnormalities.  Mild Oxygen Desaturation  EEG did not show alpha intrusion.  The patient snored with moderate snoring volume.  Significant periodic leg movements(PLMs) Initial part of night.PLMindex: 28.1/hr . However, no significant  associated arousals.PLMAI was 1.5/hr  No Significant Central Sleep Apnea (CSA)  Normal sleep efficiency, long primary sleep latency, long REM sleep latency and long slow wave latency.  Reduced testing in Supine position may underestimate JULIANO severity  Cheyne Navarrete Respiratory pattern  Biot's Respiratory pattern  Periodic limb movement disorder  Primary snoring  Psychophysiologic insomnia based on history  Blakemore, Matthew YOB: 1980  Page 2 of 3  Electronically Authenticated By All Maxwell MD on 06/08/2020 06:03 PM, from [phone]  All Maxwell MD  NPI: [id number]  RECOMMENDATIONS  MISCELLANEOUS  Clinical correlation for restless legs syndrome. Check ferritin. Consider addition of movement disorder  medications pramipexole or ropinirole  Avoid alcohol, sedatives and other CNS depressants that may worsen sleep apnea and disrupt normal sleep  architecture.  Sleep hygiene should be reviewed to assess factors that may improve sleep quality.  Weight management and regular exercise should be initiated or continued.  Interventions for SNORING: Mandibular device, ENT surgeries where appropriate and NASAL THEREVENT

## 2020-07-01 ENCOUNTER — PATIENT MESSAGE (OUTPATIENT)
Dept: INTERNAL MEDICINE | Facility: CLINIC | Age: 40
End: 2020-07-01

## 2020-07-02 ENCOUNTER — TELEPHONE (OUTPATIENT)
Dept: INTERNAL MEDICINE | Facility: CLINIC | Age: 40
End: 2020-07-02

## 2020-07-02 RX ORDER — SILVER SULFADIAZINE 10 G/1000G
CREAM TOPICAL 2 TIMES DAILY
Qty: 30 G | Refills: 0 | Status: SHIPPED | OUTPATIENT
Start: 2020-07-02 | End: 2020-08-26

## 2020-07-02 NOTE — TELEPHONE ENCOUNTER
----- Message from Lakisha Cheng sent at 7/2/2020  4:16 PM CDT -----  Contact: [phone]  Type:  Needs Medical Advice    Who Called: Pt   Symptoms (please be specific):  regarding his medication for silver sulfADIAZINE 1% (SILVADENE) 1 % cream, pt states the Pharmacy was closed  How long has patient had these symptoms:  n/a  Pharmacy name and phone #:  n/a  Would the patient rather a call back or a response via [facility]? Call Back  Best Call Back Number: [phone]  Additional Information: n/a

## 2020-07-13 ENCOUNTER — PATIENT OUTREACH (OUTPATIENT)
Dept: ADMINISTRATIVE | Facility: OTHER | Age: 40
End: 2020-07-13

## 2020-07-13 NOTE — PROGRESS NOTES
Requested updates within Care Everywhere.  Patient's chart was reviewed for overdue LAINE topics.  Immunizations reconciled.

## 2020-07-14 ENCOUNTER — OFFICE VISIT (OUTPATIENT)
Dept: PULMONOLOGY | Facility: CLINIC | Age: 40
End: 2020-07-14
Payer: OTHER GOVERNMENT

## 2020-07-14 VITALS
WEIGHT: 225.5 LBS | HEART RATE: 71 BPM | HEIGHT: 71 IN | BODY MASS INDEX: 31.57 KG/M2 | SYSTOLIC BLOOD PRESSURE: 124 MMHG | OXYGEN SATURATION: 98 % | DIASTOLIC BLOOD PRESSURE: 80 MMHG | RESPIRATION RATE: 18 BRPM

## 2020-07-14 DIAGNOSIS — G25.81 RESTLESS LEG SYNDROME: Primary | ICD-10-CM

## 2020-07-14 DIAGNOSIS — R06.83 PRIMARY SNORING: ICD-10-CM

## 2020-07-14 DIAGNOSIS — G47.33 OSA (OBSTRUCTIVE SLEEP APNEA): ICD-10-CM

## 2020-07-14 DIAGNOSIS — Z72.0 TOBACCO ABUSE: ICD-10-CM

## 2020-07-14 DIAGNOSIS — G25.81 RESTLESS LEGS SYNDROME (RLS): ICD-10-CM

## 2020-07-14 PROCEDURE — 99999 PR PBB SHADOW E&M-EST. PATIENT-LVL IV: CPT | Mod: PBBFAC,,, | Performed by: NURSE PRACTITIONER

## 2020-07-14 PROCEDURE — 99214 OFFICE O/P EST MOD 30 MIN: CPT | Mod: PBBFAC | Performed by: NURSE PRACTITIONER

## 2020-07-14 PROCEDURE — 99214 OFFICE O/P EST MOD 30 MIN: CPT | Mod: S$PBB,,, | Performed by: NURSE PRACTITIONER

## 2020-07-14 PROCEDURE — 99999 PR PBB SHADOW E&M-EST. PATIENT-LVL IV: ICD-10-PCS | Mod: PBBFAC,,, | Performed by: NURSE PRACTITIONER

## 2020-07-14 PROCEDURE — 99214 PR OFFICE/OUTPT VISIT, EST, LEVL IV, 30-39 MIN: ICD-10-PCS | Mod: S$PBB,,, | Performed by: NURSE PRACTITIONER

## 2020-07-14 RX ORDER — PRAMIPEXOLE DIHYDROCHLORIDE 0.25 MG/1
0.25 TABLET ORAL NIGHTLY
Qty: 90 TABLET | Refills: 4 | Status: SHIPPED | OUTPATIENT
Start: 2020-07-14 | End: 2022-08-10

## 2020-07-14 NOTE — ASSESSMENT & PLAN NOTE
6/4/2020 PSG No obstructive sleep apnea AHI 1.2   May try Theravent nasal strips, patient not interested in nasal strips  Oral appliance, otc or see dentist, has dentist appointment this week

## 2020-07-14 NOTE — PATIENT INSTRUCTIONS
Understanding Restless Legs Syndrome    Are you ever annoyed by a creeping or itching feeling in your legs? Do you often feel an urge to move your legs while sitting or lying in bed? This can keep you from falling asleep at night. You may then feel tired during the day. If you have these problems, talk to your health care provider. He or she can suggest a treatment plan and help you find ways to sleep better.  Restless legs syndrome (RLS)  RLS is a creeping, crawly, or jumpy feeling in the legs with an urge to move them. Symptoms of RLS often occur during periods of inactivity, such as when you sit or lie down at night. This discomfort can keep you from falling asleep. RLS is more common in older people and tends to run in families. Overuse of caffeine or alcohol may make symptoms worse. Iron deficiency, diabetes, or kidney problems can contribute to RLS.  Periodic limb movement syndrome (PLMS)  PLMS is sudden, repetitive leg jerking during sleep. The person you sleep with is often the one who notices it. Your legs may jerk many times during the night. You and your partner may both have trouble sleeping and feel tired in the morning. PLMS shouldnt be confused with the normal leg or body twitching many people have when first falling asleep.  Treating these problems  If these problems are causing disrupted sleep and daytime symptoms, treatment may be needed. Possible treatments may include:  · Avoiding medications like antidepressants, antinausea medications, and antipsychotic medications.   · Prescribed medications.  · Lifestyle changes, such as controlling caffeine intake, alcohol, and smoking.  Date Last Reviewed: 7/18/2015  © 1978-6419 [employer]. [address] [place] [address], [place] [address]. All rights reserved. This information is not intended as a substitute for professional medical care. Always follow your healthcare professional's instructions.        Restless Legs Syndrome: What You Can  Do  Symptoms of restless leg syndrome (RLS) can be treated. Together, you and your health care provider can work on your treatment plan. If needed, medications may be prescribed. Also learn what you can do to ease your discomfort. Good sleep habits and a healthy lifestyle will help you rest better at night and have more energy during the day.    Working with your health care provider  RLS may occur on its own and may be passed on in families. It is sometimes linked to other medical problems. Low iron may cause some RLS symptoms. Your health care provider may order a lab test to check your iron level. Other medical problems associated with RLS are kidney disease, diabetes, and multiple sclerosis. Your doctor may prescribe medications to reduce your symptoms and help you sleep better.  Tips for temporary relief  To reduce your discomfort, try the following:  · Walking or stretching  · Rubbing your legs  · Having a massage  · Taking a hot or cold bath  · Doing activities that make muscles in your hands or legs work  · Relaxing with yoga or meditation  Good sleep habits  Even though you have RLS, you can still have restful sleep. Try these good sleeping habits:  · Keep a regular sleep schedule. Go to bed and get up at the same time each day.  · Avoid or limit naps.  · Make sure the bedroom is quiet, dark, and not too hot or too cold.  · Use your bed only for sleep and sex.  Healthy lifestyle  Your lifestyle affects your health and your sleep. Here are some healthy habits:  · Eat a balanced diet. To get enough vitamins and minerals, you may also need to take supplements.  · Manage stress and learn ways to relax. Deep breathing techniques and visualization can help to relax your muscles and calm your mind.  · Exercise regularly. It can help reduce stress. Also, you will have more energy during the day and be more tired at bedtime. Afternoon exercise is best. Nighttime exercise may affect how well you sleep.  · Avoid  alcohol, nicotine, and caffeine.  Date Last Reviewed: 6/7/2015  © 3543-2601 [employer]. [address] [place] [address], [place] [address]. All rights reserved. This information is not intended as a substitute for professional medical care. Always follow your healthcare professional's instructions.        Tips to Help Prevent Snoring    Your snoring may get better if you make a few simple changes in your sleeping and waking habits. These changes might be all you need to improve or even cure your snoring, or they may work best when used along with other types of treatment.  Sleep on your side  Sleeping on your side may keep throat tissue from blocking your air passage. This may improve or even cure snoring. But it can be hard to stop sleeping on your back. Try sewing a pocket or sock onto the back of a T-shirt or pajama top. Put a few tennis balls or a bag of unshelled nuts into this pocket or sock, then wear the shirt to bed. This will help keep you from rolling onto your back. If this doesn't work, try wearing a backpack full of foam pieces, or put a wedge-shaped pillow behind you. You can [employer] purchase devices online.  Avoid alcohol and certain medicines  Alcohol and medicines such as sedatives, sleeping pills, and antihistamines make breathing slower and more shallow. They also make your muscles relax, so structures in your throat can block your air passage. These changes can cause or worsen snoring. If you snore, avoid alcohol. Talk to your healthcare provider if you take medicines to help you sleep.  Lose weight  Too much weight can make snoring worse. Extra weight puts pressure on your neck tissues and lungs, making breathing harder. If you're overweight, ask your healthcare provider about a weight-loss program.  Exercise regularly  Exercise can help you lose weight, tone your muscles, and make your lungs work better. These changes may help improve your snoring. Ask your healthcare provider about an  exercise program like walking, or something else that you enjoy.  Unblock your nose  If something blocks your nose, treating the problem may help improve snoring. Your healthcare provider can suggest medications for allergies or sinus problems. Nasal saline rinses can also open up the nasal passages.Nasal strips applied on the bridge of the nose can aid breathing. Surgery can straighten a deviated septum, reduce the size of the turbinates, or remove polyps (growths). If you smoke, try to quit because smoking makes a stuffy nose worse.  Understanding the risks of sleep apnea  In some cases, snoring is not physically harmful, but it can be associated with a more serious condition called sleep apnea. Some common symptoms of sleep apnea include:  · Loud, frequent snoring  · Heavy daytime drowsiness  · Difficulty breathing during sleep  · Headaches upon awakening  If you are concerned that you might have sleep apnea, talk with your healthcare provider about tests and treatments that may help.   Date Last Reviewed: 8/9/2015  © 9215-7749 [employer]. [address] [place] [address], [place] [address]. All rights reserved. This information is not intended as a substitute for professional medical care. Always follow your healthcare professional's instructions.

## 2020-07-14 NOTE — PROGRESS NOTES
Subjective:      Patient ID: Matthew Graham Blakemore is a 39 y.o. male.    Chief Complaint: Sleep Apnea    HPI: Matthew Graham Blakemore in clinic follow up for JULIANO with review PSG 6/4/2020 no obstructive sleep apnea AHI 1.2.     Significant periodic leg movements(PLMs) Initial part of night.PLMindex: 28.1/hr . However, no significant  associated arousals.PLMAI was 1.5/hr     RLS bothersome/symptomatic as sleep onset, not bothersome once asleep/during night. March 2020 Ferritin level was normal.  Patient is now interested in treatment.   New order Mirapex 0.25 mg nightly.     [place] [address]    Previous Report Reviewed: lab reports and office notes     Past Medical History: The following portions of the patient's history were reviewed and updated as appropriate:   He  has a past surgical history that includes Hernia repair; Vasectomy (Bilateral, 1/31/2019); Ultrasound guidance (Left, 1/31/2019); and Selective injection of anesthetic agent around lumbar spinal nerve root by transforaminal approach (Left, 3/4/2020).  His family history includes Heart attacks under age 50 in his maternal grandfather; Melanoma in his brother, father, mother, and sister.  He  reports that he has been smoking vaping w/o nicotine and cigarettes. He started smoking about 30 years ago. He has a 6.75 pack-year smoking history. He has never used smokeless tobacco. He reports that he does not drink alcohol or use drugs.  He has a current medication list which includes the following prescription(s): acetaminophen, diazepam, pramipexole, and silver sulfadiazine 1%.  He is allergic to penicillins..    The following portions of the patient's history were reviewed and updated as appropriate: allergies, current medications, past family history, past medical history, past social history, past surgical history and problem list.    Review of Systems   Constitutional: Negative for fever, chills, weight loss, weight gain, activity change, appetite change,  "fatigue and night sweats.   HENT: Negative for postnasal drip, rhinorrhea, sinus pressure, voice change and congestion.    Eyes: Negative for redness and itching.   Respiratory: Negative for snoring, cough, sputum production, chest tightness, shortness of breath, wheezing, orthopnea, asthma nighttime symptoms, dyspnea on extertion, use of rescue inhaler and somnolence.    Cardiovascular: Negative.  Negative for chest pain, palpitations and leg swelling.   Genitourinary: Negative for difficulty urinating and hematuria.   Endocrine: Negative for cold intolerance and heat intolerance.    Musculoskeletal: Negative for arthralgias, gait problem, joint swelling and myalgias.   Skin: Negative.    Gastrointestinal: Negative for nausea, vomiting, abdominal pain and acid reflux.   Neurological: Negative for dizziness, weakness, light-headedness and headaches.   Hematological: Negative for adenopathy. No excessive bruising.   All other systems reviewed and are negative.     Objective:   /80   Pulse 71   Resp 18   Ht 5' 11" (1.803 m)   Wt 102.3 kg (225 lb 8.5 oz)   SpO2 98%   BMI 31.46 kg/m²   Physical Exam  Vitals signs and nursing note reviewed.   Constitutional:       General: He is not in acute distress.     Appearance: Normal appearance. He is well-developed. He is obese. He is not ill-appearing or toxic-appearing.   HENT:      Head: Normocephalic and atraumatic.      Right Ear: External ear normal.      Left Ear: External ear normal.      Nose: Nose normal.      Mouth/Throat:      Mouth: Mucous membranes are moist.      Pharynx: No oropharyngeal exudate.   Eyes:      Conjunctiva/sclera: Conjunctivae normal.   Neck:      Musculoskeletal: Normal range of motion and neck supple.   Cardiovascular:      Rate and Rhythm: Normal rate and regular rhythm.      Heart sounds: Normal heart sounds.   Pulmonary:      Effort: Pulmonary effort is normal.      Breath sounds: Normal breath sounds.   Abdominal:      General: " Bowel sounds are normal.      Palpations: Abdomen is soft.   Musculoskeletal: Normal range of motion.   Skin:     General: Skin is warm and dry.   Neurological:      Mental Status: He is alert and oriented to person, place, and time.      Deep Tendon Reflexes: Reflexes are normal and symmetric.   Psychiatric:         Behavior: Behavior normal. Behavior is cooperative.         Thought Content: Thought content normal.         Judgment: Judgment normal.       Personal Diagnostic Review  6/4/2020 PSG   No Significant Obstructive Sleep apnea(JULIANO): AHI was 1.2 events per hour.  EKG showed no cardiac abnormalities.  Mild Oxygen Desaturation  EEG did not show alpha intrusion.  The patient snored with moderate snoring volume.  Significant periodic leg movements(PLMs) Initial part of night.PLMindex: 28.1/hr . However, no significant  associated arousals.PLMAI was 1.5/hr  No Significant Central Sleep Apnea (CSA)  Normal sleep efficiency, long primary sleep latency, long REM sleep latency and long slow wave latency.  Reduced testing in Supine position may underestimate JULIANO severity  Cheyne Navarrete Respiratory pattern  Biot's Respiratory pattern  Respiratory  There were a total of 7 respiratory disturbances out of which 0 were apneas ( 0 obstructive, 0 mixed, 0 central) and  7 hypopneas. The apnea/hypopnea index (AHI) was 1.2 events/hour. The central sleep apnea index was 0.0  events/hour. The REM AHI was 1.7 events/hour and NREM AHI was 0.9 events/hour. The supine AHI was 3.1  events/hour and the non supine AHI was 0 supine during 38.15% of sleep. Respiratory disturbances were  associated with oxygen desaturation down to a tyron of 89.0% during sleep. The mean oxygen saturation during the  study was 94.8%. The cumulative time under 88% oxygen saturation was 0.0 minutes.  Assessment:     1. Restless leg syndrome    2. Primary snoring    3. JULIANO (obstructive sleep apnea)    4. Restless legs syndrome (RLS)    5. Tobacco abuse       Orders Placed This Encounter   Procedures    Ambulatory referral/consult to Smoking Cessation Program     Standing Status:   Future     Standing Expiration Date:   8/14/2021     Referral Priority:   Routine     Referral Type:   Consultation     Referral Reason:   Specialty Services Required     Requested Specialty:   CTTS     Number of Visits Requested:   1     Plan:     Problem List Items Addressed This Visit     Tobacco abuse     Smoking 3 cigarettes a day, interested in complete cessation. referral to smoking cessation. Complete cessation advised.  Reviewed health risk of continue tobacco and vape nicotine use.     Assistance with smoking cessation was offered, including:  []  Medications  [x]  Counseling  []  Printed Information on Smoking Cessation  [x]  Referral to a Smoking Cessation Program    Patient was counseled regarding smoking for 3-10 minutes.             Relevant Orders    Ambulatory referral/consult to Smoking Cessation Program    Restless legs syndrome (RLS)     Symptomatic at sleep onset, begin Mirapex 0.25 mg nightly, in 7 days may increase to 0.5 mg if needed.          Primary snoring     6/4/2020 PSG No obstructive sleep apnea AHI 1.2   May try Theravent nasal strips, patient not interested in nasal strips  Oral appliance, otc or see dentist, has dentist appointment this week          RESOLVED: JULIANO (obstructive sleep apnea)     6/4/2020 PSG No obstructive sleep apnea AHI 1.2              Other Visit Diagnoses     Restless leg syndrome    -  Primary    Relevant Medications    pramipexole (MIRAPEX) 0.25 MG tablet         Follow up if symptoms worsen or fail to improve. patient request follow up on as needed basis will plan refills on Mirapex next year with primary care provider if he decides to continue treatment for RLS. He typically does not like to take prescription medication, but would like trial of prescription medication for bothersome RLS.

## 2020-07-14 NOTE — ASSESSMENT & PLAN NOTE
Smoking 3 cigarettes a day, interested in complete cessation. referral to smoking cessation. Complete cessation advised.  Reviewed health risk of continue tobacco and vape nicotine use.     Assistance with smoking cessation was offered, including:  []  Medications  [x]  Counseling  []  Printed Information on Smoking Cessation  [x]  Referral to a Smoking Cessation Program    Patient was counseled regarding smoking for 3-10 minutes.

## 2020-07-14 NOTE — ASSESSMENT & PLAN NOTE
Symptomatic at sleep onset, begin Mirapex 0.25 mg nightly, in 7 days may increase to 0.5 mg if needed.

## 2020-07-15 ENCOUNTER — DOCUMENTATION ONLY (OUTPATIENT)
Dept: REHABILITATION | Facility: HOSPITAL | Age: 40
End: 2020-07-15

## 2020-07-15 DIAGNOSIS — G89.29 CHRONIC BILATERAL LOW BACK PAIN WITH LEFT-SIDED SCIATICA: Primary | ICD-10-CM

## 2020-07-15 DIAGNOSIS — M54.42 CHRONIC BILATERAL LOW BACK PAIN WITH LEFT-SIDED SCIATICA: Primary | ICD-10-CM

## 2020-07-15 NOTE — PROGRESS NOTES
Outpatient Therapy Discharge Summary     [id number]    Therapy Diagnosis:        Encounter Diagnoses   Name Primary?    Chronic bilateral low back pain with left-sided sciatica Yes    Lumbar spondylosis      Lumbar radiculopathy       Physician: Anjel Nugent MD     Physician Orders: PT Eval and Treat   Medical Diagnosis from Referral:   M47.816 (ICD-10-CM) - Lumbar spondylosis   M54.16 (ICD-10-CM) - Lumbar radiculopathy      Evaluation Date: 4/14/2020      Date of Last visit: 4/14/2020  Total Visits Received: 1  Cancelled Visits: 0  No Show Visits: 1    Assessment    Goals:  Short Term Goals: 6 weeks NOT MET  1. Recent signs and systems trend is improving in order to progress towards LTG's.  2. Patient will improve lumbar rotation to 75% in order to drive and ride horses.  3. Patient will be independent with HEP in order to further progress and return to maximal function.  4. Pain rating at Worst: 4/10 in order to progress towards increased independence with activity.     Long Term Goals: 12 weeks NOT MET  1. Patient will improve glute med strength to 4+/5 in order to squat down with kids.  2. Patient will improve transverse abdominis strength to 4+/5 in order to work with no difficulty.  3. Patient will improve lumbar flexion to 75% in order to ride horses.   4. Patient will demonstrate normal gait mechanics in order to minimize any compensation and return to PLOF.   5. Patient will self report improvement to 20% limitation on the FOTO Low Back Survey.     Discharge reason: Patient has not attended therapy since 4/14/2020. Patient was set up with virtual visits following his evaluation but did not answer. Patient was contacted multiple times but there was never an answer or call back.     Plan   This patient is discharged from Physical Therapy

## 2020-07-20 ENCOUNTER — TELEPHONE (OUTPATIENT)
Dept: SMOKING CESSATION | Facility: CLINIC | Age: 40
End: 2020-07-20

## 2020-07-20 NOTE — TELEPHONE ENCOUNTER
Spoke with patient by telephone in regards to his missed clinic appointment. He stated that he just tested for Coronavirus and is in quarantine. He states that he is very tired right now and is about to go back to bed. He requested a call back on Wednesday.

## 2020-07-27 ENCOUNTER — TELEPHONE (OUTPATIENT)
Dept: SMOKING CESSATION | Facility: CLINIC | Age: 40
End: 2020-07-27

## 2020-07-27 NOTE — TELEPHONE ENCOUNTER
Attempt to contact patient to reschedule his missed appointment. Recorded message left with return contact information.

## 2020-07-28 ENCOUNTER — OFFICE VISIT (OUTPATIENT)
Dept: INTERNAL MEDICINE | Facility: CLINIC | Age: 40
End: 2020-07-28
Payer: OTHER GOVERNMENT

## 2020-07-28 VITALS
DIASTOLIC BLOOD PRESSURE: 80 MMHG | WEIGHT: 222.88 LBS | HEART RATE: 66 BPM | HEIGHT: 71 IN | TEMPERATURE: 99 F | BODY MASS INDEX: 31.2 KG/M2 | SYSTOLIC BLOOD PRESSURE: 120 MMHG

## 2020-07-28 DIAGNOSIS — M75.02 ADHESIVE CAPSULITIS OF LEFT SHOULDER: Primary | ICD-10-CM

## 2020-07-28 PROCEDURE — 99213 OFFICE O/P EST LOW 20 MIN: CPT | Mod: PBBFAC,PO | Performed by: PHYSICIAN ASSISTANT

## 2020-07-28 PROCEDURE — 99213 PR OFFICE/OUTPT VISIT, EST, LEVL III, 20-29 MIN: ICD-10-PCS | Mod: S$PBB,,, | Performed by: PHYSICIAN ASSISTANT

## 2020-07-28 PROCEDURE — 99213 OFFICE O/P EST LOW 20 MIN: CPT | Mod: S$PBB,,, | Performed by: PHYSICIAN ASSISTANT

## 2020-07-28 PROCEDURE — 99999 PR PBB SHADOW E&M-EST. PATIENT-LVL III: CPT | Mod: PBBFAC,,, | Performed by: PHYSICIAN ASSISTANT

## 2020-07-28 PROCEDURE — 99999 PR PBB SHADOW E&M-EST. PATIENT-LVL III: ICD-10-PCS | Mod: PBBFAC,,, | Performed by: PHYSICIAN ASSISTANT

## 2020-07-28 RX ORDER — KETOROLAC TROMETHAMINE 10 MG/1
10 TABLET, FILM COATED ORAL EVERY 6 HOURS PRN
Qty: 15 TABLET | Refills: 0 | Status: SHIPPED | OUTPATIENT
Start: 2020-07-28 | End: 2020-07-31

## 2020-07-28 NOTE — PROGRESS NOTES
Subjective:       Patient ID: Matthew Graham Blakemore is a 39 y.o. male.    Chief Complaint: Shoulder Pain (l)    Patient comes in today for left shoulder issue     2 days ago pain started.  He reports no injury  Last night worse and woke him from his sleep  Taken naprosyn which happened to help some     He is having issues with his range of motion in that shoulder.  He denies any trauma or prior surgeries.  There is no numbness and tingling to the limb.  Health Maintenance Due   Topic Date Due    Hepatitis C Screening  1980    HIV Screening  08/17/1994    TETANUS VACCINE  08/17/1997    Pneumococcal Vaccine (Medium Risk) (1 of 1 - PPSV23) 08/17/1998       No past medical history on file.    Current Outpatient Medications   Medication Sig Dispense Refill    acetaminophen (TYLENOL) 325 MG tablet Take 500 mg by mouth every 6 (six) hours as needed for Pain.      pramipexole (MIRAPEX) 0.25 MG tablet Take 1 tablet (0.25 mg total) by mouth every evening. 90 tablet 4    diazePAM (VALIUM) 5 MG tablet Take 1 tablet (5 mg total) by mouth every 6 (six) hours as needed (Vertigo). (Patient not taking: Reported on 3/12/2020) 3 tablet 0    ketorolac (TORADOL) 10 mg tablet Take 1 tablet (10 mg total) by mouth every 6 (six) hours as needed for Pain. 15 tablet 0    silver sulfADIAZINE 1% (SILVADENE) 1 % cream Apply topically 2 (two) times daily. (Patient not taking: Reported on 7/14/2020) 30 g 0     No current facility-administered medications for this visit.        Review of Systems   Constitutional: Negative for activity change and unexpected weight change.   HENT: Negative for hearing loss, rhinorrhea and trouble swallowing.    Eyes: Negative for discharge and visual disturbance.   Respiratory: Negative for chest tightness and wheezing.    Cardiovascular: Negative for chest pain and palpitations.   Gastrointestinal: Negative for blood in stool, constipation, diarrhea and vomiting.   Endocrine: Negative for  "polydipsia and polyuria.   Genitourinary: Negative for difficulty urinating, hematuria and urgency.   Musculoskeletal: Negative for arthralgias, joint swelling and neck pain.   Neurological: Negative for weakness and headaches.   Psychiatric/Behavioral: Negative for confusion and dysphoric mood.       Objective:   /80   Pulse 66   Temp 98.8 °F (37.1 °C)   Ht 5' 11" (1.803 m)   Wt 101.1 kg (222 lb 14.2 oz)   BMI 31.09 kg/m²      Physical Exam  Constitutional:       Appearance: Normal appearance.   HENT:      Head: Normocephalic and atraumatic.   Neurological:      Mental Status: He is alert.       there is a frozen shoulder and left side.  He has very limited range of motion due to pain he can only extend the arm about 20° abduction,40 degree adduction  Cannot lift arm over shoulder.  There is no deformities, no rash    Lab Results   Component Value Date    WBC 6.42 01/14/2020    HGB 15.5 01/14/2020    HCT 47.5 01/14/2020     01/14/2020    CHOL 154 06/12/2018    TRIG 102 06/12/2018    HDL 28 (L) 06/12/2018     01/14/2020    K 4.0 01/14/2020     01/14/2020    CREATININE 1.0 01/14/2020    BUN 13 01/14/2020    CO2 27 01/14/2020       Assessment:       1. Adhesive capsulitis of left shoulder        Plan:   Adhesive capsulitis of left shoulder  -     Ambulatory referral/consult to Physical/Occupational Therapy; Future; Expected date: 08/04/2020    Other orders  -     ketorolac (TORADOL) 10 mg tablet; Take 1 tablet (10 mg total) by mouth every 6 (six) hours as needed for Pain.  Dispense: 15 tablet; Refill: 0     He needs to see physical therapy, start NSAID as needed for pain and inflammation.  He can rotate heat and cold.  No need for imaging since there was no injury.    He has some documented arthritic issues and shoulder and past x-rays.        "

## 2020-08-07 ENCOUNTER — PATIENT MESSAGE (OUTPATIENT)
Dept: INTERNAL MEDICINE | Facility: CLINIC | Age: 40
End: 2020-08-07

## 2020-08-07 RX ORDER — KETOCONAZOLE 20 MG/G
CREAM TOPICAL 2 TIMES DAILY
Qty: 60 G | Refills: 0 | Status: ON HOLD | OUTPATIENT
Start: 2020-08-07 | End: 2020-11-18 | Stop reason: HOSPADM

## 2020-08-17 ENCOUNTER — CLINICAL SUPPORT (OUTPATIENT)
Dept: REHABILITATION | Facility: HOSPITAL | Age: 40
End: 2020-08-17
Payer: OTHER GOVERNMENT

## 2020-08-17 DIAGNOSIS — M75.02 ADHESIVE CAPSULITIS OF LEFT SHOULDER: ICD-10-CM

## 2020-08-17 PROCEDURE — 97110 THERAPEUTIC EXERCISES: CPT | Performed by: OCCUPATIONAL THERAPIST

## 2020-08-17 PROCEDURE — 97166 OT EVAL MOD COMPLEX 45 MIN: CPT | Performed by: OCCUPATIONAL THERAPIST

## 2020-08-17 NOTE — PLAN OF CARE
[facility] Occupational Therapy  Initial Evaluation     Date: 8/17/2020  Name: Matthew Graham Blakemore  Clinic Number: [id number]    Therapy Diagnosis:   Encounter Diagnosis   Name Primary?    Adhesive capsulitis of left shoulder      Physician: Darleen Perez PA    Physician Orders: Eval and treat  Medical Diagnosis: M75.02 (ICD-10-CM) - Adhesive capsulitis of left shoulder  Surgical Procedure and Date: NA,   Evaluation Date: 8/17/2020  Insurance Authorization Period Expiration: 12/15/2020  Plan of Care Certification Period: 8/17/2020 to 10/17/2020  Date of Return to MD: None scheduled    Visit # / Visits authorized: 1 / 13  Time In:12:50 pm  Time Out: 1:50 pm   Total Billable Time: 15 minutes    Precautions:  Standard    Subjective     Involved Side: Left shoulder  Dominant Side: Right  Date of Onset: 2 year history of left shoulder and 3 weeks ago he had a flare up  History of Current Condition/Mechanism of Injury: Patient is a right handed 40 year old male with a flare up of his left shoulder pain without trauma, 3 weeks ago. He has been having tolerable pain for a couple of years.He is having trouble sleeping as he lays on his sides and has been taking an antiinflammatory and it is decreasing his symptoms. He is not using modalities at home is only performing rows with the theraband he got from [facility] in Physical therapy.    Imaging: XRAY FINDINGS:  No acute fracture or dislocation.  Tiny osseous bodies project just superior to the glenoid and adjacent to the superolateral humeral head.  Possibly representing calcific tendinosis and/or loose bodies.  Lung parenchyma clear.  Previous Therapy: PT for low back pain and left shoulder pain.    Past Medical History/Physical Systems Review:   Matthew Graham Blakemore  has no past medical history on file.    Matthew Graham Blakemore  has a past surgical history that includes Hernia repair; Vasectomy (Bilateral, 1/31/2019); Ultrasound guidance  (Left, 1/31/2019); and Selective injection of anesthetic agent around lumbar spinal nerve root by transforaminal approach (Left, 3/4/2020).    Richard has a current medication list which includes the following prescription(s): acetaminophen, diazepam, ketoconazole, pramipexole, and silver sulfadiazine 1%.    Review of patient's allergies indicates:   Allergen Reactions    Penicillins Other (See Comments)     unknown        Patient's Goals for Therapy: To decrease left shoulder pain    Pain:  Functional Pain Scale Rating 0-10:   8/10 on average  4/10 at best  10/10 at worst  Location: anterior/posterior left shoulder  Description: Aching, Dull, Sharp, Shooting and horrible pain  Aggravating Factors: Lifting  Easing Factors: anti - inflammatory    Occupation:   - 23 years  Working presently: employed  Duties:     Functional Limitations/Social History:    Previous functional status includes: Independent with all ADLs.     Current FunctionalStatus   Home/Living environment : lives with their family      Limitation of Functional Status as follows:   ADLs/IADLs:     - Feeding: None    - Bathing: Mild difficulty    - Dressing/Grooming: None    - Driving: None     Leisure: None - horse back riding        Objective         Limitation/Restriction for FOTO shoulder Survey    Therapist reviewed FOTO scores for Matthew Graham Blakemore on 8/17/2020.   FOTO documents entered into [employer] - see Media section.    Limitation Score: 51%       Sensation Test: Patient denies any numbness/tingling    Observation/Inspection   Left Right   Anatomic Symmetry normal normal   Bony normal normal   Suparspinatus normal normal   Infraspinatus normal normal   Rhomboid normal normal     Observation/Inspection:  rounded shoulders / normal muscle tone for age/ protracted scapula on the left      Range of Motion:   Right: WNL  Left: limited as follows: (see measurements table below)     (L) UE (R) UE     AROM AROM Norm    Shoulder Flexion   0-180   Shoulder Flexion 90 180 180   Shoulder Abduction 80 180 0-180   Shoulder Extension 50 50 0-50   Shoulder Internal Rotation 90 90 0-90   Shoulder External Rotation 80 90 0-90   Horz Shoulder Adduction 40 40 0-40     ROM Comments:   Pain at end range    Painful Arc:   Patient demonstrates no painful arc in shoulder flexion or abduction      Strength  Shoulder Flexion RUE: 5/5   Shoulder Extension RUE: 5/5   Shoulder Abduction RUE:  5/5   Shoulder Adduction RUE:  5/5   Internal Rotation RUE: 5/5   External Rotation RUE: 5/5   Horizontal Adduction RUE: 5/5   Shoulder Flexion LUE: 4/5   Shoulder Extension LUE: 4/5   Shoulder Abduction LUE: 4/5   Shoulder Abbduction LUE: 5/5   Internal Rotation LUE:  4/5   External Rotation LUE:  4/5   Horizontal Adduction LUE:  4/5     Passive range of motion  Right shoulder flexion/abuction: 170  Left: 160  Right pectoralis muscle tight    Pull Tests:  Abduction: DEFERRED  Ext. Rotation:     Special Tests:  Positive: Int. Rot. and ADD. Impingement  Negative: Apprehension, Empty can test, Speed's Test, belly test and lift off sign      Palpation: (for pain)     Positive: Infraspinatus Region/supraspinatus and biceps tendon     Negative: SC joint, Coracoid process, Anterior Subacromial Space and AC joint    Treatment     Treatment Time In: 1:30 pm  Treatment Time Out: 1:45 pm  Total Treatment time separate from Evaluation time:15 minutes    Richard received the following supervised modalities after being cleared for contradictions for 0 minutes:     Richard received the following direct contact modalities after being cleared for contraindications for 0 minutes:    Richard received the following manual therapy techniques for 5 minutes:   -left upper trapezius and levator scapulae and infraspinatus muscle soft tissue massage    Richard received therapeutic exercises for 13 minutes including:  -Pendular exercises: forward/back and clockwise and counterclock  wise  -cross body stretch  Pectoralis major/minor stretch  Wall stretches to include flexion/extension    Home Exercise Program/Education:  Issued HEP (see patient instructions in EMR) and educated on modality use for pain management . Exercises were reviewed and Richard was able to demonstrate them prior to the end of the session.   Pt received a written copy of exercises to perform at home. Richard demonstrated good  understanding of the education provided.  Pt was advised to perform these exercises free of pain, and to stop performing them if pain occurs.    Patient/Family Education: role of OT, goals for OT, scheduling/cancellations - pt verbalized understanding. Discussed insurance limitations with patient.    Additional Education provided: posture recommendations,activity modification recommendations.    Assessment     Matthew Graham Blakemore is a 40 y.o. male referred to outpatient occupational therapy and presents with a medical diagnosis of left shoulder adhesive capsulitis, resulting in Decreased ROM, Decreased muscle strength, Decreased functional hand use, Increased pain and Joint Stiffness and demonstrates limitations as described in the chart below. Following medical record review it is determined that pt will benefit from occupational therapy services in order to maximize pain free and/or functional use of left shoulder. The following goals were discussed with the patient and patient is in agreement with them as to be addressed in the treatment plan. The patient's rehab potential is Excellent.     Anticipated barriers to occupational therapy: Patient tends to be over aggressive with his left shouler  Pt has no cultural, educational or language barriers to learning provided.    Profile and History Assessment of Occupational Performance Level of Clinical Decision Making Complexity Score   Occupational Profile:   Matthew Graham Blakemore is a 40 y.o. male who lives with their family and is currently  employed Matthew Graham Blakemore has difficulty with  ADLs and IADLs as listed previously, which  affecting his/her daily functional abilities.      Comorbidities:    has no past medical history on file.    Medical and Therapy History Review:   Expanded               Performance Deficits    Physical:  Joint Mobility  Joint Stability  Muscle Power/Strength  Postural Control  Pain    Cognitive:  No Deficits    Psychosocial:    No Deficits     Clinical Decision Making:  low    Assessment Process:  Detailed Assessments    Modification/Need for Assistance:  Minimal-Moderate Modifications/Assistance    Intervention Selection:  Several Treatment Options       moderate  Based on PMHX, co morbidities , data from assessments and functional level of assistance required with task and clinical presentation directly impacting function.       The following goals were discussed with the patient and patient is in agreement with them as to be addressed in the treatment plan.        Goals to be met in 4 weeks:  1) Pt will be independent and report performing HEP to maximize (R) shoulder functional capacity.  2) Pt will increase shoulder PROM in all measured planes of motion by 10 degrees to A with daily task.  3) Pt will report use of home modalities for pain management.  4) Pt will report one degree less of pain with nonuse and with use.  5) Pt will report interrupted sleep no more than twice a night.    _______________  LONG TERM GOALS: 8 WEEKS  Reach a shelf that is at shoulder height Some difficulty to No difficulty Carrying,   Reach across your body to fasten a car's lap strap  (safety belt)  Much difficulty to No difficulty   Place a can of soup (1 lb.) on a shelf at shoulder  height  Some difficulty to No difficulty   Turn a steering wheel in the opposite direction as  your affected arm  Much difficulty to No difficulty      Plan   Certification Period/Plan of care expiration: 8/17/2020 to 10/17/2020..    Outpatient  Occupational Therapy 2 times weekly for 8 weeks to include the following interventions: Manual therapy/joint mobilizations, Modalities for pain management, Therapeutic exercises/activities., Strengthening and posture recommendations.      Antonieta Law, JULIENR

## 2020-08-17 NOTE — PROGRESS NOTES
[facility] Occupational Therapy  Initial Evaluation     Date: 8/17/2020  Name: Matthew Graham Blakemore  Clinic Number: [id number]    Therapy Diagnosis:   Encounter Diagnosis   Name Primary?    Adhesive capsulitis of left shoulder      Physician: Darleen Preez PA    Physician Orders: Eval and treat  Medical Diagnosis: M75.02 (ICD-10-CM) - Adhesive capsulitis of left shoulder  Surgical Procedure and Date: NA,   Evaluation Date: 8/17/2020  Insurance Authorization Period Expiration: 12/15/2020  Plan of Care Certification Period: 8/17/2020 to 10/17/2020  Date of Return to MD: None scheduled    Visit # / Visits authorized: 1 / 13  Time In:12:50 pm  Time Out: 1:50 pm   Total Billable Time: 15 minutes    Precautions:  Standard    Subjective     Involved Side: Left shoulder  Dominant Side: Right  Date of Onset: 2 year history of left shoulder and 3 weeks ago he had a flare up  History of Current Condition/Mechanism of Injury: Patient is a right handed 40 year old male with a flare up of his left shoulder pain without trauma, 3 weeks ago. He has been having tolerable pain for a couple of years.He is having trouble sleeping as he lays on his sides and has been taking an antiinflammatory and it is decreasing his symptoms. He is not using modalities at home is only performing rows with the theraband he got from [facility] in Physical therapy.    Imaging: XRAY FINDINGS:  No acute fracture or dislocation.  Tiny osseous bodies project just superior to the glenoid and adjacent to the superolateral humeral head.  Possibly representing calcific tendinosis and/or loose bodies.  Lung parenchyma clear.  Previous Therapy: PT for low back pain and left shoulder pain.    Past Medical History/Physical Systems Review:   Matthew Graham Blakemore  has no past medical history on file.    Matthew Graham Blakemore  has a past surgical history that includes Hernia repair; Vasectomy (Bilateral, 1/31/2019); Ultrasound guidance  (Left, 1/31/2019); and Selective injection of anesthetic agent around lumbar spinal nerve root by transforaminal approach (Left, 3/4/2020).    Richard has a current medication list which includes the following prescription(s): acetaminophen, diazepam, ketoconazole, pramipexole, and silver sulfadiazine 1%.    Review of patient's allergies indicates:   Allergen Reactions    Penicillins Other (See Comments)     unknown        Patient's Goals for Therapy: To decrease left shoulder pain    Pain:  Functional Pain Scale Rating 0-10:   8/10 on average  4/10 at best  10/10 at worst  Location: anterior/posterior left shoulder  Description: Aching, Dull, Sharp, Shooting and horrible pain  Aggravating Factors: Lifting  Easing Factors: anti - inflammatory    Occupation:   - 23 years  Working presently: employed  Duties:     Functional Limitations/Social History:    Previous functional status includes: Independent with all ADLs.     Current FunctionalStatus   Home/Living environment : lives with their family      Limitation of Functional Status as follows:   ADLs/IADLs:     - Feeding: None    - Bathing: Mild difficulty    - Dressing/Grooming: None    - Driving: None     Leisure: None - horse back riding        Objective         Limitation/Restriction for FOTO shoulder Survey    Therapist reviewed FOTO scores for Matthew Graham Blakemore on 8/17/2020.   FOTO documents entered into [employer] - see Media section.    Limitation Score: 51%       Sensation Test: Patient denies any numbness/tingling    Observation/Inspection   Left Right   Anatomic Symmetry normal normal   Bony normal normal   Suparspinatus normal normal   Infraspinatus normal normal   Rhomboid normal normal     Observation/Inspection:  rounded shoulders / normal muscle tone for age/ protracted scapula on the left      Range of Motion:   Right: WNL  Left: limited as follows: (see measurements table below)     (L) UE (R) UE     AROM AROM Norm    Shoulder Flexion   0-180   Shoulder Flexion 90 180 180   Shoulder Abduction 80 180 0-180   Shoulder Extension 50 50 0-50   Shoulder Internal Rotation 90 90 0-90   Shoulder External Rotation 80 90 0-90   Horz Shoulder Adduction 40 40 0-40     ROM Comments:   Pain at end range    Painful Arc:   Patient demonstrates no painful arc in shoulder flexion or abduction      Strength  Shoulder Flexion RUE: 5/5   Shoulder Extension RUE: 5/5   Shoulder Abduction RUE:  5/5   Shoulder Adduction RUE:  5/5   Internal Rotation RUE: 5/5   External Rotation RUE: 5/5   Horizontal Adduction RUE: 5/5   Shoulder Flexion LUE: 4/5   Shoulder Extension LUE: 4/5   Shoulder Abduction LUE: 4/5   Shoulder Abbduction LUE: 5/5   Internal Rotation LUE:  4/5   External Rotation LUE:  4/5   Horizontal Adduction LUE:  4/5     Passive range of motion  Right shoulder flexion/abuction: 170  Left: 160  Right pectoralis muscle tight    Pull Tests:  Abduction: DEFERRED  Ext. Rotation:     Special Tests:  Positive: Int. Rot. and ADD. Impingement  Negative: Apprehension, Empty can test, Speed's Test, belly test and lift off sign      Palpation: (for pain)     Positive: Infraspinatus Region/supraspinatus and biceps tendon     Negative: SC joint, Coracoid process, Anterior Subacromial Space and AC joint    Treatment     Treatment Time In: 1:30 pm  Treatment Time Out: 1:45 pm  Total Treatment time separate from Evaluation time:15 minutes    Richard received the following supervised modalities after being cleared for contradictions for 0 minutes:     Richard received the following direct contact modalities after being cleared for contraindications for 0 minutes:    Richard received the following manual therapy techniques for 5 minutes:   -left upper trapezius and levator scapulae and infraspinatus muscle soft tissue massage    Richard received therapeutic exercises for 13 minutes including:  -Pendular exercises: forward/back and clockwise and counterclock  wise  -cross body stretch  Pectoralis major/minor stretch  Wall stretches to include flexion/extension    Home Exercise Program/Education:  Issued HEP (see patient instructions in EMR) and educated on modality use for pain management . Exercises were reviewed and Richard was able to demonstrate them prior to the end of the session.   Pt received a written copy of exercises to perform at home. Richard demonstrated good  understanding of the education provided.  Pt was advised to perform these exercises free of pain, and to stop performing them if pain occurs.    Patient/Family Education: role of OT, goals for OT, scheduling/cancellations - pt verbalized understanding. Discussed insurance limitations with patient.    Additional Education provided: posture recommendations,activity modification recommendations.    Assessment     Matthew Graham Blakemore is a 40 y.o. male referred to outpatient occupational therapy and presents with a medical diagnosis of left shoulder adhesive capsulitis, resulting in Decreased ROM, Decreased muscle strength, Decreased functional hand use, Increased pain and Joint Stiffness and demonstrates limitations as described in the chart below. Following medical record review it is determined that pt will benefit from occupational therapy services in order to maximize pain free and/or functional use of left shoulder. The following goals were discussed with the patient and patient is in agreement with them as to be addressed in the treatment plan. The patient's rehab potential is Excellent.     Anticipated barriers to occupational therapy: Patient tends to be over aggressive with his left shouler  Pt has no cultural, educational or language barriers to learning provided.    Profile and History Assessment of Occupational Performance Level of Clinical Decision Making Complexity Score   Occupational Profile:   Matthew Graham Blakemore is a 40 y.o. male who lives with their family and is currently  employed Matthew Graham Blakemore has difficulty with  ADLs and IADLs as listed previously, which  affecting his/her daily functional abilities.      Comorbidities:    has no past medical history on file.    Medical and Therapy History Review:   Expanded               Performance Deficits    Physical:  Joint Mobility  Joint Stability  Muscle Power/Strength  Postural Control  Pain    Cognitive:  No Deficits    Psychosocial:    No Deficits     Clinical Decision Making:  low    Assessment Process:  Detailed Assessments    Modification/Need for Assistance:  Minimal-Moderate Modifications/Assistance    Intervention Selection:  Several Treatment Options       moderate  Based on PMHX, co morbidities , data from assessments and functional level of assistance required with task and clinical presentation directly impacting function.       The following goals were discussed with the patient and patient is in agreement with them as to be addressed in the treatment plan.        Goals to be met in 4 weeks:  1) Pt will be independent and report performing HEP to maximize (R) shoulder functional capacity.  2) Pt will increase shoulder PROM in all measured planes of motion by 10 degrees to A with daily task.  3) Pt will report use of home modalities for pain management.  4) Pt will report one degree less of pain with nonuse and with use.  5) Pt will report interrupted sleep no more than twice a night.    _______________  LONG TERM GOALS: 8 WEEKS  Reach a shelf that is at shoulder height Some difficulty to No difficulty Carrying,   Reach across your body to fasten a car's lap strap  (safety belt)  Much difficulty to No difficulty   Place a can of soup (1 lb.) on a shelf at shoulder  height  Some difficulty to No difficulty   Turn a steering wheel in the opposite direction as  your affected arm  Much difficulty to No difficulty      Plan   Certification Period/Plan of care expiration: 8/17/2020 to 10/17/2020..    Outpatient  Occupational Therapy 2 times weekly for 8 weeks to include the following interventions: Manual therapy/joint mobilizations, Modalities for pain management, Therapeutic exercises/activities., Strengthening and posture recommendations.      Antonieta Law, JULIENR

## 2020-08-26 ENCOUNTER — CLINICAL SUPPORT (OUTPATIENT)
Dept: REHABILITATION | Facility: HOSPITAL | Age: 40
End: 2020-08-26
Payer: OTHER GOVERNMENT

## 2020-08-26 DIAGNOSIS — M25.612 STIFFNESS OF LEFT SHOULDER JOINT: ICD-10-CM

## 2020-08-26 DIAGNOSIS — M25.512 ACUTE PAIN OF LEFT SHOULDER: ICD-10-CM

## 2020-08-26 PROCEDURE — 97110 THERAPEUTIC EXERCISES: CPT | Performed by: OCCUPATIONAL THERAPIST

## 2020-08-28 ENCOUNTER — OFFICE VISIT (OUTPATIENT)
Dept: PODIATRY | Facility: CLINIC | Age: 40
End: 2020-08-28
Payer: OTHER GOVERNMENT

## 2020-08-28 ENCOUNTER — PATIENT OUTREACH (OUTPATIENT)
Dept: ADMINISTRATIVE | Facility: OTHER | Age: 40
End: 2020-08-28

## 2020-08-28 VITALS
BODY MASS INDEX: 31.08 KG/M2 | WEIGHT: 222 LBS | HEART RATE: 58 BPM | DIASTOLIC BLOOD PRESSURE: 77 MMHG | SYSTOLIC BLOOD PRESSURE: 120 MMHG | HEIGHT: 71 IN

## 2020-08-28 DIAGNOSIS — M24.573 EQUINUS CONTRACTURE OF ANKLE: ICD-10-CM

## 2020-08-28 DIAGNOSIS — B35.1 ONYCHOMYCOSIS: ICD-10-CM

## 2020-08-28 DIAGNOSIS — B35.3 TINEA PEDIS OF BOTH FEET: Primary | ICD-10-CM

## 2020-08-28 PROCEDURE — 99999 PR PBB SHADOW E&M-EST. PATIENT-LVL III: ICD-10-PCS | Mod: PBBFAC,,, | Performed by: PODIATRIST

## 2020-08-28 PROCEDURE — 99213 OFFICE O/P EST LOW 20 MIN: CPT | Mod: PBBFAC | Performed by: PODIATRIST

## 2020-08-28 PROCEDURE — 99244 OFF/OP CNSLTJ NEW/EST MOD 40: CPT | Mod: 25,S$PBB,, | Performed by: PODIATRIST

## 2020-08-28 PROCEDURE — 99244 PR OFFICE CONSULTATION,LEVEL IV: ICD-10-PCS | Mod: 25,S$PBB,, | Performed by: PODIATRIST

## 2020-08-28 PROCEDURE — 99999 PR PBB SHADOW E&M-EST. PATIENT-LVL III: CPT | Mod: PBBFAC,,, | Performed by: PODIATRIST

## 2020-08-28 RX ORDER — TERBINAFINE HYDROCHLORIDE 250 MG/1
250 TABLET ORAL DAILY
Qty: 14 TABLET | Refills: 0 | Status: SHIPPED | OUTPATIENT
Start: 2020-08-28 | End: 2020-12-04 | Stop reason: ALTCHOICE

## 2020-08-28 RX ORDER — CLOTRIMAZOLE AND BETAMETHASONE DIPROPIONATE 10; .64 MG/G; MG/G
CREAM TOPICAL 2 TIMES DAILY
Qty: 45 G | Refills: 1 | Status: SHIPPED | OUTPATIENT
Start: 2020-08-28 | End: 2020-12-04 | Stop reason: ALTCHOICE

## 2020-08-28 NOTE — LETTER
August 28, 2020      SARAY Garza  [id number] [facility]  [place] [id number]           [facility]  [address] [facility]  [place] [address]  Phone: [phone]  Fax: [phone]          Patient: Matthew Graham Blakemore   MR Number: [id number]   YOB: 1980   Date of Visit: 8/28/2020       Dear Darleen Perez:    Thank you for referring Matthew Blakemore to me for evaluation. Attached you will find relevant portions of my assessment and plan of care.    If you have questions, please do not hesitate to call me. I look forward to following Matthew Blakemore along with you.    Sincerely,    Alvaro Elias DPM    Enclosure  CC:  No Recipients    If you would like to receive this communication electronically, please contact [email or web address] or [phone] to request more information on [employer] access.    For providers and/or their staff who would like to refer a patient to [facility], please contact us through our one-stop-shop provider referral line, [facility] , at [phone].    If you feel you have received this communication in error or would no longer like to receive these types of communications, please e-mail [email or web address]

## 2020-08-28 NOTE — PROGRESS NOTES
Subjective:     Patient ID: Matthew Graham Blakemore is a 40 y.o. male.    Chief Complaint: Foot Pain (c/o bilateral cramping pains to arch of foot. rates pain 8/10. wears boots with socks. last seen on 07/28/20 with Darleen Durand)    Richard is a 40 y.o. male who presents to the podiatry clinic  with complaint of  bilateral foot pain. Patient states he sleeps with toes scrunched up and when he steps down in the morning it takes a minute for the pain to subside. Patient also complains of recurrent athletes feet. Patient states he has used OTC for years. Patient states the cream prescribed did nothing. Patients rates pain 8/10 on pain scale. Patient has no other pedal complaints at this time.     Patient Active Problem List   Diagnosis    Unwanted fertility    Lumbar radiculopathy    Restless legs syndrome (RLS)    Tobacco abuse    Primary snoring    Acute pain of left shoulder    Stiffness of left shoulder joint       Medication List with Changes/Refills   New Medications    CLOTRIMAZOLE-BETAMETHASONE 1-0.05% (LOTRISONE) CREAM    Apply topically 2 (two) times daily.    TERBINAFINE HCL (LAMISIL) 250 MG TABLET    Take 1 tablet (250 mg total) by mouth once daily.   Current Medications    ACETAMINOPHEN (TYLENOL) 325 MG TABLET    Take 500 mg by mouth every 6 (six) hours as needed for Pain.    DIAZEPAM (VALIUM) 5 MG TABLET    Take 1 tablet (5 mg total) by mouth every 6 (six) hours as needed (Vertigo).    KETOCONAZOLE (NIZORAL) 2 % CREAM    Apply topically 2 (two) times daily.    PRAMIPEXOLE (MIRAPEX) 0.25 MG TABLET    Take 1 tablet (0.25 mg total) by mouth every evening.       Review of patient's allergies indicates:   Allergen Reactions    Penicillins Other (See Comments)     unknown       Past Surgical History:   Procedure Laterality Date    HERNIA REPAIR      SELECTIVE INJECTION OF ANESTHETIC AGENT AROUND LUMBAR SPINAL NERVE ROOT BY TRANSFORAMINAL APPROACH Left 3/4/2020    Procedure: BLOCK, SPINAL NERVE ROOT,  LUMBAR, SELECTIVE, TRANSFORAMINAL APPROACH Left L4-5, L5-S1 RN IV sedation;  Surgeon: Anjel Nugent MD;  Location: [facility];  Service: Pain Management;  Laterality: Left;    ULTRASOUND GUIDANCE Left 1/31/2019    Procedure: ULTRASOUND GUIDANCE;  Surgeon: Travis Flowers IV, MD;  Location: [facility] OR;  Service: Urology;  Laterality: Left;  Doppler ultrasound of left testicular artery    VASECTOMY Bilateral 1/31/2019    Procedure: VASECTOMY;  Surgeon: Travis Flowers IV, MD;  Location: [facility] OR;  Service: Urology;  Laterality: Bilateral;   Vasectomy, bilateral with excision and scrotal exploration on left side; difficult       Family History   Problem Relation Age of Onset    Heart attacks under age 50 Maternal Grandfather     Melanoma Mother     Melanoma Father     Melanoma Sister     Melanoma Brother        Social History     Socioeconomic History    Marital status:      Spouse name: Not on file    Number of children: Not on file    Years of education: Not on file    Highest education level: Not on file   Occupational History    Not on file   Social Needs    Financial resource strain: Not very hard    Food insecurity     Worry: Never true     Inability: Never true    Transportation needs     Medical: No     Non-medical: No   Tobacco Use    Smoking status: Current Every Day Smoker     Packs/day: 0.25     Years: 27.00     Pack years: 6.75     Types: Vaping w/o nicotine, Cigarettes     Start date: 1990    Smokeless tobacco: Never Used   Substance and Sexual Activity    Alcohol use: No     Frequency: Monthly or less     Drinks per session: 1 or 2     Binge frequency: Never    Drug use: No    Sexual activity: Not Currently     Partners: Female   Lifestyle    Physical activity     Days per week: 4 days     Minutes per session: 30 min    Stress: Not at all   Relationships    Social connections     Talks on phone: More than three times a week     Gets together: Twice a week     Attends  "Congregational service: Not on file     Active member of club or organization: Yes     Attends meetings of clubs or organizations: 1 to 4 times per year     Relationship status:    Other Topics Concern    Not on file   Social History Narrative    Live w/ spouse and  5 minor children        Vitals:    08/28/20 0923   BP: 120/77   Pulse: (!) 58   Weight: 100.7 kg (222 lb)   Height: 5' 11" (1.803 m)   PainSc:   8   PainLoc: Foot       Review of Systems   Constitutional: Negative for chills and fever.   Respiratory: Negative for shortness of breath.    Cardiovascular: Negative.  Negative for chest pain, palpitations, orthopnea, claudication and leg swelling.   Gastrointestinal: Negative for diarrhea, nausea and vomiting.   Musculoskeletal: Negative for joint pain.   Skin: Positive for itching and rash.   Neurological: Negative.  Negative for dizziness, tingling, sensory change, focal weakness and weakness.   Endo/Heme/Allergies: Negative.    Psychiatric/Behavioral: Negative.              Objective:      PHYSICAL EXAM: Apperance: Alert and orient in no distress,well developed, and with good attention to grooming and body habits  Patient presents ambulating in lace up boots([employer]).  Lower Extremity Exam  VASCULAR: Dorsalis pedis pulses 2/4 bilateral and Posterior Tibial pulses 2/4 bilateral. Capillary fill time <4 seconds bilateral. No edema observed bilateral. Varicosities absent bilateral. Skin temperature of the lower extremities is warm to warm, proximal to distal. Hair growth WNL bilateral.  DERMATOLOGICAL: No skin rashes, subcutaneous nodules, lesions, or ulcers observed bilateral. Dry and scaly skin noted plantar bilateral in moccasin distrubution.   NEUROLOGICAL: Light touch, sharp-dull, proprioception all present and equal bilaterally.    MUSCULOSKELETAL: Muscle strength 5/5 for all foot inverters, everters, plantarflexors, and dorsiflexors bilateral. Ankle joints bilateral shows decreased ROM. bilateral " ankle ROM shows greater decrease in dorsiflexion with knee extended. Ankle joint ROM is pain free and without crepitus bilateral. No pain to palpation bilateral plantar medial tubercle. Plantar medial aspect of bilateral heels shows no tenderness to palpation. No pain on medial-lateral compression of the calcaneus.           Assessment:       Encounter Diagnoses   Name Primary?    Tinea pedis of both feet Yes    Equinus contracture of ankle     Onychomycosis          Plan:   Tinea pedis of both feet  -     clotrimazole-betamethasone 1-0.05% (LOTRISONE) cream; Apply topically 2 (two) times daily.  Dispense: 45 g; Refill: 1    Equinus contracture of ankle    Onychomycosis  -     Ambulatory referral/consult to Podiatry  -     terbinafine HCL (LAMISIL) 250 mg tablet; Take 1 tablet (250 mg total) by mouth once daily.  Dispense: 14 tablet; Refill: 0      I counseled the patient on his conditions, regarding findings of my examination, my impressions, and usual treatment plan.   I explained to the patient that etiology and treatment options for heel pain including rest,  ice messages, stretching exercises, strappings/tappings, NSAID's, injections, new shoegear with orthotic inserts, and/or surgical treatment.   I gave written and verbal instructions on heel cord stretching and this was demonstrated for the patient. Patient expressed understanding.  Discuss treatment options for tinea pedis.  I explained that fungus lives in a warm dark moist environment and therefore patient should make every attempt to keep feet clean and dry.  We discussed drying feet thoroughly after shower particularly between the toes.   Patient instructed to spray all shoes with Lysol disinfectant spray and let dry before wearing. Patient instructed to wash all socks in hot water and bleach.  We discussed using Lamisil for tinea pedis. This drug offers a fairly high but not universal cure rate. A 2-4 week treatment course is recommended. The  patient is aware that rare cases of liver injury have been reported; and agrees to have a liver function tests performed. The symptoms of liver disease have been discussed; call if such occurs. Other side effects, such as headaches and rashes, have also been discussed.  Ordered liver function test.  Prescribed Lamisil 250mg to be taken once daily with food. Patient was instructed on dosing information. Discontinue if adverse effects occur   Prescribed Lotrisone cream to be applied twice daily to areas of feet.   Patient to return if symptoms persists or worsen.                    Alvaro Elias DPM  [facility]

## 2020-09-22 ENCOUNTER — CLINICAL SUPPORT (OUTPATIENT)
Dept: REHABILITATION | Facility: HOSPITAL | Age: 40
End: 2020-09-22
Payer: OTHER GOVERNMENT

## 2020-09-22 DIAGNOSIS — R29.898 SHOULDER WEAKNESS: ICD-10-CM

## 2020-09-22 PROCEDURE — 97110 THERAPEUTIC EXERCISES: CPT | Performed by: OCCUPATIONAL THERAPIST

## 2020-09-22 NOTE — PROGRESS NOTES
OCCUPATIONAL THERAPY DAILY NOTE    Name: Richard Vuongmore  Clinic Number: [id number]  Therapy Diagnosis:        Encounter Diagnosis   Name Primary?    Adhesive capsulitis of left shoulder        Physician: Darleen Perez PA     Physician Orders: Eval and treat  Medical Diagnosis: M75.02 (ICD-10-CM) - Adhesive capsulitis of left shoulder  Surgical Procedure and Date: NA,   Evaluation Date: 8/17/2020  Insurance Authorization Period Expiration: 12/15/2020  Plan of Care Certification Period: 8/17/2020 to 10/17/2020  Date of Return to MD: None scheduled     Visit # / Visits authorized: 3 / 13  Time In 2:15  pm  Time Out:3:00  pm   Total Billable Time: 45 minutes     Precautions:  Standard    Visit Date: 9/22/2020    SUBJECTIVE     Today, pt reports: improvement.  He was compliant with home exercise program.  Response to previous treatment:decreased shoulder pain.  Functional change: decreased functional usage.    Pre-Treatment Pain: 0/10  Post-Treatment Pain: 0/10  Location: anterior subacromial space.with activity    OBJECTIVE:8/26/2020  Range of Motion:     Left: limited as follows: (see measurements table below)       (L) UE (R) UE       AROM AROM Norm   Shoulder Flexion     0-180   Shoulder Flexion 90 to 150 180 180   Shoulder Abduction 80 to 150 180 0-180   Shoulder Extension 50 50 0-50   Shoulder Internal Rotation 90 90 0-90   Shoulder External Rotation 80 to 85 90 0-90   Horz Shoulder Adduction 40 40 0-40      ROM Comments:   Pain at end range    TREATMENT     Richard received therapeutic exercises to develop strength, endurance, ROM, flexibility, posture and core stabilization for 40 minutes including:    Exercise 9/22/2020   ISOMETRIC EXERCISES: 10/5 second holds  1. Shoulder flexion/extension  3. Shoulder external/internal rotation WITH BAND   Bilateral extension red theraband 20 repetitions   Bilateral rows red theraband 20 repetitions   Pendular exercises    Shoulder stretches: flexion/functional  internal rotation 3/30 second holds   Prone rows 2# at 45 Degrees 2 sets 10   Closed chain exercises: ball on wall 1 minute each   Shoulder flexion with elbow flexion with 1# weight    Shoulder abduction with elbow flexion no weight.    Side lying external rotation: 1#  10-20 repetitions With scapular depression/retraction        With scapular depression/retraction       Richard received the following manual therapy techniques: Soft tissue Mobilization were applied to the: shoulder for 5 minutes, including:  STM of left upper trapezius, levator scapulae , triceps and infraspinatus       Richard participated in neuromuscular re-education activities to improve: Kinesthetic and Posture for 5 minutes. The following activities were included:    Exercise 9/22/2020   Scapular depression with retraction With shoulder flexion at 90 and elbow flexion at 90                                   Richard received the following supervised modalities after being cleared for contradictions:     Richard received hot pack for 0 minutes..    Home Exercises Provided and Patient Education Provided     Education/Self-Care provided: (5 minutes)   Patient educated on biomechanical justification for therapeutic exercise and importance of compliance with HEP in order to improve overall impairments and QOL    Patient educated on postural awareness    Patient educated on proper ergonomics .      Home Exercises Provided: yes.  Exercises were reviewed and Richard was able to demonstrate them prior to the end of the session.  Richard demonstrated good  understanding of the education provided.     See EMR under Patient Instructions for exercises provided 8/26/2020.    ASSESSMENT   Pt tolerated manual therapy well with reports of decreased pain and tension in musculature following intervention. Pt tolerated exercise well with reports of increased fatigue but no increased pain. Pt demonstrated good understanding of exercises and required minimal  cueing to maintain proper form.  Patient demonstrates decreased proximal muscle strength and compensates with his upper trapezius muscle. He has pain with rotator cuff muscle strengthening exercises.    Richard is progressing well towards his goals.   Pt prognosis is Excellent.     Pt will continue to benefit from skilled outpatient occupational therapy to address the deficits listed in the problem list box on initial evaluation, provide pt/family education and to maximize pt's level of independence in the home and community environment.     Pt's spiritual, cultural and educational needs considered and pt agreeable to plan of care and goals.     Anticipated barriers to occupational therapy: None    Goals:   Goals to be met in 4 weeks:  1) Pt will be independent and report performing HEP to maximize (R) shoulder functional capacity.  2) Pt will increase shoulder PROM in all measured planes of motion by 10 degrees to A with daily task.  3) Pt will report use of home modalities for pain management.  4) Pt will report one degree less of pain with nonuse and with use.  5) Pt will report interrupted sleep no more than twice a night. Met 8/26/2020     _______________  LONG TERM GOALS: 8 WEEKS  Reach a shelf that is at shoulder height Some difficulty to No difficulty Carrying,   Reach across your body to fasten a car's lap strap  (safety belt)  Much difficulty to No difficulty   Place a can of soup (1 lb.) on a shelf at shoulder  height  Some difficulty to No difficulty   Turn a steering wheel in the opposite direction as  your affected arm  Much difficulty to No difficulty Met 8/26/2020       PLAN   Continue Plan of Care (POC) and progress per patient tolerance.    Antonieta Law, JULIENR, WOODY

## 2020-09-23 PROBLEM — R29.898 SHOULDER WEAKNESS: Status: ACTIVE | Noted: 2020-09-23

## 2020-09-24 ENCOUNTER — CLINICAL SUPPORT (OUTPATIENT)
Dept: REHABILITATION | Facility: HOSPITAL | Age: 40
End: 2020-09-24
Payer: OTHER GOVERNMENT

## 2020-09-24 DIAGNOSIS — R29.898 SHOULDER WEAKNESS: ICD-10-CM

## 2020-09-24 PROCEDURE — 97110 THERAPEUTIC EXERCISES: CPT | Performed by: OCCUPATIONAL THERAPIST

## 2020-09-24 NOTE — PROGRESS NOTES
OCCUPATIONAL THERAPY DAILY NOTE    Name: Richard Vuongmore  Clinic Number: [id number]  Therapy Diagnosis:        Encounter Diagnosis   Name Primary?    Adhesive capsulitis of left shoulder        Physician: Darleen Perez PA     Physician Orders: Eval and treat  Medical Diagnosis: M75.02 (ICD-10-CM) - Adhesive capsulitis of left shoulder  Surgical Procedure and Date: NA,   Evaluation Date: 8/17/2020  Insurance Authorization Period Expiration: 12/15/2020  Plan of Care Certification Period: 8/17/2020 to 10/17/2020  Date of Return to MD: easier to turn steering wheel     Visit # / Visits authorized: 4 / 13  Time In 2:15  pm  Time Out:3:00  pm   Total Billable Time: 45 minutes     Precautions:  Standard    Visit Date: 9/24/2020    SUBJECTIVE     Today, pt reports: no pain.  He was compliant with home exercise program.  Response to previous treatment:decreased shoulder pain.  Functional change: increased functional ability    Pre-Treatment Pain: 0/10  Post-Treatment Pain: 0/10  Location: anterior subacromial space.with activity    OBJECTIVE:8/26/2020  Range of Motion:     Left: limited as follows: (see measurements table below)       (L) UE (R) UE       AROM AROM Norm   Shoulder Flexion     0-180   Shoulder Flexion 90 to 150 180 180   Shoulder Abduction 80 to 150 180 0-180   Shoulder Extension 50 50 0-50   Shoulder Internal Rotation 90 90 0-90   Shoulder External Rotation 80 to 85 90 0-90   Horz Shoulder Adduction 40 40 0-40      ROM Comments:   Pain at end range    TREATMENT     Richard received therapeutic exercises to develop strength, endurance, ROM, flexibility, posture and core stabilization for 35 minutes including:    Exercise 9/24/2020   ISOMETRIC EXERCISES: 10/5 second holds  1. Shoulder flexion/extension  3. Shoulder external/internal rotation WITH BAND   Bilateral extension and external rotation red theraband 20 repetitions   Bilateral rows red theraband 20 repetitions   Pendular exercises    Shoulder  stretches: flexion/functional internal rotation 3/30 second holds   Prone rows 2# at 45 Degrees 2 sets 10   Closed chain exercises: ball on wall 1 minute each   Shoulder flexion with elbow flexion with 1# weight  Scapular stabilization exercises: flexion and abduction 30-60 degrees  Shoulder abduction with elbow flexion no weight.    Side lying external rotation: 2#  10-20 repetitions With scapular depression/retraction        20 repetitions  With scapular depression/retraction       Richard received the following manual therapy techniques: Soft tissue Mobilization were applied to the: shoulder for 5 minutes, including:  STM of left upper trapezius, levator scapulae , triceps and infraspinatus       Richard participated in neuromuscular re-education activities to improve: Kinesthetic and Posture for 5 minutes. The following activities were included:    Exercise 9/24/2020   Scapular depression with retraction With shoulder flexion at 90 and elbow flexion at 90                                   Richard received the following supervised modalities after being cleared for contradictions:     Richard received hot pack for 0 minutes..    Home Exercises Provided and Patient Education Provided     Education/Self-Care provided: (5 minutes)   Patient educated on biomechanical justification for therapeutic exercise and importance of compliance with HEP in order to improve overall impairments and QOL    Patient educated on postural awareness    Patient educated on proper ergonomics .      Home Exercises Provided: yes.  Exercises were reviewed and Richard was able to demonstrate them prior to the end of the session.  Richard demonstrated good  understanding of the education provided.     See EMR under Patient Instructions for exercises provided 8/26/2020.    ASSESSMENT   Pt tolerated manual therapy well with reports of decreased pain and tension in musculature following intervention. Pt tolerated exercise well with reports  of increased fatigue but no increased pain. Pt demonstrated good understanding of exercises and required minimal cueing to maintain proper form.     Richard is progressing well towards his goals.   Pt prognosis is Excellent.     Pt will continue to benefit from skilled outpatient occupational therapy to address the deficits listed in the problem list box on initial evaluation, provide pt/family education and to maximize pt's level of independence in the home and community environment.     Pt's spiritual, cultural and educational needs considered and pt agreeable to plan of care and goals.     Anticipated barriers to occupational therapy: None    Goals:   Goals to be met in 4 weeks:  1) Pt will be independent and report performing HEP to maximize (R) shoulder functional capacity.  2) Pt will increase shoulder PROM in all measured planes of motion by 10 degrees to A with daily task.  3) Pt will report use of home modalities for pain management.  4) Pt will report one degree less of pain with nonuse and with use.  5) Pt will report interrupted sleep no more than twice a night. Met 8/26/2020     _______________  LONG TERM GOALS: 8 WEEKS  Reach a shelf that is at shoulder height Some difficulty to No difficulty Carrying,   Reach across your body to fasten a car's lap strap  (safety belt)  Much difficulty to No difficulty   Place a can of soup (1 lb.) on a shelf at shoulder  height  Some difficulty to No difficulty   Turn a steering wheel in the opposite direction as  your affected arm  Much difficulty to No difficulty Met 8/26/2020       PLAN   Continue Plan of Care (POC) and progress per patient tolerance.    Antonieta Law, KHALIDA, WOOYD

## 2020-10-05 ENCOUNTER — PATIENT MESSAGE (OUTPATIENT)
Dept: INTERNAL MEDICINE | Facility: CLINIC | Age: 40
End: 2020-10-05

## 2020-10-05 RX ORDER — KETOROLAC TROMETHAMINE 10 MG/1
10 TABLET, FILM COATED ORAL EVERY 6 HOURS
Qty: 20 TABLET | Refills: 0 | Status: SHIPPED | OUTPATIENT
Start: 2020-10-05 | End: 2020-10-10

## 2020-10-21 ENCOUNTER — PATIENT MESSAGE (OUTPATIENT)
Dept: INTERNAL MEDICINE | Facility: CLINIC | Age: 40
End: 2020-10-21

## 2020-10-21 DIAGNOSIS — M54.16 LUMBAR RADICULOPATHY: Primary | ICD-10-CM

## 2020-11-03 ENCOUNTER — PATIENT OUTREACH (OUTPATIENT)
Dept: ADMINISTRATIVE | Facility: OTHER | Age: 40
End: 2020-11-03

## 2020-11-05 ENCOUNTER — TELEPHONE (OUTPATIENT)
Dept: PAIN MEDICINE | Facility: CLINIC | Age: 40
End: 2020-11-05

## 2020-11-09 ENCOUNTER — OFFICE VISIT (OUTPATIENT)
Dept: PAIN MEDICINE | Facility: CLINIC | Age: 40
End: 2020-11-09
Payer: OTHER GOVERNMENT

## 2020-11-09 VITALS
BODY MASS INDEX: 31.64 KG/M2 | WEIGHT: 226 LBS | HEIGHT: 71 IN | SYSTOLIC BLOOD PRESSURE: 125 MMHG | DIASTOLIC BLOOD PRESSURE: 84 MMHG | HEART RATE: 81 BPM | RESPIRATION RATE: 18 BRPM

## 2020-11-09 DIAGNOSIS — M47.816 LUMBAR SPONDYLOSIS: ICD-10-CM

## 2020-11-09 DIAGNOSIS — R29.898 LEFT LEG WEAKNESS: ICD-10-CM

## 2020-11-09 DIAGNOSIS — M54.16 LEFT LUMBAR RADICULOPATHY: Primary | ICD-10-CM

## 2020-11-09 DIAGNOSIS — M54.16 LUMBAR RADICULOPATHY: ICD-10-CM

## 2020-11-09 PROCEDURE — 99214 OFFICE O/P EST MOD 30 MIN: CPT | Mod: S$PBB,,, | Performed by: PHYSICIAN ASSISTANT

## 2020-11-09 PROCEDURE — 99214 OFFICE O/P EST MOD 30 MIN: CPT | Mod: PBBFAC | Performed by: PHYSICIAN ASSISTANT

## 2020-11-09 PROCEDURE — 99214 PR OFFICE/OUTPT VISIT, EST, LEVL IV, 30-39 MIN: ICD-10-PCS | Mod: S$PBB,,, | Performed by: PHYSICIAN ASSISTANT

## 2020-11-09 PROCEDURE — 99999 PR PBB SHADOW E&M-EST. PATIENT-LVL IV: CPT | Mod: PBBFAC,,, | Performed by: PHYSICIAN ASSISTANT

## 2020-11-09 PROCEDURE — 99999 PR PBB SHADOW E&M-EST. PATIENT-LVL IV: ICD-10-PCS | Mod: PBBFAC,,, | Performed by: PHYSICIAN ASSISTANT

## 2020-11-09 NOTE — LETTER
November 11, 2020      SARAY Garza  [address] [facility]  [place] [id number]           [facility]  [address] [facility]  [place] [address]  Phone: [phone]  Fax: [phone]          Patient: Matthew Graham Blakemore   MR Number: [id number]   YOB: 1980   Date of Visit: 11/9/2020       Dear Darleen Perez:    Thank you for referring Matthew Blakemore to me for evaluation. Attached you will find relevant portions of my assessment and plan of care.    If you have questions, please do not hesitate to call me. I look forward to following Matthew Blakemore along with you.    Sincerely,    Marsha Kapoor PA-C    Enclosure  CC:  No Recipients    If you would like to receive this communication electronically, please contact [email or web address] or [phone] to request more information on [employer] access.    For providers and/or their staff who would like to refer a patient to [facility], please contact us through our one-stop-shop provider referral line, [facility], at [phone].    If you feel you have received this communication in error or would no longer like to receive these types of communications, please e-mail [email or web address]

## 2020-11-09 NOTE — PROGRESS NOTES
Chief Pain Complaint:  Low Back Pain   Left Leg Numbness     Interval History (11/9/2020): Patient was last seen on 4/6/20. At that visit, he was doing better since lumbar ART. Pain has returned. Patient reports pain as 8/10 today.    History of Present Illness:   Matthew Graham Blakemore is a 40 y.o. male  who is presenting with a chief complaint of Low Back Pain. The patient began experiencing this problem insidiously, and the pain has been gradually worsening. The pain is described as throbbing, shooting, burning and electrical and is located in the bilateral lumbar spine L>R. Pain is intermittent and lasts hours. The pain radiates to  left leg L4, L5 distribution . The patient rates his pain a 2 out of ten and interferes with activities of daily living a 2 out of ten. Numbness is the major concern. Pain is exacerbated by flexion of the lumbar spine, and is improved by rest. Patient reports no prior trauma, no prior spinal surgery     - pertinent negatives: No fever, No chills, No weight loss, No bladder dysfunction, No bowel dysfunction, No saddle anesthesia  - pertinent positives: left leg weakness . Some urge incontinence, inability to maintain an erection.   - medications, other therapies tried (physical therapy, injections):     >> NSAIDs, Tylenol, oxycodone and flexeril    >> Has previously undergone Physical Therapy    >> Has previously undergone spinal injection/s    - Left L4-5, L5-S1 TFESI on 3/4/2020 with 80% pain relief.       Imaging / Labs / Studies (reviewed on 11/9/2020):    Results for orders placed during the hospital encounter of 02/11/20   MRI Lumbar Spine W WO Contrast    Narrative TECHNIQUE:  Multiplanar, multisequence MR images were acquired from the thoracolumbar junction to the sacrum before and after administration of 10 cc Gadavist intravenous contrast material.    COMPARISON:  None.    FINDINGS:  Alignment: Normal.    Vertebrae: Normal marrow signal. No fracture.    Discs: Degenerative  disc space narrowing and desiccation at L4-5 and L5-S1.    Cord: Normal.  Conus terminates at L1.    Degenerative findings:    T12-L1: No significant foraminal narrowing or central canal stenosis.    L1-L2: No significant foraminal narrowing or central canal stenosis.    L2-L3: No significant foraminal narrowing or central canal stenosis.    L3-L4: No significant foraminal narrowing or central canal stenosis.    L4-L5: Broad-based posterior disc bulge with superimposed central/left paracentral disc protrusion which indents the anterolateral left side of the thecal sac.  Resultant moderate left foraminal narrowing.  No significant central canal stenosis.    L5-S1: Broad-based posterior disc bulge with superimposed large left paracentral/foraminal disc protrusion and left paracentral annular tear.  Resultant moderate to marked left foraminal narrowing.  Indentation of the anterolateral left side of the thecal sac without significant central canal stenosis.    Paraspinal muscles & soft tissues: Unremarkable.  No abnormal enhancement.      Impression Multilevel degenerative disc disease involving L4-5 and L5-S1 levels as noted.         Review of Systems:  CONSTITUTIONAL: patient denies any fever, chills, or weight loss  SKIN: patient denies any rash or itching  RESPIRATORY: patient denies having any shortness of breath  GASTROINTESTINAL: patient denies having any diarrhea, constipation, or bowel incontinence  GENITOURINARY: patient denies having any abnormal bladder function    MUSCULOSKELETAL:  - patient complains of the above noted pain/s (see chief pain complaint)    NEUROLOGICAL:   - pain as above  - strength in Lower extremities is decreased, on the LEFT  - sensation in Lower extremities is intact, BILATERALLY  - patient denies any loss of bowel or bladder control      PSYCHIATRIC: patient denies any change in mood    Other:  All other systems reviewed and are negative      Physical Exam:  Vitals:    11/09/20 1429  "  BP: 125/84   Pulse: 81   Resp: 18   Weight: 102.5 kg (226 lb)   Height: 5' 11" (1.803 m)   PainSc:   8   PainLoc: Back    (reviewed on 11/9/2020)    General: alert and oriented, in no apparent distress.  Gait: normal gait.  Skin: no rashes, no discoloration, no obvious lesions  HEENT: normocephalic, atraumatic. Pupils equal and round.  Cardiovascular: no significant peripheral edema present.  Respiratory: without use of accessory muscles of respiration.    Musculoskeletal - Lumbar Spine:  - Pain on flexion of lumbar spine: Present   - Pain on extension of lumbar spine: Absent  - Lumbar facet loading: Absent  - TTP over the lumbar facet joints: Absent  - TTP over the lumbar paraspinals: {Present  - TTP over the SI joints: Absent   - Straight Leg Raise: Positive on the left  - RUTH/ Kane's: Negative    Neuro - Lower Extremities:  - BLE Strength: R/L: HF: 5/4, HE: 5/5, KF: 5/5; KE: 5/4; FE: 5/5; FF: 5/5  - Extremity Reflexes: Brisk and symmetric throughout  - Sensory: Sensation to light touch intact bilaterally      Psych:  Mood and affect is appropriate              Assessment:  Matthew Graham Blakemore is a 40 y.o. year old male who is presenting with   Encounter Diagnoses   Name Primary?    Left lumbar radiculopathy Yes    Lumbar spondylosis     Left leg weakness     Lumbar radiculopathy          Plan:  1. Interventional:   - Schedule Left L4-5 + L5-S1 TFESI.  Patient is not taking prescription blood thinners or ASA.  Patient will be instructed to stop all ASA products, NSAIDs, tumeric, fish oil, and Vitamin E.      He last had Left L4-5 + L5-S1 TFESI on 3/4/2020 with 80% pain relief.     2. Pharmacologic: Continue Tylenol 500mg PRN. Consider Gabapetnin but patient wants to hold off for now.     3. Rehabilitative: Encouraged regular exercise. Continue exercises and activities as tolerated.     4. Diagnostic:None for now.    5.  Follow up: 4 weeks post-injection  - will send online ticket scheduling on " MyChart    - This condition does not require this patient to take time off of work, and the primary goal of our Pain Management services is to improve the patient's functional capacity.   - I discussed the risks, benefits, and alternatives to potential treatment options. All questions and concerns were fully addressed today in clinic.           Disclaimer:  This note was prepared using voice recognition system and is likely to have sound alike errors that may have been overlooked even after proof reading.  Please call me with any questions.

## 2020-11-12 NOTE — PRE-PROCEDURE INSTRUCTIONS
Spoke with patient regarding procedure scheduled on 11/18    Arrival time 1000    Has patient been sick with fever or on antibiotics within the last 7 days? No    Has patient received a vaccination within the last 7 days? no    Has the patient stopped all medications as directed? Naproxen and vitamins on 11.11. Hold vitamins, supplements and other NSAIDS. Denies blood thinners.    Does patient have a pacemaker and or defibrillator? no    Does the patient have a ride to and from procedure and someone reliable to remain with patient? Coordinating transportation. Aware of policy.    Is the patient diabetic? no    Does the patient have sleep apnea? Or use O2 at home? No and no     Is the patient receiving sedation? yes    Is the patient instructed to remain NPO beginning at midnight the night before their procedure? yes    Procedure location confirmed with patient? Yes    Covid- Denies signs/symptoms. Instructed to notify PAT/MD if any changes.

## 2020-11-18 ENCOUNTER — HOSPITAL ENCOUNTER (OUTPATIENT)
Facility: HOSPITAL | Age: 40
Discharge: HOME | End: 2020-11-18
Attending: ANESTHESIOLOGY | Admitting: ANESTHESIOLOGY
Payer: OTHER GOVERNMENT

## 2020-11-18 VITALS
OXYGEN SATURATION: 99 % | SYSTOLIC BLOOD PRESSURE: 129 MMHG | HEART RATE: 78 BPM | BODY MASS INDEX: 29.44 KG/M2 | RESPIRATION RATE: 18 BRPM | TEMPERATURE: 98 F | HEIGHT: 72 IN | DIASTOLIC BLOOD PRESSURE: 86 MMHG | WEIGHT: 217.38 LBS

## 2020-11-18 DIAGNOSIS — M54.16 LUMBAR RADICULOPATHY: Primary | ICD-10-CM

## 2020-11-18 PROCEDURE — 25500020 PHARM REV CODE 255: Performed by: ANESTHESIOLOGY

## 2020-11-18 PROCEDURE — 63600175 PHARM REV CODE 636 W HCPCS: Performed by: ANESTHESIOLOGY

## 2020-11-18 PROCEDURE — 99152 MOD SED SAME PHYS/QHP 5/>YRS: CPT | Performed by: ANESTHESIOLOGY

## 2020-11-18 PROCEDURE — 64484 NJX AA&/STRD TFRM EPI L/S EA: CPT | Performed by: ANESTHESIOLOGY

## 2020-11-18 PROCEDURE — 64484 NJX AA&/STRD TFRM EPI L/S EA: CPT | Mod: LT,,, | Performed by: ANESTHESIOLOGY

## 2020-11-18 PROCEDURE — 64483 NJX AA&/STRD TFRM EPI L/S 1: CPT | Mod: LT,,, | Performed by: ANESTHESIOLOGY

## 2020-11-18 PROCEDURE — 64484 PRA INJECT ANES/STEROID FORAMEN LUMBAR/SACRAL W IMG GUIDE ,EA ADD LEVEL: ICD-10-PCS | Mod: LT,,, | Performed by: ANESTHESIOLOGY

## 2020-11-18 PROCEDURE — 25000003 PHARM REV CODE 250: Performed by: ANESTHESIOLOGY

## 2020-11-18 PROCEDURE — 64483 NJX AA&/STRD TFRM EPI L/S 1: CPT

## 2020-11-18 PROCEDURE — 64483 PR EPIDURAL INJ, ANES/STEROID, TRANSFORAMINAL, LUMB/SACR, SNGL LEVL: ICD-10-PCS | Mod: LT,,, | Performed by: ANESTHESIOLOGY

## 2020-11-18 PROCEDURE — 64483 NJX AA&/STRD TFRM EPI L/S 1: CPT | Performed by: ANESTHESIOLOGY

## 2020-11-18 RX ORDER — DEXAMETHASONE SODIUM PHOSPHATE 10 MG/ML
INJECTION INTRAMUSCULAR; INTRAVENOUS
Status: DISCONTINUED | OUTPATIENT
Start: 2020-11-18 | End: 2020-11-18 | Stop reason: HOSPADM

## 2020-11-18 RX ORDER — FENTANYL CITRATE 50 UG/ML
INJECTION, SOLUTION INTRAMUSCULAR; INTRAVENOUS
Status: DISCONTINUED | OUTPATIENT
Start: 2020-11-18 | End: 2020-11-18 | Stop reason: HOSPADM

## 2020-11-18 RX ORDER — MIDAZOLAM HYDROCHLORIDE 1 MG/ML
INJECTION, SOLUTION INTRAMUSCULAR; INTRAVENOUS
Status: DISCONTINUED | OUTPATIENT
Start: 2020-11-18 | End: 2020-11-18 | Stop reason: HOSPADM

## 2020-11-18 RX ORDER — LIDOCAINE HYDROCHLORIDE 10 MG/ML
INJECTION, SOLUTION EPIDURAL; INFILTRATION; INTRACAUDAL; PERINEURAL
Status: DISCONTINUED | OUTPATIENT
Start: 2020-11-18 | End: 2020-11-18 | Stop reason: HOSPADM

## 2020-11-18 NOTE — OP NOTE
"Procedure: Lumbar Transforaminal Epidural Steroid Injection under Fluoroscopic Guidance (supraneural approach)    Level: L4/5 L5/S1    Side: Left    PROCEDURE DATE: 11/18/2020    Pre-operative Diagnosis: Lumbar Radiculopathy  Post-operative Diagnosis: Lumbar Radiculopathy    Provider: Anjel Nugent MD  Assistant(s): None    Anesthesia: Local, IV Sedation    >> 2 mg of VERSED    >> 100 mcg of FENTANYL     Indication: Low back pain with radiculopathy consistent with distribution of targeted nerve. Symptoms unresponsive to conservative treatments. Fluoroscopy was used to optimize visualization of needle placement and to maximize safety.     Procedure Description / Technique:  The patient was seen and identified in the preoperative area. Risks, benefits, complications, and alternatives were discussed with the patient. The patient agreed to proceed with the procedure and signed the consent. The site and side of the procedure was identified and marked. An IV was started. The patient was taken to the procedural suite.    The patient was positioned in prone orientation on procedure table and a pillow was placed under the abdomen to reduce lumbar lordosis. A time out was performed prior to any intervention. The procedure, site, side, and allergies were stated and agreed to by all present. The lumbosacral area was widely prepped with ChloraPrep. The procedural site was draped in usual sterile fashion. Vital signs were closely monitored throughout this procedure. Conscious sedation was used for this procedure to decrease patient anxiety.    The target area was visualized under fluoroscopy. The cephalocaudal angle of the fluoroscope was adjusted as to align the vertebral end plates. The fluoroscopic arm was rotated ipsilaterally to an angle of approximately 30 degrees until the "pedro dog" outline came into view and the tip of the inferior superior articular process pointed towards the midline, 6:00 position of the above " "pedicle. A 22 gauge 5 inch spinal needle was directed towards the "chin" of the "pedro dog" (adjacent to the pars interarticularis and inferior to the pedicle). The needle was advanced until OS was met at the inferior border of the pedicle / pars interface. The needle was adjusted so that it would pass inferior to the osseous border. The fluoroscope was then placed in the lateral position and the needle was slowly advanced until it rested in the posterior 1/3rd of the vertebral foramen. AP fluoroscopy was checked and the needle tip rested at the 6:00 position under the pedicle. No paresthesia was elicited during needle placement. With the needle tip in its final position, gentle aspiration was negative for blood and CSF. Omnipaque 240 (1 to 2 mL) was injected under live fluoroscopy. Microbore tubing was used for injection. There was no pain or paresthesia on injection. The contrast clearly delineated the targeted nerve root on AP fluoroscopy. No vascular uptake was seen. A solution containing 2 mL of 1% PF Lidocaine and 2 mL of Dexamethasone (10 mg/mL) was mixed and 2 mL was injected slowly at each level targeted. There was minimal resistance on injection. No pain or paresthesia was elicited on injection. The stylet was replaced and the needle was withdrawn intact. This procedure was performed for each of the above indicated levels.     Description of Findings: Not applicable    Prosthetic devices, grafts, tissues, or devices implanted: None    Specimen Removed: No    Estimated Blood Loss: minimal    COMPLICATIONS: None    DISPOSITION / PLANS: The patient was transferred to the recovery area in a stable condition for observation. The patient was reexamined prior to discharge. There was no evidence of acute neurologic injury following the procedure.  Patient was discharged from the recovery room after meeting discharge criteria. Home discharge instructions were given to the patient by the staff.  "

## 2020-11-18 NOTE — DISCHARGE INSTRUCTIONS

## 2020-11-18 NOTE — DISCHARGE SUMMARY
[facility]  Discharge Note       Description of Procedure: Left L4-5, L5-S1 Lumbar Transforaminal Epidural Steroid Injection under Fluoroscopic Guidance    Procedure Date: 11/18/2020    Admit Date: 11/18/2020  Discharge Date: 11/18/2020     Attending Physician: Anjel Nugent   Discharge Provider: Anjel Nugent    Preoperative Diagnosis: Lumbar Radiculopathy    Postoperative Diagnosis: as above, same as preoperative diagnosis    Discharged Condition: Stable    Hospital Course: Patient was admitted for an outpatient procedure. The procedure was tolerated well with no complications.    Final Diagnoses: Same as principal problem.    Disposition: Home, self-care.    Follow up/Patient Instructions:  Follow-up in clinic in 2-3 weeks.    Medications: No medications were prescribed today. The patient was advised to resume normal medication regimen without change.  Specific information was provided regarding restarting any anticoagulant/s.    Discharge Procedure Orders (must include Diet, Follow-up, Activity):  Light activity for the remainder of the day, resume normal activity tomorrow. Resume normal diet. Follow-up in clinic in 2-3 weeks.

## 2020-12-04 ENCOUNTER — HOSPITAL ENCOUNTER (OUTPATIENT)
Dept: RADIOLOGY | Facility: HOSPITAL | Age: 40
Discharge: HOME | End: 2020-12-04
Attending: PHYSICIAN ASSISTANT
Payer: OTHER GOVERNMENT

## 2020-12-04 ENCOUNTER — OFFICE VISIT (OUTPATIENT)
Dept: INTERNAL MEDICINE | Facility: CLINIC | Age: 40
End: 2020-12-04
Payer: OTHER GOVERNMENT

## 2020-12-04 ENCOUNTER — TELEPHONE (OUTPATIENT)
Dept: INTERNAL MEDICINE | Facility: CLINIC | Age: 40
End: 2020-12-04

## 2020-12-04 VITALS
HEIGHT: 72 IN | HEART RATE: 70 BPM | SYSTOLIC BLOOD PRESSURE: 120 MMHG | BODY MASS INDEX: 30.28 KG/M2 | DIASTOLIC BLOOD PRESSURE: 74 MMHG | WEIGHT: 223.56 LBS | TEMPERATURE: 98 F

## 2020-12-04 DIAGNOSIS — M25.522 BILATERAL ELBOW JOINT PAIN: ICD-10-CM

## 2020-12-04 DIAGNOSIS — M25.521 BILATERAL ELBOW JOINT PAIN: Primary | ICD-10-CM

## 2020-12-04 DIAGNOSIS — M25.522 BILATERAL ELBOW JOINT PAIN: Primary | ICD-10-CM

## 2020-12-04 DIAGNOSIS — M25.521 BILATERAL ELBOW JOINT PAIN: ICD-10-CM

## 2020-12-04 PROCEDURE — 99213 OFFICE O/P EST LOW 20 MIN: CPT | Mod: PBBFAC,25,PO | Performed by: PHYSICIAN ASSISTANT

## 2020-12-04 PROCEDURE — 99999 PR PBB SHADOW E&M-EST. PATIENT-LVL III: ICD-10-PCS | Mod: PBBFAC,,, | Performed by: PHYSICIAN ASSISTANT

## 2020-12-04 PROCEDURE — 73070 X-RAY EXAM OF ELBOW: CPT | Mod: 26,50,, | Performed by: RADIOLOGY

## 2020-12-04 PROCEDURE — 73070 XR ELBOW 2 VIEW BILATERAL: ICD-10-PCS | Mod: 26,50,, | Performed by: RADIOLOGY

## 2020-12-04 PROCEDURE — 99999 PR PBB SHADOW E&M-EST. PATIENT-LVL III: CPT | Mod: PBBFAC,,, | Performed by: PHYSICIAN ASSISTANT

## 2020-12-04 PROCEDURE — 99213 PR OFFICE/OUTPT VISIT, EST, LEVL III, 20-29 MIN: ICD-10-PCS | Mod: S$PBB,,, | Performed by: PHYSICIAN ASSISTANT

## 2020-12-04 PROCEDURE — 73070 X-RAY EXAM OF ELBOW: CPT | Mod: TC,50,FY,PO

## 2020-12-04 PROCEDURE — 99213 OFFICE O/P EST LOW 20 MIN: CPT | Mod: S$PBB,,, | Performed by: PHYSICIAN ASSISTANT

## 2020-12-04 RX ORDER — INFLUENZA A VIRUS A/VICTORIA/2454/2019 IVR-207 (H1N1) ANTIGEN (PROPIOLACTONE INACTIVATED), INFLUENZA A VIRUS A/HONG KONG/2671/2019 IVR-208 (H3N2) ANTIGEN (PROPIOLACTONE INACTIVATED), INFLUENZA B VIRUS B/VICTORIA/705/2018 BVR-11 ANTIGEN (PROPIOLACTONE INACTIVATED), INFLUENZA B VIRUS B/PHUKET/3073/2013 BVR-1B ANTIGEN (PROPIOLACTONE INACTIVATED) 15; 15; 15; 15 UG/.5ML; UG/.5ML; UG/.5ML; UG/.5ML
INJECTION, SUSPENSION INTRAMUSCULAR
COMMUNITY
Start: 2020-09-23 | End: 2022-01-05 | Stop reason: ALTCHOICE

## 2020-12-04 RX ORDER — MELOXICAM 7.5 MG/1
7.5 TABLET ORAL DAILY
Qty: 15 TABLET | Refills: 0 | Status: SHIPPED | OUTPATIENT
Start: 2020-12-04 | End: 2020-12-18

## 2020-12-04 NOTE — TELEPHONE ENCOUNTER
----- Message from Stephanie Quispe sent at 12/4/2020  4:23 PM CST -----  Regarding: returning call  Contact: [phone]  Type:  Patient Returning Call    Who Called:pt  Who Left Message for Patient: bandar  Does the patient know what this is regarding?:  Would the patient rather a call back or a response via [facility]? Call back   Best Call Back Number:[phone]  Additional Information:

## 2020-12-07 ENCOUNTER — TELEPHONE (OUTPATIENT)
Dept: INTERNAL MEDICINE | Facility: CLINIC | Age: 40
End: 2020-12-07

## 2020-12-07 ENCOUNTER — TELEPHONE (OUTPATIENT)
Dept: ORTHOPEDICS | Facility: CLINIC | Age: 40
End: 2020-12-07

## 2020-12-07 DIAGNOSIS — M25.521 BILATERAL ELBOW JOINT PAIN: Primary | ICD-10-CM

## 2020-12-07 DIAGNOSIS — M25.522 BILATERAL ELBOW JOINT PAIN: Primary | ICD-10-CM

## 2020-12-07 NOTE — TELEPHONE ENCOUNTER
----- Message from Dania Cruz MA sent at 12/4/2020  4:45 PM CST -----  Informed pt with results and recommendations. Pt verbalized understanding.  (please place ortho referral)

## 2020-12-08 ENCOUNTER — OFFICE VISIT (OUTPATIENT)
Dept: ORTHOPEDICS | Facility: CLINIC | Age: 40
End: 2020-12-08
Payer: OTHER GOVERNMENT

## 2020-12-08 VITALS
BODY MASS INDEX: 29.53 KG/M2 | HEIGHT: 72 IN | WEIGHT: 218 LBS | SYSTOLIC BLOOD PRESSURE: 126 MMHG | HEART RATE: 83 BPM | DIASTOLIC BLOOD PRESSURE: 77 MMHG

## 2020-12-08 DIAGNOSIS — M77.12 LEFT LATERAL EPICONDYLITIS: ICD-10-CM

## 2020-12-08 DIAGNOSIS — M77.8 TRICEPS TENDINITIS: Primary | ICD-10-CM

## 2020-12-08 DIAGNOSIS — M25.522 BILATERAL ELBOW JOINT PAIN: Primary | ICD-10-CM

## 2020-12-08 DIAGNOSIS — M25.521 BILATERAL ELBOW JOINT PAIN: ICD-10-CM

## 2020-12-08 DIAGNOSIS — M25.521 BILATERAL ELBOW JOINT PAIN: Primary | ICD-10-CM

## 2020-12-08 DIAGNOSIS — M54.16 LUMBAR RADICULOPATHY: ICD-10-CM

## 2020-12-08 DIAGNOSIS — M25.522 BILATERAL ELBOW JOINT PAIN: ICD-10-CM

## 2020-12-08 PROCEDURE — 99213 OFFICE O/P EST LOW 20 MIN: CPT | Mod: PBBFAC | Performed by: FAMILY MEDICINE

## 2020-12-08 PROCEDURE — 99214 PR OFFICE/OUTPT VISIT, EST, LEVL IV, 30-39 MIN: ICD-10-PCS | Mod: S$PBB,,, | Performed by: FAMILY MEDICINE

## 2020-12-08 PROCEDURE — 99999 PR PBB SHADOW E&M-EST. PATIENT-LVL III: CPT | Mod: PBBFAC,,, | Performed by: FAMILY MEDICINE

## 2020-12-08 PROCEDURE — 99999 PR PBB SHADOW E&M-EST. PATIENT-LVL III: ICD-10-PCS | Mod: PBBFAC,,, | Performed by: FAMILY MEDICINE

## 2020-12-08 PROCEDURE — 99214 OFFICE O/P EST MOD 30 MIN: CPT | Mod: S$PBB,,, | Performed by: FAMILY MEDICINE

## 2020-12-08 RX ORDER — MELOXICAM 15 MG/1
15 TABLET ORAL DAILY
Qty: 30 TABLET | Refills: 2 | Status: SHIPPED | OUTPATIENT
Start: 2020-12-08 | End: 2020-12-18

## 2020-12-08 NOTE — PATIENT INSTRUCTIONS
Apply ice to affected area three times a day for (15) fifteen minutes for the next 48 hours, and reduce activity during that time.  Voltaren gel three times a day to affected area if recommended.  Oral medication if recommended.  Physical therapy if recommended at home or at clinic.  Keep recheck visit.      Physical therapy for both elbows for lateral epicondylitis-tennis elbow-triceps tendinitis.    Try forearm strap on the left.    Recheck in 2-4 weeks and consider cortisone injection for left elbow if needed.    Avoid aggravating motions in activities until pain is subsiding.

## 2020-12-08 NOTE — LETTER
December 8, 2020      SARAY Garza  [address] [facility]  [place] [address]           [facility]  [address] [facility]  [place] [address]  Phone: [phone]  Fax: [phone]          Patient: Matthew Graham Blakemore   MR Number: [id number]   YOB: 1980   Date of Visit: 12/8/2020       Dear Darleen Perez:    Thank you for referring Matthew Blakemore to me for evaluation. Attached you will find relevant portions of my assessment and plan of care.    If you have questions, please do not hesitate to call me. I look forward to following Matthew Blakemore along with you.    Sincerely,    Mendel Lucio MD    Enclosure  CC:  No Recipients    If you would like to receive this communication electronically, please contact [email or web address] or [phone] to request more information on [employer] access.    For providers and/or their staff who would like to refer a patient to [facility], please contact us through our one-stop-shop provider referral line, [facility], at [phone].    If you feel you have received this communication in error or would no longer like to receive these types of communications, please e-mail [email or web address]

## 2020-12-08 NOTE — PROGRESS NOTES
Subjective:     Patient ID: Matthew Graham Blakemore is a 40 y.o. male.    Chief Complaint: Pain of the Left Elbow and Pain of the Right Elbow      HPI:  Patient is in the  and is active physically working out regularly [place]  He did not have an inciting injury that he knows of but he has had bilateral elbow pain worsening for the last 3-4  months.  He was recently put on meloxicam and finds that this is very helpful as far as an anti-inflammatory pain medication.    States do not see any type of movement aggravates the pain in his elbows although lifting overhand with the left elbow is very painful.  Also doing various types of exercises causes him pain in the elbows particularly things that require pushing triceps type strength.  He has not noticed redness or swelling but states that bending elbows Ortho twisting the forearm sometimes aggravates the pain.    Past Medical History:   Diagnosis Date    Tinnitus      Past Surgical History:   Procedure Laterality Date    HERNIA REPAIR      SELECTIVE INJECTION OF ANESTHETIC AGENT AROUND LUMBAR SPINAL NERVE ROOT BY TRANSFORAMINAL APPROACH Left 3/4/2020    Procedure: BLOCK, SPINAL NERVE ROOT, LUMBAR, SELECTIVE, TRANSFORAMINAL APPROACH Left L4-5, L5-S1 RN IV sedation;  Surgeon: Anjel Nugent MD;  Location: [facility]IN [facility];  Service: Pain Management;  Laterality: Left;    TRANSFORAMINAL EPIDURAL INJECTION OF STEROID Left 11/18/2020    Procedure: left L4/5 + L5/S1  TF ART;  Surgeon: Anjel Nugent MD;  Location: [facility];  Service: Pain Management;  Laterality: Left;    ULTRASOUND GUIDANCE Left 1/31/2019    Procedure: ULTRASOUND GUIDANCE;  Surgeon: Travis Flowers IV, MD;  Location: [facility] OR;  Service: Urology;  Laterality: Left;  Doppler ultrasound of left testicular artery    VASECTOMY Bilateral 1/31/2019    Procedure: VASECTOMY;  Surgeon: Travis Flowers IV, MD;  Location: [facility] OR;  Service: Urology;  Laterality:  Bilateral;   Vasectomy, bilateral with excision and scrotal exploration on left side; difficult     Family History   Problem Relation Age of Onset    Heart attacks under age 50 Maternal Grandfather     Melanoma Mother     Melanoma Father     Melanoma Sister     Melanoma Brother      Social History     Socioeconomic History    Marital status:      Spouse name: Not on file    Number of children: Not on file    Years of education: Not on file    Highest education level: Not on file   Occupational History    Not on file   Social Needs    Financial resource strain: Not very hard    Food insecurity     Worry: Never true     Inability: Never true    Transportation needs     Medical: No     Non-medical: No   Tobacco Use    Smoking status: Current Every Day Smoker     Packs/day: 0.25     Years: 27.00     Pack years: 6.75     Types: Vaping w/o nicotine, Cigarettes     Start date: 1990    Smokeless tobacco: Never Used   Substance and Sexual Activity    Alcohol use: No     Frequency: Monthly or less     Drinks per session: 1 or 2     Binge frequency: Never    Drug use: No    Sexual activity: Not Currently     Partners: Female   Lifestyle    Physical activity     Days per week: 4 days     Minutes per session: 30 min    Stress: Not at all   Relationships    Social connections     Talks on phone: More than three times a week     Gets together: Twice a week     Attends Holiness service: Not on file     Active member of club or organization: Yes     Attends meetings of clubs or organizations: 1 to 4 times per year     Relationship status:    Other Topics Concern    Not on file   Social History Narrative    Live w/ spouse and  5 minor children        Current Outpatient Medications:     acetaminophen (TYLENOL) 325 MG tablet, Take 500 mg by mouth every 6 (six) hours as needed for Pain., Disp: , Rfl:     AFLURIA QD 2020-21,3YR UP,,PF, 60 mcg (15 mcg x 4)/0.5 mL Syrg, ADM 0.5ML IM UTD, Disp: , Rfl:      meloxicam (MOBIC) 7.5 MG tablet, Take 1 tablet (7.5 mg total) by mouth once daily. for 15 days, Disp: 15 tablet, Rfl: 0    pramipexole (MIRAPEX) 0.25 MG tablet, Take 1 tablet (0.25 mg total) by mouth every evening., Disp: 90 tablet, Rfl: 4    diazePAM (VALIUM) 5 MG tablet, Take 1 tablet (5 mg total) by mouth every 6 (six) hours as needed (Vertigo)., Disp: 3 tablet, Rfl: 0    meloxicam (MOBIC) 15 MG tablet, Take 1 tablet (15 mg total) by mouth once daily., Disp: 30 tablet, Rfl: 2  Review of patient's allergies indicates:   Allergen Reactions    Penicillins Other (See Comments)     unknown     Review of Systems   Constitutional: Negative for chills, fever and weight loss.   Respiratory: Negative for shortness of breath.    Cardiovascular: Negative for chest pain and palpitations.       Objective:   Body mass index is 29.57 kg/m².  Vitals:    12/08/20 1319   BP: 126/77   Pulse: 83           Ortho/SPM Exam -alert and oriented well-nourished well-developed fit appearing adult male ambulatory in no acute distress    Respiratory effort normal    Right-full range of motion and no swelling    Mild tenderness at the lateral epicondyle  No increased pain with resisted dorsiflexion  Negative Tinel and Phalen test    Mild tenderness at the likely non    Left elbow-definite point tenderness over the lateral epicondyle and increased pain with resisted dorsiflexion    Mild tenderness over left olecranon    Negative Phalen and negative Tinel     strength is good bilaterally    Biceps and triceps strength a good but some pain involved with resistance    Neurovascular intact    Psychiatric good affect cognition  IMAGING: X-Ray Elbow 2 View Bilateral  Narrative: EXAMINATION:  XR ELBOW 2 VIEW BILATERAL    CLINICAL HISTORY:  Pain in right elbow    TECHNIQUE:  Three view bilateral elbows    COMPARISON:  None    FINDINGS:  No acute osseous abnormality.  No large joint effusion.  Symmetric bilateral enthesophyte formation at  the triceps olecranon insertion.  Minimal bilateral enthesophyte change at the bilateral common extensor tendon origins.  Soft tissues unremarkable.  Impression: As above    Electronically signed by: Flip Gamble MD  Date:    12/04/2020  Time:    14:40       Radiographs / Imaging : Relevant imaging results reviewed by me and interpreted by me, discussed with the patient and / or family -agree with report and discussed in viewed with patient regarding bone spurs.    Discussion-physical therapy should be for very helpful for him for the triceps tendinitis and also epicondylitis.  He understands he made a cortisone injection for the left lateral epicondylitis on his recheck visit.  Also application of ice and use and lock scan should be helpful as well as applying Voltaren gel.      Assessment:     Encounter Diagnoses   Name Primary?    Bilateral elbow joint pain     Triceps tendinitis Yes    Left lateral epicondylitis         Plan:   Triceps tendinitis    Bilateral elbow joint pain  -     Ambulatory referral/consult to Orthopedics    Left lateral epicondylitis    Other orders  -     meloxicam (MOBIC) 15 MG tablet; Take 1 tablet (15 mg total) by mouth once daily.  Dispense: 30 tablet; Refill: 2        The patient verbalized good understanding of the medical issues discussed today and expressed appreciation for the care provided.  Patient was given the opportunity to ask questions and be an active participant in their medical care. Patient had no further questions or concerns at this time.     The patient was encouraged to participate in appropriate physical activity.      Mendel Lucio M.D.  [facility]        This note was dictated using voice recognition software and may have sound a like errors.

## 2020-12-08 NOTE — PROGRESS NOTES
Subjective:       Patient ID: Matthew Graham Blakemore is a 40 y.o. male.    Chief Complaint: Elbow Injury    HPI  Patient comes in today for bilateral elbow pain   Started a few months ago   Both elbows affected   Feels sore at times     Worse when lifting things   No swelling or redness       Health Maintenance Due   Topic Date Due    Hepatitis C Screening  1980    HIV Screening  08/17/1994    TETANUS VACCINE  08/17/1997    Pneumococcal Vaccine (Medium Risk) (1 of 1 - PPSV23) 08/17/1998       Past Medical History:   Diagnosis Date    Tinnitus        Current Outpatient Medications   Medication Sig Dispense Refill    acetaminophen (TYLENOL) 325 MG tablet Take 500 mg by mouth every 6 (six) hours as needed for Pain.      diazePAM (VALIUM) 5 MG tablet Take 1 tablet (5 mg total) by mouth every 6 (six) hours as needed (Vertigo). 3 tablet 0    pramipexole (MIRAPEX) 0.25 MG tablet Take 1 tablet (0.25 mg total) by mouth every evening. 90 tablet 4    AFLURIA QD 2020-21,3YR UP,,PF, 60 mcg (15 mcg x 4)/0.5 mL Syrg ADM 0.5ML IM UTD      meloxicam (MOBIC) 7.5 MG tablet Take 1 tablet (7.5 mg total) by mouth once daily. for 15 days 15 tablet 0     No current facility-administered medications for this visit.        Review of Systems   Constitutional: Negative for fatigue, fever and unexpected weight change.   HENT: Negative for sore throat and trouble swallowing.    Respiratory: Negative for cough and shortness of breath.    Cardiovascular: Negative for chest pain.   Gastrointestinal: Negative for abdominal pain.   Hematological: Negative for adenopathy. Does not bruise/bleed easily.   All other systems reviewed and are negative.      Objective:   /74   Pulse 70   Temp 98.1 °F (36.7 °C) (Temporal)   Ht 6' (1.829 m)   Wt 101.4 kg (223 lb 8.7 oz)   BMI 30.32 kg/m²      Physical Exam  Constitutional:       General: He is not in acute distress.     Appearance: Normal appearance. He is well-developed and  normal weight.   HENT:      Head: Normocephalic and atraumatic.   Eyes:      Pupils: Pupils are equal, round, and reactive to light.   Neck:      Musculoskeletal: Normal range of motion and neck supple.   Abdominal:      Palpations: Abdomen is soft.   Musculoskeletal:      Right elbow: Normal.     Left elbow: Normal.      Comments: Pain when using elbows in passive strength exercises    Skin:     Capillary Refill: Capillary refill takes less than 2 seconds.   Neurological:      Mental Status: He is alert and oriented to person, place, and time.   Psychiatric:         Behavior: Behavior normal.           Lab Results   Component Value Date    WBC 6.42 01/14/2020    HGB 15.5 01/14/2020    HCT 47.5 01/14/2020     01/14/2020    CHOL 154 06/12/2018    TRIG 102 06/12/2018    HDL 28 (L) 06/12/2018     01/14/2020    K 4.0 01/14/2020     01/14/2020    CREATININE 1.0 01/14/2020    BUN 13 01/14/2020    CO2 27 01/14/2020       Assessment:       1. Bilateral elbow joint pain        Plan:   Bilateral elbow joint pain  -     Cancel: X-Ray Elbow Complete Bilateral; Future; Expected date: 12/04/2020    Other orders  -     meloxicam (MOBIC) 7.5 MG tablet; Take 1 tablet (7.5 mg total) by mouth once daily. for 15 days  Dispense: 15 tablet; Refill: 0      HPI supports arthritic issue   Start mobic   Ortho follow up if needed

## 2021-01-20 ENCOUNTER — PATIENT OUTREACH (OUTPATIENT)
Dept: ADMINISTRATIVE | Facility: OTHER | Age: 41
End: 2021-01-20

## 2021-01-21 ENCOUNTER — OFFICE VISIT (OUTPATIENT)
Dept: ORTHOPEDICS | Facility: CLINIC | Age: 41
End: 2021-01-21
Payer: OTHER GOVERNMENT

## 2021-01-21 VITALS
SYSTOLIC BLOOD PRESSURE: 131 MMHG | BODY MASS INDEX: 29.54 KG/M2 | DIASTOLIC BLOOD PRESSURE: 82 MMHG | HEIGHT: 72 IN | HEART RATE: 82 BPM | WEIGHT: 218.06 LBS

## 2021-01-21 DIAGNOSIS — M77.12 LEFT LATERAL EPICONDYLITIS: Primary | ICD-10-CM

## 2021-01-21 PROCEDURE — 99214 OFFICE O/P EST MOD 30 MIN: CPT | Mod: S$PBB,25,, | Performed by: FAMILY MEDICINE

## 2021-01-21 PROCEDURE — 20605 INTERMEDIATE JOINT ASPIRATION/INJECTION: L ELBOW: ICD-10-PCS | Mod: S$PBB,LT,, | Performed by: FAMILY MEDICINE

## 2021-01-21 PROCEDURE — 99999 PR PBB SHADOW E&M-EST. PATIENT-LVL III: CPT | Mod: PBBFAC,,, | Performed by: FAMILY MEDICINE

## 2021-01-21 PROCEDURE — 20605 DRAIN/INJ JOINT/BURSA W/O US: CPT | Mod: PBBFAC | Performed by: FAMILY MEDICINE

## 2021-01-21 PROCEDURE — 99213 OFFICE O/P EST LOW 20 MIN: CPT | Mod: PBBFAC,25 | Performed by: FAMILY MEDICINE

## 2021-01-21 PROCEDURE — 99999 PR PBB SHADOW E&M-EST. PATIENT-LVL III: ICD-10-PCS | Mod: PBBFAC,,, | Performed by: FAMILY MEDICINE

## 2021-01-21 PROCEDURE — 99214 PR OFFICE/OUTPT VISIT, EST, LEVL IV, 30-39 MIN: ICD-10-PCS | Mod: S$PBB,25,, | Performed by: FAMILY MEDICINE

## 2021-01-21 RX ORDER — BETAMETHASONE SODIUM PHOSPHATE AND BETAMETHASONE ACETATE 3; 3 MG/ML; MG/ML
6 INJECTION, SUSPENSION INTRA-ARTICULAR; INTRALESIONAL; INTRAMUSCULAR; SOFT TISSUE
Status: DISCONTINUED | OUTPATIENT
Start: 2021-01-21 | End: 2021-01-21 | Stop reason: HOSPADM

## 2021-01-21 RX ADMIN — BETAMETHASONE ACETATE AND BETAMETHASONE SODIUM PHOSPHATE 6 MG: 3; 3 INJECTION, SUSPENSION INTRA-ARTICULAR; INTRALESIONAL; INTRAMUSCULAR; SOFT TISSUE at 11:01

## 2021-03-01 ENCOUNTER — PATIENT MESSAGE (OUTPATIENT)
Dept: ORTHOPEDICS | Facility: CLINIC | Age: 41
End: 2021-03-01

## 2021-03-15 ENCOUNTER — OFFICE VISIT (OUTPATIENT)
Dept: ORTHOPEDICS | Facility: CLINIC | Age: 41
End: 2021-03-15
Payer: OTHER GOVERNMENT

## 2021-03-15 VITALS
WEIGHT: 218.06 LBS | HEIGHT: 72 IN | BODY MASS INDEX: 29.54 KG/M2 | HEART RATE: 87 BPM | SYSTOLIC BLOOD PRESSURE: 114 MMHG | DIASTOLIC BLOOD PRESSURE: 66 MMHG

## 2021-03-15 DIAGNOSIS — M77.11 RIGHT LATERAL EPICONDYLITIS: Primary | ICD-10-CM

## 2021-03-15 DIAGNOSIS — M77.12 LEFT LATERAL EPICONDYLITIS: ICD-10-CM

## 2021-03-15 PROCEDURE — 99214 OFFICE O/P EST MOD 30 MIN: CPT | Mod: S$PBB,25,, | Performed by: FAMILY MEDICINE

## 2021-03-15 PROCEDURE — 99999 PR PBB SHADOW E&M-EST. PATIENT-LVL III: CPT | Mod: PBBFAC,,, | Performed by: FAMILY MEDICINE

## 2021-03-15 PROCEDURE — 99213 OFFICE O/P EST LOW 20 MIN: CPT | Mod: PBBFAC | Performed by: FAMILY MEDICINE

## 2021-03-15 PROCEDURE — 99214 PR OFFICE/OUTPT VISIT, EST, LEVL IV, 30-39 MIN: ICD-10-PCS | Mod: S$PBB,25,, | Performed by: FAMILY MEDICINE

## 2021-03-15 PROCEDURE — 20605 INTERMEDIATE JOINT ASPIRATION/INJECTION: R ELBOW: ICD-10-PCS | Mod: S$PBB,RT,, | Performed by: FAMILY MEDICINE

## 2021-03-15 PROCEDURE — 20605 DRAIN/INJ JOINT/BURSA W/O US: CPT | Mod: PBBFAC | Performed by: FAMILY MEDICINE

## 2021-03-15 PROCEDURE — 99999 PR PBB SHADOW E&M-EST. PATIENT-LVL III: ICD-10-PCS | Mod: PBBFAC,,, | Performed by: FAMILY MEDICINE

## 2021-03-15 RX ORDER — MELOXICAM 15 MG/1
15 TABLET ORAL DAILY
Qty: 30 TABLET | Refills: 3 | Status: SHIPPED | OUTPATIENT
Start: 2021-03-15 | End: 2021-07-12

## 2021-03-15 RX ORDER — BETAMETHASONE SODIUM PHOSPHATE AND BETAMETHASONE ACETATE 3; 3 MG/ML; MG/ML
6 INJECTION, SUSPENSION INTRA-ARTICULAR; INTRALESIONAL; INTRAMUSCULAR; SOFT TISSUE
Status: DISCONTINUED | OUTPATIENT
Start: 2021-03-15 | End: 2021-03-15 | Stop reason: HOSPADM

## 2021-03-15 RX ADMIN — BETAMETHASONE ACETATE AND BETAMETHASONE SODIUM PHOSPHATE 6 MG: 3; 3 INJECTION, SUSPENSION INTRA-ARTICULAR; INTRALESIONAL; INTRAMUSCULAR; SOFT TISSUE at 01:03

## 2021-06-08 ENCOUNTER — PATIENT MESSAGE (OUTPATIENT)
Dept: INTERNAL MEDICINE | Facility: CLINIC | Age: 41
End: 2021-06-08

## 2021-06-10 ENCOUNTER — TELEPHONE (OUTPATIENT)
Dept: UROLOGY | Facility: CLINIC | Age: 41
End: 2021-06-10

## 2021-06-18 ENCOUNTER — OFFICE VISIT (OUTPATIENT)
Dept: UROLOGY | Facility: CLINIC | Age: 41
End: 2021-06-18
Payer: OTHER GOVERNMENT

## 2021-06-18 DIAGNOSIS — N52.9 ERECTILE DYSFUNCTION, UNSPECIFIED ERECTILE DYSFUNCTION TYPE: Primary | ICD-10-CM

## 2021-06-18 DIAGNOSIS — R35.0 URINARY FREQUENCY: ICD-10-CM

## 2021-06-18 PROCEDURE — 99214 OFFICE O/P EST MOD 30 MIN: CPT | Mod: 95,,, | Performed by: UROLOGY

## 2021-06-18 PROCEDURE — 99214 PR OFFICE/OUTPT VISIT, EST, LEVL IV, 30-39 MIN: ICD-10-PCS | Mod: 95,,, | Performed by: UROLOGY

## 2021-06-18 RX ORDER — TADALAFIL 5 MG/1
5 TABLET ORAL DAILY
Qty: 30 TABLET | Refills: 11 | Status: SHIPPED | OUTPATIENT
Start: 2021-06-18 | End: 2021-06-21

## 2021-06-21 ENCOUNTER — PATIENT MESSAGE (OUTPATIENT)
Dept: UROLOGY | Facility: CLINIC | Age: 41
End: 2021-06-21

## 2021-06-21 RX ORDER — TADALAFIL 20 MG/1
20 TABLET ORAL DAILY PRN
Qty: 10 TABLET | Refills: 11 | Status: SHIPPED | OUTPATIENT
Start: 2021-06-21 | End: 2021-07-07

## 2021-06-22 ENCOUNTER — LAB VISIT (OUTPATIENT)
Dept: LAB | Facility: HOSPITAL | Age: 41
End: 2021-06-22
Attending: UROLOGY
Payer: OTHER GOVERNMENT

## 2021-06-22 DIAGNOSIS — R35.0 URINARY FREQUENCY: ICD-10-CM

## 2021-06-22 DIAGNOSIS — N52.9 ERECTILE DYSFUNCTION, UNSPECIFIED ERECTILE DYSFUNCTION TYPE: ICD-10-CM

## 2021-06-22 LAB — COMPLEXED PSA SERPL-MCNC: 0.55 NG/ML (ref 0–4)

## 2021-06-22 PROCEDURE — 84403 ASSAY OF TOTAL TESTOSTERONE: CPT | Performed by: UROLOGY

## 2021-06-22 PROCEDURE — 84153 ASSAY OF PSA TOTAL: CPT | Performed by: UROLOGY

## 2021-06-29 LAB
ALBUMIN SERPL-MCNC: 4.5 G/DL (ref 3.6–5.1)
SHBG SERPL-SCNC: 28 NMOL/L (ref 10–50)
TESTOST FREE SERPL-MCNC: 92.1 PG/ML (ref 46–224)
TESTOST SERPL-MCNC: 580 NG/DL (ref 250–1100)
TESTOSTERONE.FREE+WB SERPL-MCNC: 189.3 NG/DL (ref 110–575)

## 2021-07-05 ENCOUNTER — PATIENT MESSAGE (OUTPATIENT)
Dept: UROLOGY | Facility: CLINIC | Age: 41
End: 2021-07-05

## 2021-07-07 ENCOUNTER — TELEPHONE (OUTPATIENT)
Dept: UROLOGY | Facility: CLINIC | Age: 41
End: 2021-07-07

## 2021-07-07 RX ORDER — SILDENAFIL 100 MG/1
100 TABLET, FILM COATED ORAL DAILY PRN
Qty: 30 TABLET | Refills: 5 | Status: SHIPPED | OUTPATIENT
Start: 2021-07-07 | End: 2022-01-18

## 2022-01-05 ENCOUNTER — OFFICE VISIT (OUTPATIENT)
Dept: INTERNAL MEDICINE | Facility: CLINIC | Age: 42
End: 2022-01-05
Payer: OTHER GOVERNMENT

## 2022-01-05 VITALS
TEMPERATURE: 98 F | BODY MASS INDEX: 30.37 KG/M2 | DIASTOLIC BLOOD PRESSURE: 84 MMHG | HEIGHT: 72 IN | SYSTOLIC BLOOD PRESSURE: 126 MMHG | WEIGHT: 224.19 LBS | HEART RATE: 68 BPM

## 2022-01-05 DIAGNOSIS — N63.10 BREAST MASS, RIGHT: Primary | ICD-10-CM

## 2022-01-05 DIAGNOSIS — Z80.3 FAMILY HISTORY OF BREAST CANCER IN MALE: ICD-10-CM

## 2022-01-05 PROCEDURE — 99213 PR OFFICE/OUTPT VISIT, EST, LEVL III, 20-29 MIN: ICD-10-PCS | Mod: S$PBB,,, | Performed by: NURSE PRACTITIONER

## 2022-01-05 PROCEDURE — 99999 PR PBB SHADOW E&M-EST. PATIENT-LVL III: CPT | Mod: PBBFAC,,, | Performed by: NURSE PRACTITIONER

## 2022-01-05 PROCEDURE — 99213 OFFICE O/P EST LOW 20 MIN: CPT | Mod: S$PBB,,, | Performed by: NURSE PRACTITIONER

## 2022-01-05 PROCEDURE — 99999 PR PBB SHADOW E&M-EST. PATIENT-LVL III: ICD-10-PCS | Mod: PBBFAC,,, | Performed by: NURSE PRACTITIONER

## 2022-01-05 PROCEDURE — 99213 OFFICE O/P EST LOW 20 MIN: CPT | Mod: PBBFAC,PO | Performed by: NURSE PRACTITIONER
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
## 2022-01-13 ENCOUNTER — HOSPITAL ENCOUNTER (OUTPATIENT)
Dept: RADIOLOGY | Facility: HOSPITAL | Age: 42
Discharge: HOME | End: 2022-01-13
Attending: NURSE PRACTITIONER
Payer: OTHER GOVERNMENT

## 2022-01-13 VITALS — HEIGHT: 72 IN | WEIGHT: 224.19 LBS | BODY MASS INDEX: 30.37 KG/M2

## 2022-01-13 DIAGNOSIS — N63.10 BREAST MASS, RIGHT: ICD-10-CM

## 2022-01-13 DIAGNOSIS — Z80.3 FAMILY HISTORY OF BREAST CANCER IN MALE: ICD-10-CM

## 2022-01-13 PROCEDURE — 77062 MAMMO DIGITAL DIAGNOSTIC BILAT WITH TOMO: ICD-10-PCS | Mod: 26,,, | Performed by: RADIOLOGY

## 2022-01-13 PROCEDURE — 77062 BREAST TOMOSYNTHESIS BI: CPT | Mod: TC

## 2022-01-13 PROCEDURE — 77066 DX MAMMO INCL CAD BI: CPT | Mod: 26,,, | Performed by: RADIOLOGY

## 2022-01-13 PROCEDURE — 77062 BREAST TOMOSYNTHESIS BI: CPT | Mod: 26,,, | Performed by: RADIOLOGY

## 2022-01-13 PROCEDURE — 76642 ULTRASOUND BREAST LIMITED: CPT | Mod: TC,RT

## 2022-01-13 PROCEDURE — 76642 US BREAST RIGHT LIMITED: ICD-10-PCS | Mod: 26,RT,, | Performed by: RADIOLOGY

## 2022-01-13 PROCEDURE — 76642 ULTRASOUND BREAST LIMITED: CPT | Mod: 26,RT,, | Performed by: RADIOLOGY

## 2022-01-13 PROCEDURE — 77066 MAMMO DIGITAL DIAGNOSTIC BILAT WITH TOMO: ICD-10-PCS | Mod: 26,,, | Performed by: RADIOLOGY

## 2022-01-14 ENCOUNTER — PATIENT MESSAGE (OUTPATIENT)
Dept: INTERNAL MEDICINE | Facility: CLINIC | Age: 42
End: 2022-01-14
Payer: OTHER GOVERNMENT

## 2022-01-14 DIAGNOSIS — N62 GYNECOMASTIA: Primary | ICD-10-CM

## 2022-01-18 ENCOUNTER — LAB VISIT (OUTPATIENT)
Dept: LAB | Facility: HOSPITAL | Age: 42
End: 2022-01-18
Attending: INTERNAL MEDICINE
Payer: OTHER GOVERNMENT

## 2022-01-18 DIAGNOSIS — N62 GYNECOMASTIA: ICD-10-CM

## 2022-01-18 LAB — TSH SERPL DL<=0.005 MIU/L-ACNC: 1.19 UIU/ML (ref 0.4–4)

## 2022-01-18 PROCEDURE — 36415 COLL VENOUS BLD VENIPUNCTURE: CPT | Mod: PO | Performed by: INTERNAL MEDICINE

## 2022-01-18 PROCEDURE — 84443 ASSAY THYROID STIM HORMONE: CPT | Performed by: INTERNAL MEDICINE

## 2022-01-18 PROCEDURE — 82040 ASSAY OF SERUM ALBUMIN: CPT | Performed by: INTERNAL MEDICINE

## 2022-01-23 LAB
ALBUMIN SERPL-MCNC: 4.7 G/DL (ref 3.6–5.1)
SHBG SERPL-SCNC: 21 NMOL/L (ref 10–50)
TESTOST FREE SERPL-MCNC: 60 PG/ML (ref 46–224)
TESTOST SERPL-MCNC: 337 NG/DL (ref 250–1100)
TESTOSTERONE.FREE+WB SERPL-MCNC: 128.5 NG/DL (ref 110–575)

## 2022-07-26 RX ORDER — MELOXICAM 15 MG/1
TABLET ORAL
Qty: 90 TABLET | Refills: 3 | OUTPATIENT
Start: 2022-07-26

## 2022-08-10 ENCOUNTER — OFFICE VISIT (OUTPATIENT)
Dept: FAMILY MEDICINE | Facility: CLINIC | Age: 42
End: 2022-08-10
Payer: OTHER GOVERNMENT

## 2022-08-10 VITALS
SYSTOLIC BLOOD PRESSURE: 115 MMHG | OXYGEN SATURATION: 95 % | TEMPERATURE: 98 F | DIASTOLIC BLOOD PRESSURE: 70 MMHG | HEIGHT: 72 IN | BODY MASS INDEX: 31.13 KG/M2 | HEART RATE: 78 BPM | WEIGHT: 229.81 LBS

## 2022-08-10 DIAGNOSIS — F51.04 CHRONIC INSOMNIA: ICD-10-CM

## 2022-08-10 DIAGNOSIS — G25.81 RLS (RESTLESS LEGS SYNDROME): ICD-10-CM

## 2022-08-10 DIAGNOSIS — M15.9 PRIMARY OSTEOARTHRITIS INVOLVING MULTIPLE JOINTS: Primary | ICD-10-CM

## 2022-08-10 DIAGNOSIS — F43.10 PTSD (POST-TRAUMATIC STRESS DISORDER): ICD-10-CM

## 2022-08-10 PROCEDURE — 99213 OFFICE O/P EST LOW 20 MIN: CPT | Mod: PBBFAC,PO | Performed by: FAMILY MEDICINE

## 2022-08-10 PROCEDURE — 99999 PR PBB SHADOW E&M-EST. PATIENT-LVL III: CPT | Mod: PBBFAC,,, | Performed by: FAMILY MEDICINE

## 2022-08-10 PROCEDURE — 99214 OFFICE O/P EST MOD 30 MIN: CPT | Mod: S$PBB,,, | Performed by: FAMILY MEDICINE

## 2022-08-10 PROCEDURE — 99999 PR PBB SHADOW E&M-EST. PATIENT-LVL III: ICD-10-PCS | Mod: PBBFAC,,, | Performed by: FAMILY MEDICINE

## 2022-08-10 PROCEDURE — 99214 PR OFFICE/OUTPT VISIT, EST, LEVL IV, 30-39 MIN: ICD-10-PCS | Mod: S$PBB,,, | Performed by: FAMILY MEDICINE

## 2022-08-10 RX ORDER — ZOLPIDEM TARTRATE 5 MG/1
5 TABLET ORAL NIGHTLY PRN
Qty: 26 TABLET | Refills: 2 | Status: SHIPPED | OUTPATIENT
Start: 2022-08-10 | End: 2023-02-08

## 2022-08-10 RX ORDER — MELOXICAM 15 MG/1
TABLET ORAL
Qty: 90 TABLET | Refills: 0 | Status: SHIPPED | OUTPATIENT
Start: 2022-08-10 | End: 2023-02-01

## 2022-08-10 NOTE — PROGRESS NOTES
Chief Complaint:    Chief Complaint   Patient presents with    [facility]    Medication Refill    problems sleeping       History of Present Illness:    Patient is new to the practice,  He says he takes meloxicam 15 mg and has been on it for number of years.  He has arthritis involving the spine knees elbows neck and has had steroid injections of the back.  He has been on other NSAIDs for many years.  He was in the .    He also suffers with chronic insomnia says he cannot shut his mind down taken over-the-counter meds without much relief denies any depression anxiety.    [facility] does suffer with [facility].    ROS:  Review of Systems   Constitutional: Negative for activity change, chills, fatigue, fever and unexpected weight change.   HENT: Negative for congestion, ear discharge, ear pain, hearing loss, postnasal drip and rhinorrhea.    Eyes: Negative for pain and visual disturbance.   Respiratory: Negative for cough, chest tightness and shortness of breath.    Cardiovascular: Negative for chest pain and palpitations.   Gastrointestinal: Negative for abdominal pain, diarrhea and vomiting.   Endocrine: Negative for heat intolerance.   Genitourinary: Negative for dysuria, flank pain, frequency and hematuria.   Musculoskeletal: Negative for back pain, gait problem and neck pain.   Skin: Negative for color change and rash.   Neurological: Negative for dizziness, tremors, seizures, numbness and headaches.   Psychiatric/Behavioral: Negative for agitation, hallucinations, self-injury, sleep disturbance and suicidal ideas. The patient is not nervous/anxious.        Past Medical History:   Diagnosis Date    Tinnitus        Social History:  Social History     Socioeconomic History    Marital status:    Tobacco Use    Smoking status: Current Every Day Smoker     Packs/day: 0.25     Years: 27.00     Pack years: 6.75     Types: Vaping w/o nicotine, Cigarettes     Start date: 1990     Smokeless tobacco: Never Used   Substance and Sexual Activity    Alcohol use: No    Drug use: No    Sexual activity: Not Currently     Partners: Female   Social History Narrative    Live w/ spouse and  5 minor children        Family History:   family history includes Breast cancer in his maternal grandfather and mother; Heart attacks under age 50 in his maternal grandfather; Melanoma in his brother, father, mother, and sister.    Health Maintenance   Topic Date Due    Hepatitis C Screening  Never done    TETANUS VACCINE  Never done    Lipid Panel  06/12/2023       Physical Exam:    Vital Signs  Temp: 97.5 °F (36.4 °C)  Temp src: Tympanic  Pulse: 78  SpO2: 95 %  BP: 115/70  BP Location: Right arm  Patient Position: Sitting  Pain Score: 0-No pain  Height and Weight  Height: 6' (182.9 cm)  Weight: 104.2 kg (229 lb 13.3 oz)  BSA (Calculated - sq m): 2.3 sq meters  BMI (Calculated): 31.2  Weight in (lb) to have BMI = 25: 183.9]    Body mass index is 31.17 kg/m².    Physical Exam  Constitutional:       Appearance: He is well-developed.   Eyes:      Conjunctiva/sclera: Conjunctivae normal.      Pupils: Pupils are equal, round, and reactive to light.   Cardiovascular:      Rate and Rhythm: Normal rate and regular rhythm.      Heart sounds: Normal heart sounds. No murmur heard.  Pulmonary:      Effort: Pulmonary effort is normal. No respiratory distress.      Breath sounds: Normal breath sounds. No wheezing or rales.   Chest:      Chest wall: No tenderness.   Abdominal:      General: There is no distension.      Palpations: Abdomen is soft. There is no mass.      Tenderness: There is no abdominal tenderness. There is no guarding.   Musculoskeletal:         General: No tenderness.      Cervical back: Normal range of motion and neck supple.   Lymphadenopathy:      Cervical: No cervical adenopathy.   Skin:     General: Skin is warm and dry.   Neurological:      Mental Status: He is alert and oriented to person, place, and  time.      Deep Tendon Reflexes: Reflexes are normal and symmetric.   Psychiatric:         Behavior: Behavior normal.         Thought Content: Thought content normal.         Judgment: Judgment normal.           Assessment:      ICD-10-CM ICD-9-CM   1. Primary osteoarthritis involving multiple joints  M89.49 715.98   2. Chronic insomnia  F51.04 780.52   3. PTSD (post-traumatic stress disorder)  F43.10 309.81   4. RLS (restless legs syndrome)  G25.81 333.94         Plan:       Advised patient the long-term side effects of meloxicam including GI side effects cardiac and renal side effects.  For right now I filled meloxicam but advised him to look at other options.  4 mild arthritis symptoms he can take turmeric, also look into medical marijuana and possibly tramadol    Will try Ambien do not use on a regular basis also discuss cognitive behavioral therapy and meditation techniques he will try    PTSD stable    Follow-up as needed        No orders of the defined types were placed in this encounter.      Current Outpatient Medications   Medication Sig Dispense Refill    sildenafiL (VIAGRA) 100 MG tablet Take 1 tablet (100 mg total) by mouth daily as needed for Erectile Dysfunction. 30 tablet 3    meloxicam (MOBIC) 15 MG tablet TAKE 1 TABLET(15 MG) BY MOUTH EVERY DAY 90 tablet 0    zolpidem (AMBIEN) 5 MG Tab Take 1 tablet (5 mg total) by mouth nightly as needed. 26 tablet 2     No current facility-administered medications for this visit.       Medications Discontinued During This Encounter   Medication Reason    pramipexole (MIRAPEX) 0.25 MG tablet     meloxicam (MOBIC) 15 MG tablet Reorder       No follow-ups on file.      Mounika Monzon MD

## 2023-03-22 ENCOUNTER — OFFICE VISIT (OUTPATIENT)
Dept: UROLOGY | Facility: CLINIC | Age: 43
End: 2023-03-22
Payer: OTHER GOVERNMENT

## 2023-03-22 VITALS
HEIGHT: 72 IN | DIASTOLIC BLOOD PRESSURE: 79 MMHG | SYSTOLIC BLOOD PRESSURE: 133 MMHG | HEART RATE: 78 BPM | BODY MASS INDEX: 31.95 KG/M2 | WEIGHT: 235.88 LBS | TEMPERATURE: 98 F | RESPIRATION RATE: 18 BRPM

## 2023-03-22 DIAGNOSIS — N52.1 ERECTILE DYSFUNCTION DUE TO DISEASES CLASSIFIED ELSEWHERE: ICD-10-CM

## 2023-03-22 DIAGNOSIS — R35.0 URINARY FREQUENCY: Primary | ICD-10-CM

## 2023-03-22 DIAGNOSIS — M54.16 LUMBAR RADICULOPATHY: ICD-10-CM

## 2023-03-22 PROCEDURE — 99999 PR PBB SHADOW E&M-EST. PATIENT-LVL III: ICD-10-PCS | Mod: PBBFAC,,, | Performed by: UROLOGY

## 2023-03-22 PROCEDURE — 99213 OFFICE O/P EST LOW 20 MIN: CPT | Mod: PBBFAC,PN | Performed by: UROLOGY

## 2023-03-22 PROCEDURE — 99214 PR OFFICE/OUTPT VISIT, EST, LEVL IV, 30-39 MIN: ICD-10-PCS | Mod: S$PBB,,, | Performed by: UROLOGY

## 2023-03-22 PROCEDURE — 99214 OFFICE O/P EST MOD 30 MIN: CPT | Mod: S$PBB,,, | Performed by: UROLOGY

## 2023-03-22 PROCEDURE — 99999 PR PBB SHADOW E&M-EST. PATIENT-LVL III: CPT | Mod: PBBFAC,,, | Performed by: UROLOGY

## 2023-03-22 RX ORDER — SILDENAFIL 100 MG/1
100 TABLET, FILM COATED ORAL DAILY PRN
Qty: 90 TABLET | Refills: 3 | Status: SHIPPED | OUTPATIENT
Start: 2023-03-22

## 2023-03-22 NOTE — PROGRESS NOTES
Chief Complaint   Patient presents with    Other     F/u med refill         History of Present Illness:   Matthew Graham Blakemore is a 42 y.o. male here for follow up.     3/[birth year]/23-41yo male, here for f/u of ED. He has a history of difficulty maintaining erections. He has been using Viagra with good results. Libido is good. Pt with chronic back pain, and feels like his back may be the cause of his symptoms. Sometimes has hesitancy, but not bothersome. No gross hematuria.     Review of Systems   Constitutional:  Negative for chills and fever.   Musculoskeletal:  Positive for back pain.   All other systems reviewed and are negative.    Current Outpatient Medications   Medication Sig Dispense Refill    meloxicam (MOBIC) 15 MG tablet TAKE 1 TABLET DAILY 90 tablet 3    sildenafiL (VIAGRA) 100 MG tablet Take 1 tablet (100 mg total) by mouth daily as needed for Erectile Dysfunction. 90 tablet 3    zolpidem (AMBIEN) 5 MG Tab Take 1 tablet (5 mg total) by mouth nightly as needed. 26 tablet 2     No current facility-administered medications for this visit.       Review of patient's allergies indicates:   Allergen Reactions    Penicillins Other (See Comments)     unknown       Past medical, family, and social history reviewed as documented in chart with pertinent positive medical, family, and social history detailed in HPI.    Physical Exam  Vitals:    03/22/23 1709   BP: 133/79   Pulse: 78   Resp: 18   Temp: 98.3 °F (36.8 °C)       General: Well-developed, well-nourished, in no acute distress  HEENT: Normocephalic, atraumatic, extraocular eye movements in tact  Neck: supple, trachea midline, no cervical or supraclavicular adenopathy  Respirations: Even and unlabored  Extremities: Moves all extremities equally, atraumatic, no clubbing, cyanosis, edema  Skin: warm and dry, no lesions  Psych: normal affect  Neuro: Alert and oriented x 3. Cranial nerves II-XII grossly intact    Urinalysis  Color, UA   Date Value Ref Range Status    02/05/2020 YELLOW  Final     pH, UA   Date Value Ref Range Status   02/05/2020 6  Final     Spec Grav UA   Date Value Ref Range Status   02/05/2020 1.010  Final     Nitrite, UA   Date Value Ref Range Status   02/05/2020 NEG  Final       Lab Results   Component Value Date    PSA 0.55 06/22/2021       Assessment:   1. Erectile dysfunction due to diseases classified elsewhere        2. Lumbar radiculopathy              Plan:  Erectile dysfunction due to diseases classified elsewhere    Lumbar radiculopathy    Other orders  -     sildenafiL (VIAGRA) 100 MG tablet; Take 1 tablet (100 mg total) by mouth daily as needed for Erectile Dysfunction.  Dispense: 90 tablet; Refill: 3        Follow up in about 1 year (around 3/22/2024).

## 2023-08-14 NOTE — TELEPHONE ENCOUNTER
Attempted to call patient regarding appointment that is scheduled with Dr. Nugent today 11/05/2020. We will need to r/s appointment due to Dr. Nugent being out of office due to a family emergency. Left v/m for patient to call me back to discuss options.    
No

## 2023-12-04 DIAGNOSIS — G25.81 RLS (RESTLESS LEGS SYNDROME): Primary | ICD-10-CM

## 2023-12-04 RX ORDER — MELOXICAM 15 MG/1
TABLET ORAL
Qty: 90 TABLET | Refills: 3 | OUTPATIENT
Start: 2023-12-04

## 2023-12-04 NOTE — TELEPHONE ENCOUNTER
Care Due:                  Date            Visit Type   Department     Provider  --------------------------------------------------------------------------------                                NP -                              PRIMARY      [facility]  Last Visit: 08-      CARE ([facility])   MEDICINE       Mounika Monzon  Next Visit: None Scheduled  None         None Found                                                            Last  Test          Frequency    Reason                     Performed    Due Date  --------------------------------------------------------------------------------    Office Visit  12 months..  meloxicam................  08-   08-    CBC.........  12 months..  meloxicam................  Not Found    Overdue    CMP.........  12 months..  meloxicam................  Not Found    Overdue    [employer] Embedded Care Due Messages. Reference number: [id number].   12/04/2023 1:01:06 AM CST

## 2024-03-18 ENCOUNTER — TELEPHONE (OUTPATIENT)
Dept: PHYSICAL MEDICINE AND REHAB | Facility: CLINIC | Age: 44
End: 2024-03-18
Payer: OTHER GOVERNMENT

## 2024-03-18 ENCOUNTER — OFFICE VISIT (OUTPATIENT)
Dept: FAMILY MEDICINE | Facility: CLINIC | Age: 44
End: 2024-03-18
Payer: OTHER GOVERNMENT

## 2024-03-18 ENCOUNTER — LAB VISIT (OUTPATIENT)
Dept: LAB | Facility: HOSPITAL | Age: 44
End: 2024-03-18
Attending: FAMILY MEDICINE
Payer: OTHER GOVERNMENT

## 2024-03-18 VITALS
OXYGEN SATURATION: 97 % | SYSTOLIC BLOOD PRESSURE: 136 MMHG | HEART RATE: 67 BPM | BODY MASS INDEX: 31.86 KG/M2 | WEIGHT: 234.88 LBS | DIASTOLIC BLOOD PRESSURE: 88 MMHG

## 2024-03-18 DIAGNOSIS — B07.9 VIRAL WARTS, UNSPECIFIED TYPE: ICD-10-CM

## 2024-03-18 DIAGNOSIS — B49 FUNGUS INFECTION: ICD-10-CM

## 2024-03-18 DIAGNOSIS — Z87.891 QUIT SMOKING: ICD-10-CM

## 2024-03-18 DIAGNOSIS — Z00.00 WELL ADULT EXAM: Primary | ICD-10-CM

## 2024-03-18 DIAGNOSIS — Z00.00 WELL ADULT EXAM: ICD-10-CM

## 2024-03-18 DIAGNOSIS — R20.0 THIGH NUMBNESS: ICD-10-CM

## 2024-03-18 LAB
ALBUMIN SERPL BCP-MCNC: 4.2 G/DL (ref 3.5–5.2)
ALP SERPL-CCNC: 70 U/L (ref 55–135)
ALT SERPL W/O P-5'-P-CCNC: 34 U/L (ref 10–44)
ANION GAP SERPL CALC-SCNC: 9 MMOL/L (ref 8–16)
AST SERPL-CCNC: 24 U/L (ref 10–40)
BASOPHILS # BLD AUTO: 0.09 K/UL (ref 0–0.2)
BASOPHILS NFR BLD: 1.8 % (ref 0–1.9)
BILIRUB SERPL-MCNC: 0.4 MG/DL (ref 0.1–1)
BUN SERPL-MCNC: 12 MG/DL (ref 6–20)
CALCIUM SERPL-MCNC: 9.9 MG/DL (ref 8.7–10.5)
CHLORIDE SERPL-SCNC: 109 MMOL/L (ref 95–110)
CHOLEST SERPL-MCNC: 186 MG/DL (ref 120–199)
CHOLEST/HDLC SERPL: 5.5 {RATIO} (ref 2–5)
CO2 SERPL-SCNC: 24 MMOL/L (ref 23–29)
CREAT SERPL-MCNC: 0.9 MG/DL (ref 0.5–1.4)
DIFFERENTIAL METHOD BLD: ABNORMAL
EOSINOPHIL # BLD AUTO: 0.2 K/UL (ref 0–0.5)
EOSINOPHIL NFR BLD: 3.4 % (ref 0–8)
ERYTHROCYTE [DISTWIDTH] IN BLOOD BY AUTOMATED COUNT: 13.1 % (ref 11.5–14.5)
EST. GFR  (NO RACE VARIABLE): >60 ML/MIN/1.73 M^2
ESTIMATED AVG GLUCOSE: 100 MG/DL (ref 68–131)
GLUCOSE SERPL-MCNC: 93 MG/DL (ref 70–110)
HBA1C MFR BLD: 5.1 % (ref 4–5.6)
HCT VFR BLD AUTO: 47.9 % (ref 40–54)
HCV AB SERPL QL IA: NORMAL
HDLC SERPL-MCNC: 34 MG/DL (ref 40–75)
HDLC SERPL: 18.3 % (ref 20–50)
HGB BLD-MCNC: 15.8 G/DL (ref 14–18)
HIV 1+2 AB+HIV1 P24 AG SERPL QL IA: NORMAL
IMM GRANULOCYTES # BLD AUTO: 0.03 K/UL (ref 0–0.04)
IMM GRANULOCYTES NFR BLD AUTO: 0.6 % (ref 0–0.5)
LDLC SERPL CALC-MCNC: 128.4 MG/DL (ref 63–159)
LYMPHOCYTES # BLD AUTO: 1.7 K/UL (ref 1–4.8)
LYMPHOCYTES NFR BLD: 33.3 % (ref 18–48)
MCH RBC QN AUTO: 28.4 PG (ref 27–31)
MCHC RBC AUTO-ENTMCNC: 33 G/DL (ref 32–36)
MCV RBC AUTO: 86 FL (ref 82–98)
MONOCYTES # BLD AUTO: 0.4 K/UL (ref 0.3–1)
MONOCYTES NFR BLD: 7.1 % (ref 4–15)
NEUTROPHILS # BLD AUTO: 2.7 K/UL (ref 1.8–7.7)
NEUTROPHILS NFR BLD: 53.8 % (ref 38–73)
NONHDLC SERPL-MCNC: 152 MG/DL
NRBC BLD-RTO: 0 /100 WBC
PLATELET # BLD AUTO: 273 K/UL (ref 150–450)
PMV BLD AUTO: 10.9 FL (ref 9.2–12.9)
POTASSIUM SERPL-SCNC: 4.1 MMOL/L (ref 3.5–5.1)
PROT SERPL-MCNC: 7.1 G/DL (ref 6–8.4)
RBC # BLD AUTO: 5.56 M/UL (ref 4.6–6.2)
SODIUM SERPL-SCNC: 142 MMOL/L (ref 136–145)
TRIGL SERPL-MCNC: 118 MG/DL (ref 30–150)
WBC # BLD AUTO: 4.95 K/UL (ref 3.9–12.7)

## 2024-03-18 PROCEDURE — 99213 OFFICE O/P EST LOW 20 MIN: CPT | Mod: PBBFAC,PO | Performed by: FAMILY MEDICINE

## 2024-03-18 PROCEDURE — 86803 HEPATITIS C AB TEST: CPT | Performed by: FAMILY MEDICINE

## 2024-03-18 PROCEDURE — 99396 PREV VISIT EST AGE 40-64: CPT | Mod: 25,S$PBB,, | Performed by: FAMILY MEDICINE

## 2024-03-18 PROCEDURE — 83036 HEMOGLOBIN GLYCOSYLATED A1C: CPT | Performed by: FAMILY MEDICINE

## 2024-03-18 PROCEDURE — 80053 COMPREHEN METABOLIC PANEL: CPT | Performed by: FAMILY MEDICINE

## 2024-03-18 PROCEDURE — 80061 LIPID PANEL: CPT | Performed by: FAMILY MEDICINE

## 2024-03-18 PROCEDURE — 17000 DESTRUCT PREMALG LESION: CPT | Mod: PBBFAC,PO | Performed by: FAMILY MEDICINE

## 2024-03-18 PROCEDURE — 87389 HIV-1 AG W/HIV-1&-2 AB AG IA: CPT | Performed by: FAMILY MEDICINE

## 2024-03-18 PROCEDURE — 17110 DESTRUCTION B9 LES UP TO 14: CPT | Mod: S$PBB,,, | Performed by: FAMILY MEDICINE

## 2024-03-18 PROCEDURE — 99999 PR PBB SHADOW E&M-EST. PATIENT-LVL III: CPT | Mod: PBBFAC,,, | Performed by: FAMILY MEDICINE

## 2024-03-18 PROCEDURE — 85025 COMPLETE CBC W/AUTO DIFF WBC: CPT | Performed by: FAMILY MEDICINE

## 2024-03-18 PROCEDURE — 36415 COLL VENOUS BLD VENIPUNCTURE: CPT | Mod: PO | Performed by: FAMILY MEDICINE

## 2024-03-18 RX ORDER — NYSTATIN 100000 [USP'U]/G
POWDER TOPICAL 2 TIMES DAILY
Qty: 60 G | Refills: 6 | Status: SHIPPED | OUTPATIENT
Start: 2024-03-18

## 2024-03-18 NOTE — TELEPHONE ENCOUNTER
----- Message from Richa Jean MA sent at 3/18/2024  9:02 AM CDT -----  Please schedule NCT for pt

## 2024-03-18 NOTE — PROGRESS NOTES
Chief Complaint:    No chief complaint on file.      History of Present Illness:    Patient with PTSD, arthritis, and insomnia presents today for annual,     BP today diastolic borderline elevated.   Exercises regularly with walking and yoga.     He says he takes meloxicam 15 mg and has been on it for number of years.  He has arthritis involving the spine knees elbows neck and has had steroid injections of the back.  He has been on other NSAIDs for many years.  He was in the .    He also suffers with chronic insomnia, is on Ambien to help manage.   [facility] does suffer with [facility] and [facility].    Lesion on L knee  Athletes foot of left foot    Right lateral thigh numbness, says it is intermittent and nothing specific brings it on.     No longer smoking for a year. Makes his own alcohol and may have a wine glass a week.     ROS:  Review of Systems   Constitutional:  Negative for activity change, chills, fatigue, fever and unexpected weight change.   HENT:  Negative for congestion, ear discharge, ear pain, hearing loss, postnasal drip and rhinorrhea.    Eyes:  Negative for pain and visual disturbance.   Respiratory:  Negative for cough, chest tightness and shortness of breath.    Cardiovascular:  Negative for chest pain and palpitations.   Gastrointestinal:  Negative for abdominal pain, diarrhea and vomiting.   Endocrine: Negative for heat intolerance.   Genitourinary:  Negative for dysuria, flank pain, frequency and hematuria.   Musculoskeletal:  Negative for back pain, gait problem and neck pain.   Skin:  Negative for color change and rash.   Neurological:  Positive for numbness. Negative for dizziness, tremors, seizures and headaches.   Psychiatric/Behavioral:  Negative for agitation, hallucinations, self-injury, sleep disturbance and suicidal ideas. The patient is not nervous/anxious.        Past Medical History:   Diagnosis Date    Tinnitus        Social History:  Social History      Socioeconomic History    Marital status:    Tobacco Use    Smoking status: Every Day     Current packs/day: 0.25     Average packs/day: 0.3 packs/day for 34.2 years (8.6 ttl pk-yrs)     Types: Vaping w/o nicotine, Cigarettes     Start date: 1990    Smokeless tobacco: Never   Substance and Sexual Activity    Alcohol use: No    Drug use: No    Sexual activity: Not Currently     Partners: Female   Social History Narrative    Live w/ spouse and  5 minor children      Social Determinants of Health     Financial Resource Strain: Low Risk  (1/14/2020)    Overall Financial Resource Strain (CARDIA)     Difficulty of Paying Living Expenses: Not very hard   Food Insecurity: No Food Insecurity (1/14/2020)    Hunger Vital Sign     Worried About Running Out of Food in the Last Year: Never true     Ran Out of Food in the Last Year: Never true   Transportation Needs: No Transportation Needs (1/14/2020)    PRAPARE - Transportation     Lack of Transportation (Medical): No     Lack of Transportation (Non-Medical): No   Physical Activity: Insufficiently Active (1/14/2020)    Exercise Vital Sign     Days of Exercise per Week: 4 days     Minutes of Exercise per Session: 30 min   Stress: No Stress Concern Present (1/14/2020)    [employer] - Occupational Stress Questionnaire     Feeling of Stress : Not at all   Social Connections: Unknown (1/14/2020)    Social Connection and Isolation Panel [NHANES]     Frequency of Communication with Friends and Family: More than three times a week     Frequency of Social Gatherings with Friends and Family: Twice a week     Active Member of Clubs or Organizations: Yes     Attends Club or Organization Meetings: 1 to 4 times per year     Marital Status:        Family History:   family history includes Breast cancer in his maternal grandfather and mother; Heart attacks under age 50 in his maternal grandfather; Melanoma in his brother, father, mother, and  sister.    Health Maintenance   Topic Date Due    Hepatitis C Screening  Never done    TETANUS VACCINE  Never done    Lipid Panel  06/12/2023       Physical Exam:    Vital Signs  Pulse: 67  SpO2: 97 %  BP: 136/88  BP Location: Left arm  Patient Position: Sitting  Height and Weight  Weight: 106.5 kg (234 lb 14.4 oz)]    Body mass index is 31.86 kg/m².    Physical Exam  Constitutional:       Appearance: He is well-developed.   Eyes:      Conjunctiva/sclera: Conjunctivae normal.      Pupils: Pupils are equal, round, and reactive to light.   Cardiovascular:      Rate and Rhythm: Normal rate and regular rhythm.      Heart sounds: Normal heart sounds. No murmur heard.  Pulmonary:      Effort: Pulmonary effort is normal. No respiratory distress.      Breath sounds: Normal breath sounds. No wheezing or rales.   Chest:      Chest wall: No tenderness.   Abdominal:      General: There is no distension.      Palpations: Abdomen is soft. There is no mass.      Tenderness: There is no abdominal tenderness. There is no guarding.   Musculoskeletal:         General: No tenderness.      Cervical back: Normal range of motion and neck supple.        Legs:       Comments: No sensation as marked.    Lymphadenopathy:      Cervical: No cervical adenopathy.   Skin:     General: Skin is warm and dry.      Findings: Lesion present.          Neurological:      Mental Status: He is alert and oriented to person, place, and time.      Deep Tendon Reflexes: Reflexes are normal and symmetric.   Psychiatric:         Behavior: Behavior normal.         Thought Content: Thought content normal.         Judgment: Judgment normal.           Assessment:      ICD-10-CM ICD-9-CM   1. Well adult exam  Z00.00 V70.0   2. Viral warts, unspecified type  B07.9 078.10   3. Fungus infection  B49 117.9   4. Quit smoking  Z87.891 V15.82   5. Thigh numbness  R20.0 782.0           Plan:    Discussed Liquid nitrogen cryotherapy to viral wart on L knee. Discussed risks  and benefits with pt such as 40% chance of lesion coming back. Pt understand and agrees to treatment.   Tolerated well, no complications.    Start nystatin powder for fungus infection of Left foot. Can also try some tea tree oil with soap or olive oil.   Order nerve conduction test for Right thigh numbness.  PTSD stable  Continue smoking cessation.  Monitor blood pressure 2-3 times per week. Check two hours after taking medication. Keep below 139/89.     Labs today.   Follow-up as needed    Orders Placed This Encounter   Procedures    CBC Auto Differential    Comprehensive Metabolic Panel    Hemoglobin A1C    Lipid Panel    HIV 1/2 Ag/Ab (4th Gen)    Hepatitis C Antibody    Nerve conduction test       Current Outpatient Medications   Medication Sig Dispense Refill    meloxicam (MOBIC) 15 MG tablet TAKE 1 TABLET DAILY 90 tablet 3    sildenafiL (VIAGRA) 100 MG tablet Take 1 tablet (100 mg total) by mouth daily as needed for Erectile Dysfunction. 90 tablet 3    nystatin (MYCOSTATIN) powder Apply topically 2 (two) times daily. 60 g 6    zolpidem (AMBIEN) 5 MG Tab Take 1 tablet (5 mg total) by mouth nightly as needed. (Patient not taking: Reported on 3/18/2024) 26 tablet 2     No current facility-administered medications for this visit.       There are no discontinued medications.      No follow-ups on file.      Mounika Monzon MD    Scribe Attestation:   I, Desmond Johnson, am scribing for, and in the presence of, Dr.Arif Monzon I performed the above scribed service and the documentation accurately describes the services I performed. I attest to the accuracy of the note.    I, Dr. Mounika Monzon, reviewed documentation as scribed above. I performed the services described in this documentation.  I agree that the record reflects my personal performance and is accurate and complete. Mounika Monzon MD.  03/18/2024

## 2024-03-22 ENCOUNTER — OFFICE VISIT (OUTPATIENT)
Dept: PHYSICAL MEDICINE AND REHAB | Facility: CLINIC | Age: 44
End: 2024-03-22
Payer: OTHER GOVERNMENT

## 2024-03-22 VITALS — RESPIRATION RATE: 13 BRPM | WEIGHT: 234.81 LBS | BODY MASS INDEX: 31.8 KG/M2 | HEIGHT: 72 IN

## 2024-03-22 DIAGNOSIS — M54.16 LUMBAR RADICULOPATHY: ICD-10-CM

## 2024-03-22 PROCEDURE — 95908 NRV CNDJ TST 3-4 STUDIES: CPT | Mod: PBBFAC | Performed by: PHYSICAL MEDICINE & REHABILITATION

## 2024-03-22 PROCEDURE — 95886 MUSC TEST DONE W/N TEST COMP: CPT | Mod: PBBFAC | Performed by: PHYSICAL MEDICINE & REHABILITATION

## 2024-03-22 PROCEDURE — 99499 UNLISTED E&M SERVICE: CPT | Mod: S$PBB,,, | Performed by: PHYSICAL MEDICINE & REHABILITATION

## 2024-03-22 PROCEDURE — 99213 OFFICE O/P EST LOW 20 MIN: CPT | Mod: PBBFAC,25 | Performed by: PHYSICAL MEDICINE & REHABILITATION

## 2024-03-22 PROCEDURE — 99999 PR PBB SHADOW E&M-EST. PATIENT-LVL III: CPT | Mod: PBBFAC,,, | Performed by: PHYSICAL MEDICINE & REHABILITATION

## 2024-03-22 PROCEDURE — 95908 NRV CNDJ TST 3-4 STUDIES: CPT | Mod: 26,S$PBB,, | Performed by: PHYSICAL MEDICINE & REHABILITATION

## 2024-03-22 PROCEDURE — 95886 MUSC TEST DONE W/N TEST COMP: CPT | Mod: 26,S$PBB,, | Performed by: PHYSICAL MEDICINE & REHABILITATION

## 2024-03-22 NOTE — PROGRESS NOTES
[facility]   [address]  [place] [address]  Phone: [phone]        Full Name: Matthew Blakemore YOB: 1980  Patient ID: [id number]      Visit Date: 3/22/2024 10:01  Age: 43 Years 7 Months Old  Examining Physician: Mishel Perez M.D.  Referring Physician: Dr Monzon  Reason for Referral: lower ext      Chief Complaint   Patient presents with    Leg Pain     Right post-lateral thigh       HPI: This is a 43 y.o.  male being seen in clinic today for evaluation of chronic right lateral thigh to knee the knee numbness.  His symptoms have remained constant but can increase with certain positions or movements.  Nothing really provides significant relief.     History obtained from patient    Past family, medical, social, and surgical history reviewed in chart    Review of Systems:     General- denies lethargy, weight change, fever, chills  Head/neck- denies swallowing difficulties  ENT- denies hearing changes  Cardiovascular-denies chest pain  Pulmonary- denies shortness of breath  GI- denies constipation or bowel incontinence  - denies bladder incontinence  Skin- denies wounds or rashes  Musculoskeletal- denies weakness, + pain  Neurologic- + numbness and tingling  Psychiatric- denies depressive or psychotic features, denies anxiety  Lymphatic-denies swelling  Endocrine- denies hypoglycemic symptoms/DM history  All other pertinent systems negative     Physical Examination:  General: Well developed, well nourished male, NAD  HEENT:NCAT EOMI bilaterally   Pulmonary:Normal respirations    Spinal Examination: CERVICAL  Active ROM is within normal limits.  Inspection: No deformity of spinal alignment.    Spinal Examination: LUMBAR or THORACIC  Active ROM is within normal limits.  Inspection: No deformity of spinal alignment.    Palpation: No vertebral tenderness to percussion.        Bilateral Upper and Lower Extremities:  Pulses are 2+ at radial  bilaterally.  Shoulder/Elbow/Wrist/Hand ROM   Hip/Knee/Ankle ROM   Bilateral Extremities show normal capillary refill.  No signs of cyanosis, rubor, edema, skin changes, or dysvascular changes of appendages.  Nails appear intact.    Neurological Exam:  Cranial Nerves:  II-XII grossly intact    Manual Muscle Testing: (Motor 5=normal)  5/5 strength bilateral upper/lower extremities    No focal atrophy is noted of either lower extremity.    Bilateral Reflexes:  No clonus at knee or ankle.    Sensation: tested to light touch  - intact in legs except dec at right lateral thigh to lateral knee and below in L5 distribution  Gait: Narrow base and good arm swing.      Entire procedure explained to patient prior to proceeding.  Verbal consent obtained      SNC      Nerve / Sites Rec. Site Onset Lat Peak Lat Amp Segments Distance Velocity     ms ms µV  mm m/s   R Sural - Ankle (Calf)      Calf Ankle 3.1 3.9 12.1 Calf - Ankle 140 45   R Superficial peroneal - Ankle      Lat leg Ankle 2.6 3.4 10.8 Lat leg - Ankle 140 54       MNC      Nerve / Sites Muscle Latency Amplitude Duration Rel Amp Segments Distance Lat Diff Velocity     ms mV ms %  mm ms m/s   R Peroneal - EDB      Ankle EDB 4.0 7.7 6.2 100 Ankle - EDB 80        Fibular head EDB 11.2 6.7 7.1 87.6 Fibular head - Ankle 360 7.2 50      Pop fossa EDB 12.6 6.6 6.8 97.5 Pop fossa - Fibular head 70 1.4 52   R Tibial - AH      Ankle AH 4.4 13.1 5.6 100 Ankle - Ankle 80        Pop fossa AH 15.2 10.9  83.2 Pop fossa - Ankle 450 10.8 42       EMG         EMG Summary Table     Spontaneous MUAP Recruitment   Muscle IA Fib PSW Fasc Other Dur. Dur Amp Dur Polys Pattern Effort   R. Rectus femoris N None None None .   N N N N Max   R. Extensor digitorum brevis N None None None .   N N 1+ sl red Max   R. Abductor hallucis N None None None .   N N N N Max   R. Tibialis anterior N None None None .   N N 1+ sl red Max   R. Gastrocnemius (Medial head) N None None None .   N N N N Max                                 INTERPRETATION  -Right superficial peroneal sensory nerve conduction study showed normal peak latency and amplitude  -Right sural sensory nerve conduction study showed normal peak latency and amplitude  -Right peroneal motor nerve conduction study showed normal latency, amplitude, and conduction velocity  -Right tibial motor nerve conduction study showed normal latency, amplitude, and conduction velocity  -Needle EMG examination performed to above mentioned muscles       IMPRESSION  ABNORMAL Study  2. There is electrodiagnostic evidence of a chronic radiculopathy of the right L5 nerve root    PLAN  Discussed in detail for greater than 30 minutes about diagnosis and treatment plan    1. Follow up with referring provider: Dr. Mounika Monzon  2. Handouts on lumbar radic and exercises provided  3. This study is good for one year. If symptoms worsen or do not improve, please re-consult.    Mishel Perez M.D.  Physical Medicine and Rehab

## 2025-03-19 ENCOUNTER — OFFICE VISIT (OUTPATIENT)
Dept: UROLOGY | Facility: CLINIC | Age: 45
End: 2025-03-19
Payer: OTHER GOVERNMENT

## 2025-03-19 VITALS
HEIGHT: 72 IN | BODY MASS INDEX: 30.61 KG/M2 | RESPIRATION RATE: 18 BRPM | SYSTOLIC BLOOD PRESSURE: 135 MMHG | HEART RATE: 84 BPM | DIASTOLIC BLOOD PRESSURE: 89 MMHG | WEIGHT: 226 LBS

## 2025-03-19 DIAGNOSIS — N52.02 CORPORO-VENOUS OCCLUSIVE ERECTILE DYSFUNCTION: Primary | ICD-10-CM

## 2025-03-19 LAB
BILIRUB UR QL STRIP: NEGATIVE
GLUCOSE UR QL STRIP: NEGATIVE
KETONES UR QL STRIP: NEGATIVE
LEUKOCYTE ESTERASE UR QL STRIP: NEGATIVE
PH, POC UA: 6
POC BLOOD, URINE: NEGATIVE
POC NITRATES, URINE: NEGATIVE
PROT UR QL STRIP: NEGATIVE
SP GR UR STRIP: 1.02 (ref 1–1.03)
UROBILINOGEN UR STRIP-ACNC: 0.2 (ref 0.3–2.2)

## 2025-03-19 PROCEDURE — 99213 OFFICE O/P EST LOW 20 MIN: CPT | Mod: S$PBB,,, | Performed by: UROLOGY

## 2025-03-19 PROCEDURE — 99213 OFFICE O/P EST LOW 20 MIN: CPT | Mod: PBBFAC,PN | Performed by: UROLOGY

## 2025-03-19 PROCEDURE — 99999 PR PBB SHADOW E&M-EST. PATIENT-LVL III: CPT | Mod: PBBFAC,,, | Performed by: UROLOGY

## 2025-03-19 PROCEDURE — 81003 URINALYSIS AUTO W/O SCOPE: CPT | Mod: PBBFAC,PN | Performed by: UROLOGY

## 2025-03-19 PROCEDURE — 99999PBSHW POCT URINALYSIS, DIPSTICK, AUTOMATED, W/O SCOPE: Mod: PBBFAC,,,

## 2025-03-19 RX ORDER — SILDENAFIL 100 MG/1
100 TABLET, FILM COATED ORAL DAILY PRN
Qty: 90 TABLET | Refills: 3 | Status: SHIPPED | OUTPATIENT
Start: 2025-03-19

## 2025-03-19 RX ORDER — TADALAFIL 20 MG/1
20 TABLET ORAL DAILY
Qty: 30 TABLET | Refills: 11 | Status: SHIPPED | OUTPATIENT
Start: 2025-03-19 | End: 2026-03-19

## 2025-03-19 NOTE — PROGRESS NOTES
Chief Complaint   Patient presents with    Annual Exam         History of Present Illness:   Matthew Graham Blakemore is a 44 y.o. male here for follow up.     3/19/25-ED follow up today. Using viagra, which was working well and then  and maybe didn't work as well. Sometimes he wishes he didn't have to plan for intercourse. Libido is good. Denies weak stream, stranguria or gross hematuria.   3/22/23-43yo male, here for f/u of ED. He has a history of difficulty maintaining erections. He has been using Viagra with good results. Libido is good. Pt with chronic back pain, and feels like his back may be the cause of his symptoms. Sometimes has hesitancy, but not bothersome. No gross hematuria.     Review of Systems   Constitutional:  Negative for chills and fever.   Respiratory:  Negative for shortness of breath.    Cardiovascular:  Negative for chest pain.   Gastrointestinal:  Negative for abdominal pain.   All other systems reviewed and are negative.      Current Outpatient Medications   Medication Sig Dispense Refill    meloxicam (MOBIC) 15 MG tablet TAKE 1 TABLET DAILY 90 tablet 3    nystatin (MYCOSTATIN) powder Apply topically 2 (two) times daily. 60 g 6    sildenafiL (VIAGRA) 100 MG tablet Take 1 tablet (100 mg total) by mouth daily as needed for Erectile Dysfunction. 90 tablet 3    tadalafiL (CIALIS) 20 MG Tab Take 1 tablet (20 mg total) by mouth once daily. 30 tablet 11    zolpidem (AMBIEN) 5 MG Tab Take 1 tablet (5 mg total) by mouth nightly as needed. (Patient not taking: Reported on 3/18/2024) 26 tablet 2     No current facility-administered medications for this visit.       Review of patient's allergies indicates:   Allergen Reactions    Penicillins Other (See Comments)     unknown       Past medical, family, and social history reviewed as documented in chart with pertinent positive medical, family, and social history detailed in HPI.    Physical Exam  Vitals:    25 1011   BP: 135/89   Pulse: 84  Pt last saw PCP 9/8/20 for a MWV. Pending appt 3/16/21 six month f/u. Pending appt 9/17/21 MWV.    blood glucose (ONE TOUCH ULTRA TEST) test strips last ordered 9/24/2019 #100 strips R3  Daily.    Refills provided adhering to standing orders. Pt is current with visits.     Resp: 18       General: Well-developed, well-nourished, in no acute distress  HEENT: Normocephalic, atraumatic, extraocular eye movements in tact  Neck: supple, trachea midline, no cervical or supraclavicular adenopathy  Respirations: Even and unlabored  Extremities: Moves all extremities equally, atraumatic, no clubbing, cyanosis, edema  Skin: warm and dry, no lesions  Psych: normal affect  Neuro: Alert and oriented x 3. Cranial nerves II-XII grossly intact    Urinalysis  Color, UA   Date Value Ref Range Status   02/05/2020 YELLOW  Final     pH, UA   Date Value Ref Range Status   02/05/2020 6  Final     Spec Grav UA   Date Value Ref Range Status   02/05/2020 1.010  Final     Nitrite, UA   Date Value Ref Range Status   02/05/2020 NEG  Final       Lab Results   Component Value Date    PSA 0.55 06/22/2021       Assessment:   1. Corporo-venous occlusive erectile dysfunction  POCT Urinalysis, Dipstick, Automated, W/O Scope            Plan:  Corporo-venous occlusive erectile dysfunction  -     POCT Urinalysis, Dipstick, Automated, W/O Scope    Other orders  -     sildenafiL (VIAGRA) 100 MG tablet; Take 1 tablet (100 mg total) by mouth daily as needed for Erectile Dysfunction.  Dispense: 90 tablet; Refill: 3  -     tadalafiL (CIALIS) 20 MG Tab; Take 1 tablet (20 mg total) by mouth once daily.  Dispense: 30 tablet; Refill: 11    Discussed that he may use daily cialis or Viagra, but not both    Follow up in about 1 year (around 3/19/2026).

## 2025-06-27 ENCOUNTER — OFFICE VISIT (OUTPATIENT)
Dept: FAMILY MEDICINE | Facility: CLINIC | Age: 45
End: 2025-06-27
Payer: OTHER GOVERNMENT

## 2025-06-27 ENCOUNTER — HOSPITAL ENCOUNTER (OUTPATIENT)
Dept: RADIOLOGY | Facility: HOSPITAL | Age: 45
Discharge: HOME | End: 2025-06-27
Attending: STUDENT IN AN ORGANIZED HEALTH CARE EDUCATION/TRAINING PROGRAM
Payer: OTHER GOVERNMENT

## 2025-06-27 VITALS
HEIGHT: 72 IN | SYSTOLIC BLOOD PRESSURE: 134 MMHG | OXYGEN SATURATION: 99 % | BODY MASS INDEX: 30.01 KG/M2 | WEIGHT: 221.56 LBS | DIASTOLIC BLOOD PRESSURE: 86 MMHG | HEART RATE: 75 BPM

## 2025-06-27 DIAGNOSIS — R10.32 LEFT LOWER QUADRANT ABDOMINAL PAIN: ICD-10-CM

## 2025-06-27 DIAGNOSIS — R10.32 LEFT LOWER QUADRANT ABDOMINAL PAIN: Primary | ICD-10-CM

## 2025-06-27 PROCEDURE — 99214 OFFICE O/P EST MOD 30 MIN: CPT | Mod: PBBFAC,PN,25 | Performed by: STUDENT IN AN ORGANIZED HEALTH CARE EDUCATION/TRAINING PROGRAM

## 2025-06-27 PROCEDURE — 99999 PR PBB SHADOW E&M-EST. PATIENT-LVL IV: CPT | Mod: PBBFAC,,, | Performed by: STUDENT IN AN ORGANIZED HEALTH CARE EDUCATION/TRAINING PROGRAM

## 2025-06-27 PROCEDURE — 76700 US EXAM ABDOM COMPLETE: CPT | Mod: 26,,, | Performed by: STUDENT IN AN ORGANIZED HEALTH CARE EDUCATION/TRAINING PROGRAM

## 2025-06-27 PROCEDURE — 76700 US EXAM ABDOM COMPLETE: CPT | Mod: TC

## 2025-06-27 RX ORDER — DICYCLOMINE HYDROCHLORIDE 10 MG/1
10 CAPSULE ORAL 3 TIMES DAILY PRN
Qty: 40 CAPSULE | Refills: 0 | Status: SHIPPED | OUTPATIENT
Start: 2025-06-27

## 2025-06-27 NOTE — PROGRESS NOTES
"Patient ID: Matthew Graham Blakemore is a 44 y.o. male.    Chief Complaint: Abdominal Pain (Left )    History of Present Illness    CHIEF COMPLAINT:  Mr. Blakemore presents today with left-sided abdominal pain    HISTORY OF PRESENT ILLNESS:  He reports left-sided abdominal pain in the inguinal region, rated 7/10 in severity. The pain began one week ago without any known precipitating cause and is now migrating to the back. He denies any noticeable bulge in the area. He describes the pain as feeling "unusual" and reports it has been consistent over the past week. He reports mild diarrhea but denies fever, urinary symptoms, or dysuria.    SURGICAL HISTORY:  He underwent inguinal hernia repair with mesh in 2014. He denies any current symptoms related to the prior hernia repair.      ROS:  General: -fever, -chills, -fatigue, -weight gain, -weight loss  Eyes: -vision changes, -redness, -discharge  ENT: -ear pain, -nasal congestion, -sore throat  Cardiovascular: -chest pain, -palpitations, -lower extremity edema  Respiratory: -cough, -shortness of breath  Gastrointestinal: +abdominal pain, -nausea, -vomiting, +diarrhea, -constipation, -blood in stool  Genitourinary: -dysuria, -hematuria, -frequency  Musculoskeletal: -joint pain, -muscle pain, +back pain  Skin: -rash, -lesion  Neurological: -headache, -dizziness, -numbness, -tingling  Psychiatric: -anxiety, -depression, -sleep difficulty         Pmh, Psh, Family Hx, Social Hx updated in Epic Tabs today.       6/27/2025     8:33 AM 3/18/2024     8:31 AM   Depression Patient Health Questionnaire   Over the last two weeks how often have you been bothered by little interest or pleasure in doing things Not at all Not at all   Over the last two weeks how often have you been bothered by feeling down, depressed or hopeless Not at all Not at all   PHQ-2 Total Score 0 0       Active Problem List with Overview Notes    Diagnosis Date Noted    Right lateral epicondylitis 03/15/2021    " Bilateral elbow joint pain 12/08/2020    Triceps tendinitis 12/08/2020    Left lateral epicondylitis 12/08/2020    Shoulder weakness 09/23/2020    Acute pain of left shoulder 08/26/2020    Stiffness of left shoulder joint 08/26/2020    Primary snoring      6/4/2020 PSG No obstructive sleep apnea AHI 1.2   Primary snoring       Restless legs syndrome (RLS) 03/12/2020     Mirapex 0.25 mg nightly        Tobacco abuse 03/12/2020    Lumbar radiculopathy 03/04/2020    Unwanted fertility 12/28/2018       Past Medical History:   Diagnosis Date    Tinnitus        Past Surgical History:   Procedure Laterality Date    HERNIA REPAIR      SELECTIVE INJECTION OF ANESTHETIC AGENT AROUND LUMBAR SPINAL NERVE ROOT BY TRANSFORAMINAL APPROACH Left 3/4/2020    Procedure: BLOCK, SPINAL NERVE ROOT, LUMBAR, SELECTIVE, TRANSFORAMINAL APPROACH Left L4-5, L5-S1 RN IV sedation;  Surgeon: Anjel Nugent MD;  Location: [facility] PAIN MGT;  Service: Pain Management;  Laterality: Left;    TRANSFORAMINAL EPIDURAL INJECTION OF STEROID Left 11/18/2020    Procedure: left L4/5 + L5/S1  TF ART;  Surgeon: Anjel Nugent MD;  Location: [facility] PAIN MGT;  Service: Pain Management;  Laterality: Left;    ULTRASOUND GUIDANCE Left 1/31/2019    Procedure: ULTRASOUND GUIDANCE;  Surgeon: Travis Flowers IV, MD;  Location: [facility] OR;  Service: Urology;  Laterality: Left;  Doppler ultrasound of left testicular artery    VASECTOMY Bilateral 1/31/2019    Procedure: VASECTOMY;  Surgeon: Travis Flowers IV, MD;  Location: [facility] OR;  Service: Urology;  Laterality: Bilateral;   Vasectomy, bilateral with excision and scrotal exploration on left side; difficult       Family History   Problem Relation Name Age of Onset    Heart attacks under age 50 Maternal Grandfather      Breast cancer Maternal Grandfather      Melanoma Mother      Breast cancer Mother      Melanoma Father      Melanoma Sister      Melanoma Brother         Social History     Socioeconomic History    Marital status:     Tobacco Use    Smoking status: Every Day     Current packs/day: 0.25     Average packs/day: 0.3 packs/day for 35.5 years (8.9 ttl pk-yrs)     Types: Vaping w/o nicotine, Cigarettes     Start date: 1990    Smokeless tobacco: Never   Substance and Sexual Activity    Alcohol use: No    Drug use: No    Sexual activity: Not Currently     Partners: Female   Social History Narrative    Live w/ spouse and  5 minor children      Social Drivers of Health     Financial Resource Strain: Low Risk  (1/14/2020)    Overall Financial Resource Strain (CARDIA)     Difficulty of Paying Living Expenses: Not very hard   Food Insecurity: No Food Insecurity (1/14/2020)    Hunger Vital Sign     Worried About Running Out of Food in the Last Year: Never true     Ran Out of Food in the Last Year: Never true   Transportation Needs: No Transportation Needs (1/14/2020)    PRAPARE - Transportation     Lack of Transportation (Medical): No     Lack of Transportation (Non-Medical): No   Physical Activity: Insufficiently Active (1/14/2020)    Exercise Vital Sign     Days of Exercise per Week: 4 days     Minutes of Exercise per Session: 30 min   Stress: No Stress Concern Present (1/14/2020)    [employer] [place] [employer] - Occupational Stress Questionnaire     Feeling of Stress : Not at all       Medications Ordered Prior to Encounter[1]    Review of patient's allergies indicates:   Allergen Reactions    Penicillins Other (See Comments)     unknown         Review of Systems      Physical Exam  Abdominal:      Palpations: Abdomen is soft.      Tenderness: There is abdominal tenderness. There is no right CVA tenderness, left CVA tenderness, guarding or rebound.   Neurological:      Mental Status: He is alert.      Assessment:     1. Left lower quadrant abdominal pain        LABS:   Lab Results   Component Value Date    HGBA1C 5.1 03/18/2024      Lab Results   Component Value Date    CHOL 186 03/18/2024    CHOL 154 06/12/2018      Lab Results   Component Value Date    LDLCALC 128.4 03/18/2024    LDLCALC 105.6 06/12/2018     Lab Results   Component Value Date    WBC 4.95 03/18/2024    HGB 15.8 03/18/2024    HCT 47.9 03/18/2024     03/18/2024    CHOL 186 03/18/2024    TRIG 118 03/18/2024    HDL 34 (L) 03/18/2024    ALT 34 03/18/2024    AST 24 03/18/2024     03/18/2024    K 4.1 03/18/2024     03/18/2024    CREATININE 0.9 03/18/2024    BUN 12 03/18/2024    CO2 24 03/18/2024    TSH 1.187 01/18/2022    PSA 0.55 06/22/2021    HGBA1C 5.1 03/18/2024       Plan:   Richard was seen today for abdominal pain.    Diagnoses and all orders for this visit:    Left lower quadrant abdominal pain  -     US Abdomen Complete; Future  -     dicyclomine (BENTYL) 10 MG capsule; Take 1 capsule (10 mg total) by mouth 3 (three) times daily as needed (abdominal pain).        Assessment & Plan    LOWER ABDOMINAL PAIN:  - Mr. Blakemore reports left-sided abdominal pain migrating to the back, rated 7/10 in severity, lasting for 1 week.  - Examination revealed tenderness in the left inguinal region, with pain persisting upon muscle tightening and leg lifting.  - While patient has history of inguinal hernia mesh repair in 2014, no obvious hernia was detected on exam.  - Pain characteristics during muscle tightening suggest muscular origin rather than internal inflammation, as internal inflammation typically improves with leg lifting.  - Ordered abdominal ultrasound to rule out mesh-related issues and confirm muscular origin of pain.  - Prescribed benthals for pain management.        Face to Face time with patient:  8:40 AM CDT -      Each patient to whom he or she provides medical services by telemedicine is:  (1) informed of the relationship between the physician and patient and the respective role of any other health care provider with respect to management of the patient; and (2) notified that he or she may decline to receive medical services by  telemedicine and may withdraw from such care at any time.    I spent a total of   32    minutes face to face and non-face to face on the date of this visit.This includes time preparing to see the patient (eg, review of tests, notes), obtaining and/or reviewing additional history from an independent historian and/or outside medical records, documenting clinical information in the electronic health record, independently interpreting results and/or communicating results to the patient/family/caregiver, or care coordinator.  Visit today included increased complexity associated with the care of the episodic problem addressed and managing the longitudinal care of the patient due to the serious and/or complex managed problem(s).    There are no Patient Instructions on file for this visit.    No follow-ups on file.  The 10-year ASCVD risk score (Jesus CALVO, et al., 2019) is: 8.3%    Values used to calculate the score:      Age: 44 years      Sex: Male      Is Non- : No      Diabetic: No      Tobacco smoker: Yes      Systolic Blood Pressure: 134 mmHg      Is BP treated: No      HDL Cholesterol: 34 mg/dL      Total Cholesterol: 186 mg/dL    This note was generated with the assistance of ambient listening technology. Verbal consent was obtained by the patient and accompanying visitor(s) for the recording of patient appointment to facilitate this note. I attest to having reviewed and edited the generated note for accuracy, though some syntax or spelling errors may persist. Please contact the author of this note for any clarification.           [1]   Current Outpatient Medications on File Prior to Visit   Medication Sig Dispense Refill    meloxicam (MOBIC) 15 MG tablet TAKE 1 TABLET DAILY 90 tablet 3    nystatin (MYCOSTATIN) powder Apply topically 2 (two) times daily. 60 g 6    sildenafiL (VIAGRA) 100 MG tablet Take 1 tablet (100 mg total) by mouth daily as needed for Erectile Dysfunction. 90 tablet 3     tadalafiL (CIALIS) 20 MG Tab Take 1 tablet (20 mg total) by mouth once daily. 30 tablet 11    zolpidem (AMBIEN) 5 MG Tab Take 1 tablet (5 mg total) by mouth nightly as needed. (Patient not taking: Reported on 3/18/2024) 26 tablet 2     No current facility-administered medications on file prior to visit.

## 2025-06-30 ENCOUNTER — RESULTS FOLLOW-UP (OUTPATIENT)
Dept: FAMILY MEDICINE | Facility: CLINIC | Age: 45
End: 2025-06-30

## 2025-06-30 ENCOUNTER — HOSPITAL ENCOUNTER (EMERGENCY)
Facility: HOSPITAL | Age: 45
Discharge: HOME | End: 2025-07-01
Attending: EMERGENCY MEDICINE
Payer: OTHER GOVERNMENT

## 2025-06-30 DIAGNOSIS — N50.812 PAIN IN LEFT TESTICLE: ICD-10-CM

## 2025-06-30 PROCEDURE — 87491 CHLMYD TRACH DNA AMP PROBE: CPT | Performed by: NURSE PRACTITIONER

## 2025-06-30 PROCEDURE — 99284 EMERGENCY DEPT VISIT MOD MDM: CPT

## 2025-06-30 PROCEDURE — 81003 URINALYSIS AUTO W/O SCOPE: CPT | Performed by: NURSE PRACTITIONER

## 2025-07-01 ENCOUNTER — OFFICE VISIT (OUTPATIENT)
Dept: UROLOGY | Facility: CLINIC | Age: 45
End: 2025-07-01
Payer: OTHER GOVERNMENT

## 2025-07-01 VITALS
HEIGHT: 72 IN | BODY MASS INDEX: 30.16 KG/M2 | HEART RATE: 67 BPM | WEIGHT: 222.69 LBS | BODY MASS INDEX: 30.16 KG/M2 | WEIGHT: 222.69 LBS | SYSTOLIC BLOOD PRESSURE: 126 MMHG | SYSTOLIC BLOOD PRESSURE: 107 MMHG | RESPIRATION RATE: 18 BRPM | HEART RATE: 73 BPM | TEMPERATURE: 98 F | RESPIRATION RATE: 16 BRPM | DIASTOLIC BLOOD PRESSURE: 83 MMHG | DIASTOLIC BLOOD PRESSURE: 62 MMHG | HEIGHT: 72 IN | OXYGEN SATURATION: 95 % | TEMPERATURE: 98 F

## 2025-07-01 DIAGNOSIS — N50.89 TESTICULAR MASS: Primary | ICD-10-CM

## 2025-07-01 PROBLEM — Z30.09 UNWANTED FERTILITY: Status: RESOLVED | Noted: 2018-12-28 | Resolved: 2025-07-01

## 2025-07-01 PROBLEM — R29.898 SHOULDER WEAKNESS: Status: RESOLVED | Noted: 2020-09-23 | Resolved: 2025-07-01

## 2025-07-01 LAB
BILIRUB UR QL STRIP.AUTO: NEGATIVE
BILIRUBIN, UA POC OHS: NEGATIVE
BLOOD, UA POC OHS: NEGATIVE
CLARITY UR: CLEAR
CLARITY, UA POC OHS: CLEAR
COLOR UR AUTO: YELLOW
COLOR, UA POC OHS: YELLOW
GLUCOSE UR QL STRIP: NEGATIVE
GLUCOSE, UA POC OHS: NEGATIVE
HGB UR QL STRIP: NEGATIVE
KETONES UR QL STRIP: NEGATIVE
KETONES, UA POC OHS: NEGATIVE
LEUKOCYTE ESTERASE UR QL STRIP: NEGATIVE
LEUKOCYTES, UA POC OHS: NEGATIVE
NITRITE UR QL STRIP: NEGATIVE
NITRITE, UA POC OHS: NEGATIVE
PH UR STRIP: 6 [PH]
PH, UA POC OHS: 6
POC RESIDUAL URINE VOLUME: 21 ML (ref 0–100)
PROT UR QL STRIP: NEGATIVE
PROTEIN, UA POC OHS: NEGATIVE
SP GR UR STRIP: 1.02
SPECIFIC GRAVITY, UA POC OHS: 1.02
UROBILINOGEN UR STRIP-ACNC: NEGATIVE EU/DL
UROBILINOGEN, UA POC OHS: 1

## 2025-07-01 PROCEDURE — 99999PBSHW POCT BLADDER SCAN: Mod: PBBFAC,,,

## 2025-07-01 PROCEDURE — 51798 US URINE CAPACITY MEASURE: CPT | Mod: PBBFAC | Performed by: NURSE PRACTITIONER

## 2025-07-01 PROCEDURE — 99999PBSHW POCT URINALYSIS(INSTRUMENT): Mod: PBBFAC,,,

## 2025-07-01 PROCEDURE — 99214 OFFICE O/P EST MOD 30 MIN: CPT | Mod: PBBFAC,25 | Performed by: NURSE PRACTITIONER

## 2025-07-01 PROCEDURE — 81003 URINALYSIS AUTO W/O SCOPE: CPT | Mod: PBBFAC | Performed by: NURSE PRACTITIONER

## 2025-07-01 PROCEDURE — 99999 PR PBB SHADOW E&M-EST. PATIENT-LVL IV: CPT | Mod: PBBFAC,,, | Performed by: NURSE PRACTITIONER

## 2025-07-01 PROCEDURE — 99214 OFFICE O/P EST MOD 30 MIN: CPT | Mod: S$PBB,,, | Performed by: NURSE PRACTITIONER

## 2025-07-01 RX ORDER — HYDROCODONE BITARTRATE AND ACETAMINOPHEN 10; 325 MG/1; MG/1
1 TABLET ORAL EVERY 6 HOURS PRN
Qty: 12 TABLET | Refills: 0 | Status: SHIPPED | OUTPATIENT
Start: 2025-07-01

## 2025-07-01 RX ORDER — DOXYCYCLINE HYCLATE 100 MG
100 TABLET ORAL 2 TIMES DAILY
Qty: 28 TABLET | Refills: 0 | Status: SHIPPED | OUTPATIENT
Start: 2025-07-01 | End: 2025-07-03

## 2025-07-01 NOTE — PROGRESS NOTES
Chief Complaint:   Epididymitis    HPI:   Patient is a 44-year-old male that is presenting with an acute onset of last testicular pain and scrotal swelling.  Denies injury to area.  Presented to emergency department, scrotal ultrasound was negative for torsion, however indicated the possibility of prior vasectomy causing a ductus deferens obstruction.  Patient denies gross hematuria.  Patient is status post vasectomy, 2019 per Dr. Flowers.    Jose Cruz HERNANDEZ  3/19/25-ED follow up today. Using viagra, which was working well and then  and maybe didn't work as well. Sometimes he wishes he didn't have to plan for intercourse. Libido is good. Denies weak stream, stranguria or gross hematuria.   3/22/23-43yo male, here for f/u of ED. He has a history of difficulty maintaining erections. He has been using Viagra with good results. Libido is good. Pt with chronic back pain, and feels like his back may be the cause of his symptoms. Sometimes has hesitancy, but not bothersome. No gross hematuria.     Allergies:  Penicillins    Medications:  has a current medication list which includes the following prescription(s): dicyclomine, doxycycline, hydrocodone-acetaminophen, meloxicam, nystatin, sildenafil, tadalafil, and zolpidem.    Review of Systems:  General: No fever, chills, fatigability, or weight loss.  Skin: No rashes, itching, or changes in color or texture of skin.  Chest: Denies SAVAGE, cyanosis, wheezing, cough, and sputum production.  Abdomen: Appetite fine. No weight loss. Denies diarrhea, abdominal pain, hematemesis, or blood in stool.  Musculoskeletal: No joint stiffness or swelling. Denies back pain.  : As above.  All other review of systems negative.    PMH:   has a past medical history of Tinnitus.    PSH:   has a past surgical history that includes Hernia repair; Vasectomy (Bilateral, 2019); Ultrasound guidance (Left, 2019); Selective injection of anesthetic agent around lumbar spinal nerve root by  transforaminal approach (Left, 3/4/2020); and Transforaminal epidural injection of steroid (Left, 11/18/2020).    FamHx: family history includes Breast cancer in his maternal grandfather and mother; Heart attacks under age 50 in his maternal grandfather; Melanoma in his brother, father, mother, and sister.    SocHx:  reports that he has been smoking vaping w/o nicotine and cigarettes. He started smoking about 35 years ago. He has a 8.9 pack-year smoking history. He has never used smokeless tobacco. He reports that he does not drink alcohol and does not use drugs.      Physical Exam:  Vitals:    07/01/25 1331   BP: 126/83   Pulse: 67   Resp: 16   Temp: 98 °F (36.7 °C)     General: A&Ox3, no apparent distress, no deformities  Neck: No masses, normal thyroid  Lungs: normal inspiration, no use of accessory muscles  Heart: normal pulse, no arrhythmias  Abdomen: Soft, NT, ND, no masses, no hernias, no hepatosplenomegaly  Lymphatic: Neck and groin nodes negative  Skin: The skin is warm and dry. No jaundice.  Ext: No c/c/e.  :  Normal right testicular and scrotal exam, slight left scrotal swelling with testicular tenderness left epididymitis enlargement.     Labs/Studies:   07/01/2025  EXAMINATION:  US SCROTUM AND TESTICLES     CLINICAL HISTORY:  Left testicular pain     TECHNIQUE:  Sonography of the scrotum and testes.     COMPARISON:  None.     FINDINGS:  Right Testicle:     *Size: 4.2 cm  *Appearance: Normal.  *Flow: Normal arterial and venous flow  *Epididymis: Normal.  *Hydrocele: None.  *Varicocele: None.  .     Left Testicle:     *Size: 4.3 cm  *Appearance: Normal.  *Flow: Normal arterial and venous flow  *Epididymis: Tubular ectasia in the body and tail of the left epididymis, nonspecific, raising question of prior vasectomy or other causes of ductus deferens obstruction.  *Hydrocele: None.  *Varicocele: None.  .     Other findings: None.     Impression:     No acute abnormality.     Tubular ectasia in the body  and tail of the left epididymis, nonspecific, raising question of prior vasectomy or other causes of ductus deferens obstruction.    Impression/Plan:   Possible epididymitis  Doxycycline prescribed and sent to his pharmacy.  Based on physical exam and scrotal ultrasound results, patient to follow up with MD.

## 2025-07-01 NOTE — ED PROVIDER NOTES
Encounter Date: 6/30/2025       History     Chief Complaint   Patient presents with    Groin Swelling     Pt reports left testicular swelling and pain that started a few days ago but significantly increased swelling and pain. Pt denies dysuria     44-year-old male presents the emergency department for left testicle pain for the past 2 days.  Patient denies any injury.  Patient denies any fever, chills, chest pain, shortness of breath, back pain, abdominal pain, nausea, vomiting, dysuria, discharge, and all other concerns at this time.    The history is provided by the patient. No  was used.     Review of patient's allergies indicates:   Allergen Reactions    Penicillins Other (See Comments)     unknown     Past Medical History:   Diagnosis Date    Tinnitus      Past Surgical History:   Procedure Laterality Date    HERNIA REPAIR      SELECTIVE INJECTION OF ANESTHETIC AGENT AROUND LUMBAR SPINAL NERVE ROOT BY TRANSFORAMINAL APPROACH Left 3/4/2020    Procedure: BLOCK, SPINAL NERVE ROOT, LUMBAR, SELECTIVE, TRANSFORAMINAL APPROACH Left L4-5, L5-S1 RN IV sedation;  Surgeon: Anjel Nugent MD;  Location: [facility] PAIN MGT;  Service: Pain Management;  Laterality: Left;    TRANSFORAMINAL EPIDURAL INJECTION OF STEROID Left 11/18/2020    Procedure: left L4/5 + L5/S1  TF ART;  Surgeon: Anjel Nugent MD;  Location: [facility] PAIN MGT;  Service: Pain Management;  Laterality: Left;    ULTRASOUND GUIDANCE Left 1/31/2019    Procedure: ULTRASOUND GUIDANCE;  Surgeon: Travis Flowers IV, MD;  Location: [facility] [place] OR;  Service: Urology;  Laterality: Left;  Doppler ultrasound of left testicular artery    VASECTOMY Bilateral 1/31/2019    Procedure: VASECTOMY;  Surgeon: Travis Flowers IV, MD;  Location: [facility] [place] OR;  Service: Urology;  Laterality: Bilateral;   Vasectomy, bilateral with excision and scrotal exploration on left side; difficult     Family History   Problem Relation Name Age of Onset    Heart attacks under age 50 Maternal  Grandfather      Breast cancer Maternal Grandfather      Melanoma Mother      Breast cancer Mother      Melanoma Father      Melanoma Sister      Melanoma Brother       Social History[1]  Review of Systems   Constitutional:  Negative for fever.   HENT:  Negative for sore throat.    Respiratory:  Negative for shortness of breath.    Cardiovascular:  Negative for chest pain.   Gastrointestinal:  Negative for abdominal pain, nausea and vomiting.   Genitourinary:  Positive for testicular pain. Negative for dysuria.   Musculoskeletal:  Negative for back pain.   Skin:  Negative for rash.   Neurological:  Negative for weakness.   Hematological:  Does not bruise/bleed easily.       Physical Exam     Initial Vitals [06/30/25 2321]   BP Pulse Resp Temp SpO2   134/76 88 18 98.1 °F (36.7 °C) 99 %      MAP       --         Physical Exam    Nursing note and vitals reviewed.  Constitutional: He appears well-developed and well-nourished. He is not diaphoretic. No distress.   HENT:   Head: Normocephalic and atraumatic.   Eyes: Right eye exhibits no discharge. Left eye exhibits no discharge.   Neck: Neck supple.   Normal range of motion.  Cardiovascular:  Normal rate.           Pulmonary/Chest: No respiratory distress.   Abdominal: He exhibits no distension.   Genitourinary:    Genitourinary Comments: Left testicle with tenderness to palpation.     Musculoskeletal:         General: Normal range of motion.      Cervical back: Normal range of motion and neck supple.     Neurological: He is alert and oriented to person, place, and time. He has normal strength.   Skin: Skin is warm and dry.   Psychiatric: He has a normal mood and affect. His behavior is normal. Thought content normal.         ED Course   Procedures  Labs Reviewed   URINALYSIS, REFLEX TO URINE CULTURE - Normal       Result Value    Color, UA Yellow      Appearance, UA Clear      pH, UA 6.0      Spec Grav UA 1.025      Protein, UA Negative      Glucose, UA Negative       Ketones, UA Negative      Bilirubin, UA Negative      Blood, UA Negative      Nitrites, UA Negative      Urobilinogen, UA Negative      Leukocyte Esterase, UA Negative     C. TRACHOMATIS/N. GONORRHOEAE BY AMP DNA   HEPATITIS C ANTIBODY   HEP C VIRUS HOLD SPECIMEN   HIV 1 / 2 ANTIBODY   [place] URINE HOLD          Imaging Results              US Scrotum And Testicles (In process)                      Medications - No data to display  Medical Decision Making  Differential diagnosis  STI, UTI, epididymitis, torsion, abscess    Amount and/or Complexity of Data Reviewed  Labs: ordered.  Radiology: ordered.  Discussion of management or test interpretation with external provider(s): I discussed with patient that the evaluation in the emergency department does not suggest any emergent or life threatening medical condition requiring immediate intervention beyond what was provided in the ED, and I believe patient is safe for discharge.  Regardless, an unremarkable evaluation in the ED does not preclude the development or presence of a serious of life threatening condition. As such, patient was instructed to return immediately for any worsening or change in current symptoms. I also discussed the results of my evaluation and diagnostics with patient and he concurs with the evaluation and management plan.  Detailed written and verbal instructions provided to patient and he expressed a verbal understanding of the discharge instructions and overall management plan. Reiterated the importance of medication administration and safety and advised patient to follow up with primary care provider in 3-5 days or sooner if needed.  Also advised patient to return to the ER for any complications.       Risk  Prescription drug management.                                      Clinical Impression:  Final diagnoses:  [N50.812] Pain in left testicle          ED Disposition Condition    Discharge Stable          ED Prescriptions       Medication  Sig Dispense Start Date End Date Auth. Provider    HYDROcodone-acetaminophen (NORCO)  mg per tablet Take 1 tablet by mouth every 6 (six) hours as needed for Pain. 12 tablet 7/1/2025 -- Chet Billingsley NP          Follow-up Information       Follow up With Specialties Details Why Contact Info Additional Information    [facility] Urology  As needed [address]   [facility] [address] [phone] Please take [address] for the Medical Office Building. Check in on the 2nd floor.    [facility] - Emergency Dept. Emergency Medicine  If symptoms worsen [address]  [facility] [address] [phone]                    [1]   Social History  Tobacco Use    Smoking status: Every Day     Current packs/day: 0.25     Average packs/day: 0.3 packs/day for 35.5 years (8.9 ttl pk-yrs)     Types: Vaping w/o nicotine, Cigarettes     Start date: 1990    Smokeless tobacco: Never   Substance Use Topics    Alcohol use: No    Drug use: No        Chet Billingsley NP  07/01/25 0321

## 2025-07-02 LAB — HOLD SPECIMEN: NORMAL

## 2025-07-03 ENCOUNTER — PATIENT MESSAGE (OUTPATIENT)
Dept: UROLOGY | Facility: CLINIC | Age: 45
End: 2025-07-03
Payer: OTHER GOVERNMENT

## 2025-07-03 LAB
C TRACH DNA SPEC QL NAA+PROBE: NOT DETECTED
CTGC SOURCE (OHS) ORD-325: NORMAL
N GONORRHOEA DNA UR QL NAA+PROBE: NOT DETECTED

## 2025-07-03 RX ORDER — SULFAMETHOXAZOLE AND TRIMETHOPRIM 800; 160 MG/1; MG/1
1 TABLET ORAL 2 TIMES DAILY
Qty: 20 TABLET | Refills: 0 | Status: SHIPPED | OUTPATIENT
Start: 2025-07-03 | End: 2025-07-13

## 2025-07-15 ENCOUNTER — OFFICE VISIT (OUTPATIENT)
Dept: UROLOGY | Facility: CLINIC | Age: 45
End: 2025-07-15
Payer: OTHER GOVERNMENT

## 2025-07-15 VITALS
HEART RATE: 72 BPM | BODY MASS INDEX: 29.76 KG/M2 | SYSTOLIC BLOOD PRESSURE: 128 MMHG | RESPIRATION RATE: 18 BRPM | DIASTOLIC BLOOD PRESSURE: 87 MMHG | HEIGHT: 72 IN | WEIGHT: 219.69 LBS

## 2025-07-15 DIAGNOSIS — N45.1 EPIDIDYMITIS: Primary | ICD-10-CM

## 2025-07-15 DIAGNOSIS — N50.812 TESTICULAR PAIN, LEFT: ICD-10-CM

## 2025-07-15 PROCEDURE — 99999 PR PBB SHADOW E&M-EST. PATIENT-LVL III: CPT | Mod: PBBFAC,,, | Performed by: UROLOGY

## 2025-07-15 PROCEDURE — 99213 OFFICE O/P EST LOW 20 MIN: CPT | Mod: PBBFAC | Performed by: UROLOGY

## 2025-07-15 PROCEDURE — 99214 OFFICE O/P EST MOD 30 MIN: CPT | Mod: S$PBB,,, | Performed by: UROLOGY

## 2025-07-15 RX ORDER — SULFAMETHOXAZOLE AND TRIMETHOPRIM 800; 160 MG/1; MG/1
1 TABLET ORAL 2 TIMES DAILY
Qty: 8 TABLET | Refills: 0 | Status: SHIPPED | OUTPATIENT
Start: 2025-07-15 | End: 2025-07-19

## 2025-07-15 NOTE — PROGRESS NOTES
Chief Complaint:   Encounter Diagnoses   Name Primary?    Epididymitis Yes    Testicular pain, left        HPI:  HPI Matthew Graham Blakemore uma 44 y.o. male who presents with follow up from epididymitis.  Patient was seen in the emergency room with the acute swelling of his left testicle.  Found to have tubular ectasia of the epididymis.  He does have a history of vasectomy done in 2019.  He was started on antibiotic therapy initially with doxy and switched to Bactrim.  This has improved his swelling and pain.  He still has pain with standing for long periods of time.  He does state the swelling has improved dramatically.    History:  Social History[1]  Past Medical History:   Diagnosis Date    Tinnitus      Past Surgical History:   Procedure Laterality Date    HERNIA REPAIR      SELECTIVE INJECTION OF ANESTHETIC AGENT AROUND LUMBAR SPINAL NERVE ROOT BY TRANSFORAMINAL APPROACH Left 3/4/2020    Procedure: BLOCK, SPINAL NERVE ROOT, LUMBAR, SELECTIVE, TRANSFORAMINAL APPROACH Left L4-5, L5-S1 RN IV sedation;  Surgeon: Anjel Nugent MD;  Location: [facility];  Service: Pain Management;  Laterality: Left;    TRANSFORAMINAL EPIDURAL INJECTION OF STEROID Left 11/18/2020    Procedure: left L4/5 + L5/S1  TF ART;  Surgeon: Anjel Nugent MD;  Location: [facility];  Service: Pain Management;  Laterality: Left;    ULTRASOUND GUIDANCE Left 1/31/2019    Procedure: ULTRASOUND GUIDANCE;  Surgeon: Travis Flowers IV, MD;  Location: [facility] OR;  Service: Urology;  Laterality: Left;  Doppler ultrasound of left testicular artery    VASECTOMY Bilateral 1/31/2019    Procedure: VASECTOMY;  Surgeon: Travis Flowers IV, MD;  Location: [facility] OR;  Service: Urology;  Laterality: Bilateral;   Vasectomy, bilateral with excision and scrotal exploration on left side; difficult     Family History   Problem Relation Name Age of Onset    Heart attacks under age 50 Maternal Grandfather      Breast cancer Maternal Grandfather      Melanoma Mother       Breast cancer Mother      Melanoma Father      Melanoma Sister      Melanoma Brother         Medications Ordered Prior to Encounter[2]     Objective:     Vitals:    07/15/25 1055   BP: 128/87   Pulse: 72   Resp: 18   Weight: 99.6 kg (219 lb 11 oz)   Height: 6' (1.829 m)      BMI Readings from Last 1 Encounters:   07/15/25 29.80 kg/m²          Physical Exam  Indurated left epididymis, tender to palpation  Lab Results   Component Value Date    PSADIAG 0.42 01/14/2020    PSA 0.55 06/22/2021        Lab Results   Component Value Date    CREATININE 0.9 03/18/2024      Assessment:       1. Epididymitis    2. Testicular pain, left        Plan:     1. Epididymitis    2. Testicular pain, left       Orders Placed This Encounter    sulfamethoxazole-trimethoprim 800-160mg (BACTRIM DS) 800-160 mg Tab      Suspect reactive epididymitis.  This may have been due to straining.  Reassured tubular ectasia of the epididymis is a benign process associated with the prior history of vasectomy.  He has responded well to antibiotic therapy.  We will extend for a few more days.  Patient will let us know if this does not resolve completely in the next 6-8 weeks.       [1]   Social History  Tobacco Use    Smoking status: Every Day     Current packs/day: 0.25     Average packs/day: 0.3 packs/day for 35.5 years (8.9 ttl pk-yrs)     Types: Vaping w/o nicotine, Cigarettes     Start date: 1990    Smokeless tobacco: Never   Substance Use Topics    Alcohol use: No    Drug use: No   [2]   Current Outpatient Medications on File Prior to Visit   Medication Sig Dispense Refill    dicyclomine (BENTYL) 10 MG capsule Take 1 capsule (10 mg total) by mouth 3 (three) times daily as needed (abdominal pain). 40 capsule 0    HYDROcodone-acetaminophen (NORCO)  mg per tablet Take 1 tablet by mouth every 6 (six) hours as needed for Pain. 12 tablet 0    meloxicam (MOBIC) 15 MG tablet TAKE 1 TABLET DAILY 90 tablet 3    nystatin (MYCOSTATIN) powder Apply  topically 2 (two) times daily. 60 g 6    sildenafiL (VIAGRA) 100 MG tablet Take 1 tablet (100 mg total) by mouth daily as needed for Erectile Dysfunction. 90 tablet 3    tadalafiL (CIALIS) 20 MG Tab Take 1 tablet (20 mg total) by mouth once daily. 30 tablet 11    zolpidem (AMBIEN) 5 MG Tab Take 1 tablet (5 mg total) by mouth nightly as needed. (Patient not taking: Reported on 3/18/2024) 26 tablet 2     No current facility-administered medications on file prior to visit.

## (undated) DEVICE — SEE MEDLINE ITEM 157027

## (undated) DEVICE — GOWN SMARTGOWN LVL4 X-LONG XL

## (undated) DEVICE — SEE MEDLINE ITEM 152622

## (undated) DEVICE — SUT 4-0 CHROMIC GUT / SH

## (undated) DEVICE — SUT MONOCRYL 4.0 PS2 CP496G

## (undated) DEVICE — SUT PROLENE 5-0 RB1 8756H

## (undated) DEVICE — SEE MEDLINE ITEM 157148

## (undated) DEVICE — SYR 10CC LUER LOCK

## (undated) DEVICE — COVER OVERHEAD SURG LT BLUE

## (undated) DEVICE — GLOVE PROTEXIS HYDROGEL SZ8

## (undated) DEVICE — GAUZE SPONGE 4X4 12PLY

## (undated) DEVICE — MANIFOLD 4 PORT

## (undated) DEVICE — ELECTRODE REM PLYHSV RETURN 9

## (undated) DEVICE — CONTAINER SPECIMEN STRL 4OZ

## (undated) DEVICE — APPLICATOR CHLORAPREP ORN 26ML